# Patient Record
Sex: FEMALE | Race: WHITE | NOT HISPANIC OR LATINO | Employment: OTHER | URBAN - METROPOLITAN AREA
[De-identification: names, ages, dates, MRNs, and addresses within clinical notes are randomized per-mention and may not be internally consistent; named-entity substitution may affect disease eponyms.]

---

## 2017-01-27 ENCOUNTER — GENERIC CONVERSION - ENCOUNTER (OUTPATIENT)
Dept: OTHER | Facility: OTHER | Age: 72
End: 2017-01-27

## 2017-01-30 ENCOUNTER — GENERIC CONVERSION - ENCOUNTER (OUTPATIENT)
Dept: OTHER | Facility: OTHER | Age: 72
End: 2017-01-30

## 2017-03-22 ENCOUNTER — GENERIC CONVERSION - ENCOUNTER (OUTPATIENT)
Dept: OTHER | Facility: OTHER | Age: 72
End: 2017-03-22

## 2017-04-05 ENCOUNTER — GENERIC CONVERSION - ENCOUNTER (OUTPATIENT)
Dept: OTHER | Facility: OTHER | Age: 72
End: 2017-04-05

## 2017-04-11 DIAGNOSIS — Z12.31 ENCOUNTER FOR SCREENING MAMMOGRAM FOR MALIGNANT NEOPLASM OF BREAST: ICD-10-CM

## 2017-05-15 ENCOUNTER — GENERIC CONVERSION - ENCOUNTER (OUTPATIENT)
Dept: OTHER | Facility: OTHER | Age: 72
End: 2017-05-15

## 2018-01-10 NOTE — MISCELLANEOUS
Message  Rec'd call from patient questioning the results of her pathology from in-office procedure completed on 8/1/2016  She states that she was told she would receive a call when pathology was in, and she was getting concerned  I apologized for the delay  (Dr Ephraim Angel verified pathology on 8/11/2016 ) Patient was given normal pathology results and again, I apologized for the delay     Active Problems    1  Anxiety disorder (300 00) (F41 9)   2  Colon cancer screening (V76 51) (Z12 11)   3  Encounter for screening mammogram for malignant neoplasm of breast (V76 12)   (Z12 31)   4  Encounter for special screening examination for genitourinary disorder (V81 6) (Z13 89)   5  Hyperlipidemia (272 4) (E78 5)   6  Left knee pain (719 46) (M25 562)   7  Macular degeneration (362 50) (H35 30)   8  Medicare annual wellness visit, subsequent (V70 0) (Z00 00)   9  Osteoarthritis of left knee, unspecified osteoarthritis type (715 96) (M17 9)   10  Pigmented skin lesion (709 00) (L81 9)   11  Vaginitis, atrophic (627 3) (N95 2)    Current Meds   1  ALPRAZolam 1 MG Oral Tablet; 1/2 po daily prn; Therapy: 08IZV6626 to (Last Rx:32Avb4339) Ordered   2  Premarin 0 625 MG/GM Vaginal Cream; INSERT 0 5 GRAM INTRAVAGINALLY 2 times a   weel; Therapy: 21PYZ4863 to (Evaluate:11Nov2015)  Requested for: 39Ybz2454; Last   Rx:15Apr2015 Ordered   3  ProAir  (90 Base) MCG/ACT Inhalation Aerosol Solution; INHALE 1 TO 2 PUFFS   EVERY 4 TO 6 HOURS AS NEEDED; Therapy: 66PLE9955 to (Evaluate:19Jun2014)  Requested for: 68FQU9426; Last   Rx:59Gje1558 Ordered   4  Sulfamethoxazole-Trimethoprim 400-80 MG Oral Tablet (Bactrim); TAKE 1 TABLET   TWICE DAILY; Therapy: 14Izm0437 to (Evaluate:38Vtu1365)  Requested for: 90Hyy8387; Last   Rx:92Nkh2881 Ordered    Allergies    1   No Known Drug Allergies    Signatures   Electronically signed by : Jeb Looney, ; Aug 23 2016  9:25AM EST                       (Author)

## 2018-01-12 NOTE — PROGRESS NOTES
Assessment    1  Medicare annual wellness visit, subsequent (V70 0) (Z00 00)   2  Colon cancer screening (V76 51) (Z12 11)   3  Pigmented skin lesion (709 00) (L81 9)    Plan  Colon cancer screening    · (1) OCCULT BLOOD, FECAL IMMUNOCHEMICAL TEST; Status:Active; Requested  for:28Mar2016;    · COLONOSCOPY; Status:Active; Requested for:28Mar2016;   Encounter for screening mammogram for malignant neoplasm of breast    · * MAMMO SCREENING BILATERAL W CAD; Status:Hold For - Scheduling; Requested  for:28Mar2016;   Pigmented skin lesion    · 2 - Toshia GRAHAM, Nila Bruno  (Dermatology) Physician Referral  Consult  Status: Hold For -  Scheduling  Requested for: 50AQN7814  Care Summary provided  : Yes    Discussion/Summary  Impression: Subsequent Annual Wellness Visit  Cardiovascular screening and counseling: due for a lipid panel  Diabetes screening and counseling: due for blood glucose  Colorectal cancer screening and counseling: the patient declines screening and due for fecal occult blood testing  Breast cancer screening and counseling: due for a screening mammogram    Cervical cancer screening and counseling: Deferred to GYN  Osteoporosis screening and counseling: counseling was given on obtaining adequate amounts of calcium and vitamin D on a daily basis, counseling was given on the importance of regular weightbearing exercise and due for DXA axial skeleton  Abdominal aortic aneurysm screening and counseling: screening not indicated  Glaucoma screening and counseling: screening is current  HIV screening and counseling: screening not indicated  Immunizations: the patient declines the influenza vaccination, the patient declines the pneumococcal vaccination, the patient declines the hepatitis vaccination series, the patient declines the Zostavax vaccine and the patient declines the Tdap vaccine  Advance Directive Planning: complete and up to date  Patient Discussion: follow-up visit needed in one year  Chief Complaint  ANNUAL PE      History of Present Illness  Welcome to [employer] and Wellness Visits: The patient is being seen for the subsequent annual wellness visit  Medicare Screening and Risk Factors   Hospitalizations: no previous hospitalizations  Medicare Screening Tests Risk Questions   Drug and Alcohol Use: The patient has never smoked cigarettes  The patient reports never drinking alcohol  She has never used illicit drugs  Diet and Physical Activity: Current diet includes well balanced meals, 5 servings of fruit per day, 5 servings of vegetables per day, 2 servings of meat per day, 1 servings of whole grains per day, 1 servings of dairy products per day and 2 cups of coffee per day  Exercise: walking 120 minutes per week  Mood Disorder and Cognitive Impairment Screening: PHQ-9 Depression Scale   Over the past 2 weeks, how often have you been bothered by the following problems? 1 ) Little interest or pleasure in doing things? Not at all    2 ) Feeling down, depressed or hopeless? Not at all    3 ) Trouble falling asleep or sleeping too much? Not at all    4 ) Feeling tired or having little energy? Several days  5 ) Poor appetite or overeating? Not at all    6 ) Feeling bad about yourself, or that you are a failure, or have let yourself or your family down? Not at all    7 ) Trouble concentrating on things, such as reading a newspaper or watching television? Not at all    8 ) Moving or speaking so slowly that other people could have noticed, or the opposite, moving or speaking faster than usual? Not at all  How difficult have these problems made it for you to do your work, take care of things at home, or get along with people? Not at all  She denies feeling down, depressed, or hopeless over the past two weeks  She denies feeling little interest or pleasure in doing things over the past two weeks     Cognitive impairment screening: denies difficulty learning/retaining new information, denies difficulty handling complex tasks, denies difficulty with reasoning, denies difficulty with spatial ability and orientation, denies difficulty with language and denies difficulty with behavior  Functional Ability/Level of Safety: She denies hearing difficulties  Activities of daily living details: does not need help using the phone, no transportation help needed, does not need help shopping, no meal preparation help needed, does not need help doing housework, does not need help doing laundry, does not need help managing medications and does not need help managing money  Fall risk factors: The patient fell 0 times in the past 12 months  Home safety risk factors:  no grab bars in the bathroom and no handrails on the stairs, but no unfamiliar surroundings, no loose rugs, no poor household lighting, no uneven floors and no household clutter  Advance Directives: Advance directives: durable power of  for health care directives, but no living will and no advance directives  Co-Managers and Medical Equipment/Suppliers: See Patient Care Team      Patient Care Team    Care Team Member Role Specialty Office Number   Serenity GEE  Obstetrics/Gynecology [phone]     Review of Systems    Constitutional: no fever, no chills, no malaise, no fatigue and no anorexia  Head and Face: no facial pressure  Eyes: blurred vision and Macular degeneration right worse than the left  ENT: no earache, no nasal congestion, no nasal discharge and no hoarseness  Cardiovascular: no chest pain, no palpitations and no lower extremity edema  Respiratory: no shortness of breath, no cough and no colored sputum  Gastrointestinal: no abdominal pain, no abdominal bloating, no nausea, no vomiting, no diarrhea, no constipation, no bright red blood per rectum and no melena  Genitourinary: no dysuria, no urinary frequency, no urinary urgency, no hematuria and no foul-smelling vaginal discharge     Musculoskeletal: Left knee no pain, but no diffuse joint pain, no generalized muscle aches, no joint swelling and no joint stiffness  Integumentary and Breasts: Age-related spots /nevi, but no rashes, no breast pain and no breast lump    no skin lesions  Neurological: no paresthesias, no leg weakness and no difficulty walking  Psychiatric: no anxiety and no depression  Endocrine: no night sweats and no muscle weakness  Hematologic and Lymphatic: no tendency for easy bleeding and no tendency for easy bruising  Active Problems    1  Anxiety disorder (300 00) (F41 9)   2  Encounter for screening mammogram for malignant neoplasm of breast (V76 12)   (Z12 31)   3  Hyperlipidemia (272 4) (E78 5)   4  Impaired fasting glucose (790 21) (R73 01)   5  Left knee pain (719 46) (M25 562)   6  Macular degeneration (362 50) (H35 30)   7  Osteoarthritis of left knee, unspecified osteoarthritis type (715 96) (M17 9)   8  Vaginitis, atrophic (627 3) (N95 2)    Past Medical History    · History of Asthma (493 90) (J45 909)   · History of Depression (311) (F32 9)   · History of Exposure to chickenpox (V01 71) (Z20 820)   · History of bronchitis (V12 69) (Z87 09)   · History of glaucoma (V12 49) (Z86 69)   · History of hyperlipidemia (V12 29) (Z86 39)   · History of Mumps (072 9)   · History of Osteoporosis (733 00) (M81 0)   · History of Simple goiter (240 0) (E04 0)    The active problems and past medical history were reviewed and updated today  Surgical History    · History of Breast Surgery   · History of  Section   · History of Hemorrhoidectomy    The surgical history was reviewed and updated today  Family History    · Family history of Lung cancer    · Adopted (V68 89) (Z02 82)    The family history was reviewed and updated today  Social History    · Daily Coffee Consumption (2  Cups/Day)   · Never A Smoker   · Remote social alcohol use  The social history was reviewed and updated today  Current Meds   1  ALPRAZolam 1 MG Oral Tablet; 1/2 po daily prn; Therapy: 93DPC9709 to (Last Rx:03Mar2015) Ordered   2  Premarin 0 625 MG/GM Vaginal Cream; INSERT 0 5 GRAM INTRAVAGINALLY 2 times a   weel; Therapy: 22REP6503 to (Evaluate:11Nov2015)  Requested for: 15Apr2015; Last   Rx:15Apr2015 Ordered   3  ProAir  (90 Base) MCG/ACT Inhalation Aerosol Solution; INHALE 1 TO 2 PUFFS   EVERY 4 TO 6 HOURS AS NEEDED; Therapy: 75DMF9072 to (Evaluate:19Jun2014)  Requested for: 04MJY4648; Last   Rx:27Kso4489 Ordered    The medication list was reviewed and updated today  Allergies    1  No Known Drug Allergies    Vitals  Signs [Data Includes: Current Encounter]    Heart Rate: 69  Systolic: 752  Diastolic: 68  Height: 5 ft 4 in  Weight: 155 lb 8 0 oz  BMI Calculated: 26 69  BSA Calculated: 1 77  O2 Saturation: 95    Physical Exam    Constitutional   General appearance: No acute distress, well appearing and well nourished  Head and Face   Head and face: Normal     Eyes   Pupils and irises: Equal, round, reactive to light  Ears, Nose, Mouth, and Throat   Otoscopic examination: Tympanic membranes translucent with normal light reflex  Canals patent without erythema  Lips, teeth, and gums: Normal, good dentition  Oropharynx: Normal with no erythema, edema, exudate or lesions  Neck   Neck: Supple, symmetric, trachea midline, no masses  Thyroid: Normal, no thyromegaly  Pulmonary   Respiratory effort: No increased work of breathing or signs of respiratory distress  Auscultation of lungs: Clear to auscultation  Cardiovascular   Auscultation of heart: Normal rate and rhythm, normal S1 and S2, no murmurs  Carotid pulses: 2+ bilaterally  Pedal pulses: 2+ bilaterally  Examination of extremities for edema and/or varicosities: Normal     Chest GYN deferral    Abdomen   Abdomen: Non-tender, no masses  Liver and spleen: No hepatomegaly or splenomegaly  Fit test ordered  Genitourinary GYN deferred  Lymphatic   Palpation of lymph nodes in neck: No lymphadenopathy  Palpation of lymph nodes in axillae: No lymphadenopathy  Musculoskeletal   Gait and station: Normal     Digits and nails: Normal without clubbing or cyanosis  Joints, bones, and muscles: Normal     Range of motion: Normal     Skin   Skin and subcutaneous tissue: Abnormal   Multiple nevi very dark  Neurologic   Cortical function: Normal mental status  Reflexes: 2+ and symmetric  Sensation: No sensory loss  Coordination: Normal finger to nose and heel to shin  Psychiatric   Judgment and insight: Normal     Orientation to person, place, and time: Normal     Mood and affect: Normal        Health Management  Health Maintenance   Medicare Annual Wellness Visit; every 1 year; Next Due: 82NBH7435;  Overdue    Signatures   Electronically signed by : CHANG Andrew ; Mar 29 2016 12:09PM EST                       (Author)

## 2018-01-29 ENCOUNTER — APPOINTMENT (EMERGENCY)
Dept: RADIOLOGY | Facility: HOSPITAL | Age: 73
DRG: 246 | End: 2018-01-29
Payer: MEDICARE

## 2018-01-29 ENCOUNTER — HOSPITAL ENCOUNTER (INPATIENT)
Facility: HOSPITAL | Age: 73
LOS: 2 days | Discharge: HOME | DRG: 246 | End: 2018-01-31
Attending: EMERGENCY MEDICINE | Admitting: INTERNAL MEDICINE
Payer: MEDICARE

## 2018-01-29 ENCOUNTER — APPOINTMENT (EMERGENCY)
Dept: NON INVASIVE DIAGNOSTICS | Facility: HOSPITAL | Age: 73
DRG: 246 | End: 2018-01-29
Attending: INTERNAL MEDICINE
Payer: MEDICARE

## 2018-01-29 DIAGNOSIS — I21.3 STEMI (ST ELEVATION MYOCARDIAL INFARCTION) (HCC): Primary | ICD-10-CM

## 2018-01-29 LAB
ALBUMIN SERPL BCP-MCNC: 3.9 G/DL (ref 3.5–5)
ALP SERPL-CCNC: 72 U/L (ref 46–116)
ALT SERPL W P-5'-P-CCNC: 29 U/L (ref 12–78)
ANION GAP SERPL CALCULATED.3IONS-SCNC: 3 MMOL/L (ref 4–13)
APTT PPP: 27 SECONDS (ref 23–35)
AST SERPL W P-5'-P-CCNC: 24 U/L (ref 5–45)
BASOPHILS # BLD AUTO: 0.05 THOUSANDS/ΜL (ref 0–0.1)
BASOPHILS NFR BLD AUTO: 1 % (ref 0–1)
BILIRUB SERPL-MCNC: 0.27 MG/DL (ref 0.2–1)
BUN SERPL-MCNC: 15 MG/DL (ref 5–25)
CALCIUM SERPL-MCNC: 9.9 MG/DL (ref 8.3–10.1)
CHLORIDE SERPL-SCNC: 105 MMOL/L (ref 100–108)
CO2 SERPL-SCNC: 34 MMOL/L (ref 21–32)
CREAT SERPL-MCNC: 1.01 MG/DL (ref 0.6–1.3)
EOSINOPHIL # BLD AUTO: 0.17 THOUSAND/ΜL (ref 0–0.61)
EOSINOPHIL NFR BLD AUTO: 2 % (ref 0–6)
ERYTHROCYTE [DISTWIDTH] IN BLOOD BY AUTOMATED COUNT: 14.8 % (ref 11.6–15.1)
GFR SERPL CREATININE-BSD FRML MDRD: 55 ML/MIN/1.73SQ M
GLUCOSE SERPL-MCNC: 127 MG/DL (ref 65–140)
HCT VFR BLD AUTO: 45.6 % (ref 34.8–46.1)
HGB BLD-MCNC: 15.4 G/DL (ref 11.5–15.4)
INR PPP: 0.87 (ref 0.86–1.16)
KCT BLD-ACNC: 236 SEC (ref 89–137)
KCT BLD-ACNC: 272 SEC (ref 89–137)
LYMPHOCYTES # BLD AUTO: 3.25 THOUSANDS/ΜL (ref 0.6–4.47)
LYMPHOCYTES NFR BLD AUTO: 39 % (ref 14–44)
MCH RBC QN AUTO: 30.1 PG (ref 26.8–34.3)
MCHC RBC AUTO-ENTMCNC: 33.8 G/DL (ref 31.4–37.4)
MCV RBC AUTO: 89 FL (ref 82–98)
MONOCYTES # BLD AUTO: 0.76 THOUSAND/ΜL (ref 0.17–1.22)
MONOCYTES NFR BLD AUTO: 9 % (ref 4–12)
NEUTROPHILS # BLD AUTO: 4.07 THOUSANDS/ΜL (ref 1.85–7.62)
NEUTS SEG NFR BLD AUTO: 49 % (ref 43–75)
NRBC BLD AUTO-RTO: 0 /100 WBCS
PLATELET # BLD AUTO: 285 THOUSANDS/UL (ref 149–390)
PMV BLD AUTO: 9.8 FL (ref 8.9–12.7)
POTASSIUM SERPL-SCNC: 3.8 MMOL/L (ref 3.5–5.3)
PROT SERPL-MCNC: 7.2 G/DL (ref 6.4–8.2)
PROTHROMBIN TIME: 11.8 SECONDS (ref 12.1–14.4)
RBC # BLD AUTO: 5.12 MILLION/UL (ref 3.81–5.12)
SODIUM SERPL-SCNC: 142 MMOL/L (ref 136–145)
SPECIMEN SOURCE: ABNORMAL
SPECIMEN SOURCE: ABNORMAL
TROPONIN I SERPL-MCNC: 4.78 NG/ML
TROPONIN I SERPL-MCNC: >40 NG/ML
WBC # BLD AUTO: 8.3 THOUSAND/UL (ref 4.31–10.16)

## 2018-01-29 PROCEDURE — 92941 PRQ TRLML REVSC TOT OCCL AMI: CPT | Performed by: INTERNAL MEDICINE

## 2018-01-29 PROCEDURE — 92979 ENDOLUMINL IVUS OCT C EA: CPT | Performed by: INTERNAL MEDICINE

## 2018-01-29 PROCEDURE — C1874 STENT, COATED/COV W/DEL SYS: HCPCS

## 2018-01-29 PROCEDURE — 027136Z DILATION OF CORONARY ARTERY, TWO ARTERIES WITH THREE DRUG-ELUTING INTRALUMINAL DEVICES, PERCUTANEOUS APPROACH: ICD-10-PCS | Performed by: INTERNAL MEDICINE

## 2018-01-29 PROCEDURE — 36415 COLL VENOUS BLD VENIPUNCTURE: CPT

## 2018-01-29 PROCEDURE — C1769 GUIDE WIRE: HCPCS | Performed by: INTERNAL MEDICINE

## 2018-01-29 PROCEDURE — C1894 INTRO/SHEATH, NON-LASER: HCPCS | Performed by: INTERNAL MEDICINE

## 2018-01-29 PROCEDURE — 96376 TX/PRO/DX INJ SAME DRUG ADON: CPT

## 2018-01-29 PROCEDURE — C1725 CATH, TRANSLUMIN NON-LASER: HCPCS | Performed by: INTERNAL MEDICINE

## 2018-01-29 PROCEDURE — 84484 ASSAY OF TROPONIN QUANT: CPT

## 2018-01-29 PROCEDURE — 84484 ASSAY OF TROPONIN QUANT: CPT | Performed by: INTERNAL MEDICINE

## 2018-01-29 PROCEDURE — 85347 COAGULATION TIME ACTIVATED: CPT

## 2018-01-29 PROCEDURE — 85610 PROTHROMBIN TIME: CPT | Performed by: EMERGENCY MEDICINE

## 2018-01-29 PROCEDURE — 99152 MOD SED SAME PHYS/QHP 5/>YRS: CPT | Performed by: INTERNAL MEDICINE

## 2018-01-29 PROCEDURE — 96365 THER/PROPH/DIAG IV INF INIT: CPT

## 2018-01-29 PROCEDURE — 85730 THROMBOPLASTIN TIME PARTIAL: CPT | Performed by: EMERGENCY MEDICINE

## 2018-01-29 PROCEDURE — B241ZZ3 ULTRASONOGRAPHY OF MULTIPLE CORONARY ARTERIES, INTRAVASCULAR: ICD-10-PCS | Performed by: INTERNAL MEDICINE

## 2018-01-29 PROCEDURE — 93454 CORONARY ARTERY ANGIO S&I: CPT | Performed by: INTERNAL MEDICINE

## 2018-01-29 PROCEDURE — C1887 CATHETER, GUIDING: HCPCS | Performed by: INTERNAL MEDICINE

## 2018-01-29 PROCEDURE — 92978 ENDOLUMINL IVUS OCT C 1ST: CPT | Performed by: INTERNAL MEDICINE

## 2018-01-29 PROCEDURE — 92928 PRQ TCAT PLMT NTRAC ST 1 LES: CPT | Performed by: INTERNAL MEDICINE

## 2018-01-29 PROCEDURE — B2111ZZ FLUOROSCOPY OF MULTIPLE CORONARY ARTERIES USING LOW OSMOLAR CONTRAST: ICD-10-PCS | Performed by: INTERNAL MEDICINE

## 2018-01-29 PROCEDURE — 96361 HYDRATE IV INFUSION ADD-ON: CPT

## 2018-01-29 PROCEDURE — 96375 TX/PRO/DX INJ NEW DRUG ADDON: CPT

## 2018-01-29 PROCEDURE — C9606 PERC D-E COR REVASC W AMI S: HCPCS | Performed by: INTERNAL MEDICINE

## 2018-01-29 PROCEDURE — 80053 COMPREHEN METABOLIC PANEL: CPT

## 2018-01-29 PROCEDURE — 99291 CRITICAL CARE FIRST HOUR: CPT

## 2018-01-29 PROCEDURE — C1753 CATH, INTRAVAS ULTRASOUND: HCPCS | Performed by: INTERNAL MEDICINE

## 2018-01-29 PROCEDURE — 85025 COMPLETE CBC W/AUTO DIFF WBC: CPT

## 2018-01-29 PROCEDURE — 93005 ELECTROCARDIOGRAM TRACING: CPT

## 2018-01-29 PROCEDURE — C9607 PERC D-E COR REVASC CHRO SIN: HCPCS | Performed by: INTERNAL MEDICINE

## 2018-01-29 PROCEDURE — 71045 X-RAY EXAM CHEST 1 VIEW: CPT

## 2018-01-29 PROCEDURE — 99153 MOD SED SAME PHYS/QHP EA: CPT | Performed by: INTERNAL MEDICINE

## 2018-01-29 RX ORDER — NITROGLYCERIN 20 MG/100ML
INJECTION INTRAVENOUS
Status: COMPLETED | OUTPATIENT
Start: 2018-01-29 | End: 2018-01-29

## 2018-01-29 RX ORDER — SODIUM CHLORIDE 9 MG/ML
INJECTION, SOLUTION INTRAVENOUS
Status: DISCONTINUED | OUTPATIENT
Start: 2018-01-29 | End: 2018-01-29

## 2018-01-29 RX ORDER — HEPARIN SODIUM 1000 [USP'U]/ML
3900 INJECTION, SOLUTION INTRAVENOUS; SUBCUTANEOUS AS NEEDED
Status: DISCONTINUED | OUTPATIENT
Start: 2018-01-29 | End: 2018-01-29

## 2018-01-29 RX ORDER — NITROGLYCERIN 20 MG/100ML
5-200 INJECTION INTRAVENOUS
Status: DISCONTINUED | OUTPATIENT
Start: 2018-01-29 | End: 2018-01-29

## 2018-01-29 RX ORDER — LIDOCAINE HYDROCHLORIDE 10 MG/ML
INJECTION, SOLUTION INFILTRATION; PERINEURAL CODE/TRAUMA/SEDATION MEDICATION
Status: COMPLETED | OUTPATIENT
Start: 2018-01-29 | End: 2018-01-29

## 2018-01-29 RX ORDER — SODIUM CHLORIDE 9 MG/ML
125 INJECTION, SOLUTION INTRAVENOUS CONTINUOUS
Status: DISCONTINUED | OUTPATIENT
Start: 2018-01-29 | End: 2018-01-30

## 2018-01-29 RX ORDER — FENTANYL CITRATE 50 UG/ML
INJECTION, SOLUTION INTRAMUSCULAR; INTRAVENOUS CODE/TRAUMA/SEDATION MEDICATION
Status: COMPLETED | OUTPATIENT
Start: 2018-01-29 | End: 2018-01-29

## 2018-01-29 RX ORDER — ASPIRIN 81 MG/1
81 TABLET, CHEWABLE ORAL DAILY
Status: DISCONTINUED | OUTPATIENT
Start: 2018-01-30 | End: 2018-01-31

## 2018-01-29 RX ORDER — MIDAZOLAM HYDROCHLORIDE 1 MG/ML
INJECTION INTRAMUSCULAR; INTRAVENOUS CODE/TRAUMA/SEDATION MEDICATION
Status: COMPLETED | OUTPATIENT
Start: 2018-01-29 | End: 2018-01-29

## 2018-01-29 RX ORDER — HEPARIN SODIUM 1000 [USP'U]/ML
INJECTION, SOLUTION INTRAVENOUS; SUBCUTANEOUS CODE/TRAUMA/SEDATION MEDICATION
Status: COMPLETED | OUTPATIENT
Start: 2018-01-29 | End: 2018-01-29

## 2018-01-29 RX ORDER — VERAPAMIL HCL 2.5 MG/ML
AMPUL (ML) INTRAVENOUS CODE/TRAUMA/SEDATION MEDICATION
Status: COMPLETED | OUTPATIENT
Start: 2018-01-29 | End: 2018-01-29

## 2018-01-29 RX ORDER — HEPARIN SODIUM 1000 [USP'U]/ML
1950 INJECTION, SOLUTION INTRAVENOUS; SUBCUTANEOUS AS NEEDED
Status: DISCONTINUED | OUTPATIENT
Start: 2018-01-29 | End: 2018-01-29

## 2018-01-29 RX ORDER — NITROGLYCERIN 20 MG/100ML
INJECTION INTRAVENOUS CODE/TRAUMA/SEDATION MEDICATION
Status: COMPLETED | OUTPATIENT
Start: 2018-01-29 | End: 2018-01-29

## 2018-01-29 RX ORDER — HEPARIN SODIUM 1000 [USP'U]/ML
3900 INJECTION, SOLUTION INTRAVENOUS; SUBCUTANEOUS ONCE
Status: COMPLETED | OUTPATIENT
Start: 2018-01-29 | End: 2018-01-29

## 2018-01-29 RX ORDER — ASPIRIN 325 MG
325 TABLET ORAL ONCE
Status: COMPLETED | OUTPATIENT
Start: 2018-01-29 | End: 2018-01-29

## 2018-01-29 RX ORDER — ISOSORBIDE MONONITRATE 30 MG/1
30 TABLET, EXTENDED RELEASE ORAL DAILY
Status: DISCONTINUED | OUTPATIENT
Start: 2018-01-29 | End: 2018-01-30

## 2018-01-29 RX ORDER — HEPARIN SODIUM 10000 [USP'U]/100ML
3-20 INJECTION, SOLUTION INTRAVENOUS
Status: DISCONTINUED | OUTPATIENT
Start: 2018-01-29 | End: 2018-01-29

## 2018-01-29 RX ORDER — ATORVASTATIN CALCIUM 40 MG/1
80 TABLET, FILM COATED ORAL EVERY EVENING
Status: DISCONTINUED | OUTPATIENT
Start: 2018-01-29 | End: 2018-01-31

## 2018-01-29 RX ADMIN — FENTANYL CITRATE 25 MCG: 50 INJECTION, SOLUTION INTRAMUSCULAR; INTRAVENOUS at 18:32

## 2018-01-29 RX ADMIN — HEPARIN SODIUM 4000 UNITS: 1000 INJECTION INTRAVENOUS; SUBCUTANEOUS at 18:25

## 2018-01-29 RX ADMIN — NITROGLYCERIN 200 MCG: 20 INJECTION INTRAVENOUS at 18:25

## 2018-01-29 RX ADMIN — HEPARIN SODIUM 5000 UNITS: 1000 INJECTION INTRAVENOUS; SUBCUTANEOUS at 19:20

## 2018-01-29 RX ADMIN — ATORVASTATIN CALCIUM 80 MG: 40 TABLET, FILM COATED ORAL at 21:45

## 2018-01-29 RX ADMIN — METOPROLOL TARTRATE 25 MG: 25 TABLET ORAL at 21:45

## 2018-01-29 RX ADMIN — HEPARIN SODIUM AND DEXTROSE 12 UNITS/KG/HR: 10000; 5 INJECTION INTRAVENOUS at 17:50

## 2018-01-29 RX ADMIN — HEPARIN SODIUM 5000 UNITS: 1000 INJECTION INTRAVENOUS; SUBCUTANEOUS at 18:50

## 2018-01-29 RX ADMIN — LIDOCAINE HYDROCHLORIDE 1 ML: 10 INJECTION, SOLUTION INFILTRATION; PERINEURAL at 18:24

## 2018-01-29 RX ADMIN — MIDAZOLAM 1 MG: 1 INJECTION INTRAMUSCULAR; INTRAVENOUS at 18:32

## 2018-01-29 RX ADMIN — HEPARIN SODIUM 3900 UNITS: 1000 INJECTION, SOLUTION INTRAVENOUS; SUBCUTANEOUS at 17:45

## 2018-01-29 RX ADMIN — IOHEXOL 190 ML: 350 INJECTION, SOLUTION INTRAVENOUS at 19:59

## 2018-01-29 RX ADMIN — ASPIRIN 325 MG: 325 TABLET ORAL at 17:43

## 2018-01-29 RX ADMIN — SODIUM CHLORIDE 125 ML/HR: 0.9 INJECTION, SOLUTION INTRAVENOUS at 21:52

## 2018-01-29 RX ADMIN — MIDAZOLAM 1 MG: 1 INJECTION INTRAMUSCULAR; INTRAVENOUS at 19:14

## 2018-01-29 RX ADMIN — NITROGLYCERIN 10 MCG: 20 INJECTION INTRAVENOUS at 18:24

## 2018-01-29 RX ADMIN — Medication 1.25 MG: at 18:25

## 2018-01-29 RX ADMIN — MIDAZOLAM 2 MG: 1 INJECTION INTRAMUSCULAR; INTRAVENOUS at 18:23

## 2018-01-29 RX ADMIN — FENTANYL CITRATE 50 MCG: 50 INJECTION, SOLUTION INTRAMUSCULAR; INTRAVENOUS at 18:23

## 2018-01-29 RX ADMIN — NITROGLYCERIN 200 MCG: 20 INJECTION INTRAVENOUS at 19:30

## 2018-01-29 RX ADMIN — FENTANYL CITRATE 25 MCG: 50 INJECTION, SOLUTION INTRAMUSCULAR; INTRAVENOUS at 19:14

## 2018-01-29 RX ADMIN — TICAGRELOR 180 MG: 90 TABLET ORAL at 17:57

## 2018-01-29 RX ADMIN — ISOSORBIDE MONONITRATE 30 MG: 30 TABLET, EXTENDED RELEASE ORAL at 21:44

## 2018-01-29 RX ADMIN — SODIUM CHLORIDE 100 ML/HR: 0.9 INJECTION, SOLUTION INTRAVENOUS at 18:18

## 2018-01-29 NOTE — ED PROVIDER NOTES
History  Chief Complaint   Patient presents with    Chest Pain     chest pain started 10 minutes pta, constant  jaw pain  R arm pain  nausea  73 YO female presents with a crushing anterior chest pressure starting 10 minutes prior to arrival  Pt states this has been constant since onset, radiating to the Right arm and into the B/L neck, associated with nausea and shortness of breath, no diaphoresis  Pt states she has been getting similar discomfort intermittent over the last 2 months, notes she can recreate this pain by walking on her treadmill  Pt states today's pain started suddenly with no inciting events  Pt was to have a stress test as an outpatient tomorrow  Pt denies F/C/V/D/C, no dysuria, burning on urination or blood in urine  History provided by:  Patient   used: No    Chest Pain   Pain location:  Substernal area  Pain quality: crushing and pressure    Pain radiates to:  R arm, L jaw and R jaw  Pain radiates to the back: no    Pain severity:  Moderate  Onset quality:  Sudden  Duration:  10 minutes  Timing:  Constant  Progression:  Unchanged  Chronicity:  New  Relieved by:  Nothing  Worsened by:  Nothing tried  Ineffective treatments:  None tried  Associated symptoms: nausea    Associated symptoms: no abdominal pain, no cough, no diaphoresis, no dizziness, no fatigue, no fever, no headache, no palpitations, no shortness of breath, not vomiting and no weakness    Nausea:     Severity:  Mild    Onset quality:  Sudden    Timing:  Constant    Progression:  Waxing and waning      Prior to Admission Medications   Prescriptions Last Dose Informant Patient Reported?  Taking?   bimatoprost (LUMIGAN) 0 01 % ophthalmic drops   Yes Yes   Sig: Administer 1 drop to both eyes daily at bedtime      Facility-Administered Medications: None       Past Medical History:   Diagnosis Date    Glaucoma        Past Surgical History:   Procedure Laterality Date     SECTION         History reviewed  No pertinent family history  I have reviewed and agree with the history as documented  Social History   Substance Use Topics    Smoking status: Former Smoker    Smokeless tobacco: Never Used    Alcohol use No        Review of Systems   Constitutional: Negative for chills, diaphoresis, fatigue and fever  HENT: Negative for dental problem  Eyes: Negative for visual disturbance  Respiratory: Negative for cough and shortness of breath  Cardiovascular: Positive for chest pain  Negative for palpitations  Gastrointestinal: Positive for nausea  Negative for abdominal pain, diarrhea and vomiting  Genitourinary: Negative for dysuria and frequency  Musculoskeletal: Negative for arthralgias  Skin: Negative for rash  Neurological: Negative for dizziness, weakness, light-headedness and headaches  Psychiatric/Behavioral: Negative for agitation, behavioral problems and confusion  All other systems reviewed and are negative  Physical Exam  ED Triage Vitals   Temperature Pulse Respirations Blood Pressure SpO2   01/29/18 1729 01/29/18 1729 01/29/18 1729 01/29/18 1739 01/29/18 1729   (!) 97 3 °F (36 3 °C) 61 20 (!) 202/95 100 %      Temp Source Heart Rate Source Patient Position - Orthostatic VS BP Location FiO2 (%)   01/29/18 2124 -- 01/29/18 1739 01/29/18 1739 --   Temporal  Lying Right arm       Pain Score       01/29/18 1729       7           Orthostatic Vital Signs  Vitals:    01/29/18 1729 01/29/18 1739 01/29/18 1800 01/29/18 1805   BP:  (!) 202/95 (!) 187/88 (!) 203/86   Pulse: 61  62 64   Patient Position - Orthostatic VS:  Lying Lying Lying       Physical Exam   Constitutional: She is oriented to person, place, and time  She appears well-developed and well-nourished  HENT:   Head: Normocephalic and atraumatic  Eyes: EOM are normal    Neck: Normal range of motion  Cardiovascular: Normal rate, regular rhythm and normal heart sounds      Pulmonary/Chest: Effort normal and breath sounds normal    Abdominal: Soft  Musculoskeletal: Normal range of motion  Neurological: She is alert and oriented to person, place, and time  Skin: Skin is warm and dry  Psychiatric: She has a normal mood and affect  Her behavior is normal  Thought content normal    Nursing note and vitals reviewed        ED Medications  Medications   sodium chloride 0 9 % infusion (not administered)   atorvastatin (LIPITOR) tablet 80 mg (not administered)   aspirin chewable tablet 81 mg (not administered)   ticagrelor (BRILINTA) tablet 90 mg (not administered)   metoprolol tartrate (LOPRESSOR) tablet 25 mg (not administered)   isosorbide mononitrate (IMDUR) 24 hr tablet 30 mg (not administered)   aspirin tablet 325 mg (325 mg Oral Given 1/29/18 1743)   heparin (porcine) injection 3,900 Units (3,900 Units Intravenous Given 1/29/18 1745)   fentanyl citrate (PF) 100 MCG/2ML (25 mcg Intravenous Given 1/29/18 1914)   midazolam (VERSED) injection (1 mg Intravenous Given 1/29/18 1914)   lidocaine (XYLOCAINE) 1 % injection (1 mL Infiltration Given 1/29/18 1824)   nitroGLYcerin (TRIDIL) 50 mg in 250 mL infusion (premix) (  Stopped 1/29/18 1958)   nitroGLYcerin (TRIDIL) 50 mg in 250 mL infusion (premix) (200 mcg Intra-arterial Given 1/29/18 1825)   verapamil (ISOPTIN) injection (1 25 mg Intra-arterial Given 1/29/18 1825)   heparin (porcine) injection (5,000 Units Intravenous Given 1/29/18 1920)   nitroGLYcerin (TRIDIL) 50 mg in 250 mL infusion (premix) (200 mcg Other Given 1/29/18 1930)   iohexol (OMNIPAQUE) 350 MG/ML injection (SINGLE-DOSE) (190 mL Intravenous Given 1/29/18 1959)       Diagnostic Studies  Results Reviewed     Procedure Component Value Units Date/Time    POCT activated clotting time [[id number]]  (Abnormal) Collected:  01/29/18 1846    Lab Status:  Final result Specimen:  Blood Updated:  01/29/18 2005     Activated Clotting Time, i-STAT 236 (H) sec      Specimen Type VENOUS    POCT activated clotting time [[id number]] (Abnormal) Collected:  01/29/18 1916    Lab Status:  Final result Specimen:  Blood Updated:  01/29/18 2005     Activated Clotting Time, i-STAT 272 (H) sec      Specimen Type VENOUS    APTT six (6) hours after Heparin bolus/drip initiation or dosing change [[id number]]  (Normal) Collected:  01/29/18 1742    Lab Status:  Final result Specimen:  Blood from Arm, Left Updated:  01/29/18 1809     PTT 27 seconds     Narrative: Therapeutic Heparin Range = 60-90 seconds    Protime-INR [[id number]]  (Abnormal) Collected:  01/29/18 1742    Lab Status:  Final result Specimen:  Blood from Arm, Left Updated:  01/29/18 1809     Protime 11 8 (L) seconds      INR 0 87    Comprehensive metabolic panel [[id number]]  (Abnormal) Collected:  01/29/18 1738    Lab Status:  Final result Specimen:  Blood from Arm, Left Updated:  01/29/18 1801     Sodium 142 mmol/L      Potassium 3 8 mmol/L      Chloride 105 mmol/L      CO2 34 (H) mmol/L      Anion Gap 3 (L) mmol/L      BUN 15 mg/dL      Creatinine 1 01 mg/dL      Glucose 127 mg/dL      Calcium 9 9 mg/dL      AST 24 U/L      ALT 29 U/L      Alkaline Phosphatase 72 U/L      Total Protein 7 2 g/dL      Albumin 3 9 g/dL      Total Bilirubin 0 27 mg/dL      eGFR 55 ml/min/1 73sq m     Narrative:         National Kidney Disease Education Program recommendations are as follows:  GFR calculation is accurate only with a steady state creatinine  Chronic Kidney disease less than 60 ml/min/1 73 sq  meters  Kidney failure less than 15 ml/min/1 73 sq  meters      Troponin I [[id number]]  (Abnormal) Collected:  01/29/18 1738    Lab Status:  Final result Specimen:  Blood from Arm, Left Updated:  01/29/18 1801     Troponin I 4 78 (H) ng/mL     Narrative:         [employer] Chemistry analyzer 99% cutoff is > 0 04 ng/mL in network labs    o cTnI 99% cutoff is useful only when applied to patients in the clinical setting of myocardial ischemia  o cTnI 99% cutoff should be interpreted in the context of clinical history, ECG findings and possibly cardiac imaging to establish correct diagnosis  o cTnI 99% cutoff may be suggestive but clearly not indicative of a coronary event without the clinical setting of myocardial ischemia  CBC and differential [[id number]]  (Normal) Collected:  01/29/18 1738    Lab Status:  Final result Specimen:  Blood from Arm, Left Updated:  01/29/18 1744     WBC 8 30 Thousand/uL      RBC 5 12 Million/uL      Hemoglobin 15 4 g/dL      Hematocrit 45 6 %      MCV 89 fL      MCH 30 1 pg      MCHC 33 8 g/dL      RDW 14 8 %      MPV 9 8 fL      Platelets 593 Thousands/uL      nRBC 0 /100 WBCs      Neutrophils Relative 49 %      Lymphocytes Relative 39 %      Monocytes Relative 9 %      Eosinophils Relative 2 %      Basophils Relative 1 %      Neutrophils Absolute 4 07 Thousands/µL      Lymphocytes Absolute 3 25 Thousands/µL      Monocytes Absolute 0 76 Thousand/µL      Eosinophils Absolute 0 17 Thousand/µL      Basophils Absolute 0 05 Thousands/µL                  XR chest portable   Final Result by Taisha Hernández MD (01/29 1933)      No active pulmonary disease            Workstation performed: [id number]                    Procedures  ECG 12 Lead Documentation  Date/Time: 1/29/2018 6:00 PM  Performed by: [employer] by: Alfred PHAM     ECG reviewed by me, the ED Provider: yes    Patient location:  ED  Previous ECG:     Previous ECG:  Unavailable  Interpretation:     Interpretation: abnormal    Rate:     ECG rate assessment: normal    Rhythm:     Rhythm: sinus rhythm    QRS:     QRS axis:  Normal    QRS intervals:  Normal  Conduction:     Conduction: normal    ST segments:     ST segments:  Abnormal    Elevation:  II, III, aVR and V3    Depression:  AVL and I  T waves:     T waves: normal    Comments:      No previous ECG, Care Everywhere has a reading of "Normal ECG" from 2017           Phone Contacts  ED Phone Contact    ED Course  ED Course as of Jan 29 2143 Mon Jan 29, 2018 Paradise Combs 56 with cardiology to explain pt's symptoms and concerning ECG  MDM  Number of Diagnoses or Management Options  STEMI (ST elevation myocardial infarction) [facility]): new and requires workup  Diagnosis management comments: 1  Chest pain - ECG is concerning for STEMI, will alert  Give ASA, Brilinta  Amount and/or Complexity of Data Reviewed  Clinical lab tests: ordered and reviewed  Tests in the radiology section of CPT®: ordered and reviewed  Discuss the patient with other providers: yes  Independent visualization of images, tracings, or specimens: yes    Patient Progress  Patient progress: stable    CritCare Time    Disposition  Final diagnoses:   STEMI (ST elevation myocardial infarction) ([facility] [address] )     Time reflects when diagnosis was documented in both MDM as applicable and the Disposition within this note     Time User Action Codes Description Comment    1/29/2018  6:13 PM Aziza PHAM Add [I21 3] STEMI (ST elevation myocardial infarction) [facility])       ED Disposition     ED Disposition Condition Comment    Admit  Case was discussed with Dr Arabella Barrera and the patient's admission status was agreed to be Admission Status: inpatient status to the service of Dr Arabella Barrera   Follow-up Information    None       Current Discharge Medication List      CONTINUE these medications which have NOT CHANGED    Details   bimatoprost (LUMIGAN) 0 01 % ophthalmic drops Administer 1 drop to both eyes daily at bedtime           No discharge procedures on file      ED Provider  Electronically Signed by           Scott Elena MD  01/29/18 8112

## 2018-01-29 NOTE — ED NOTES
Pt relates started several months ago with cp/sob with exercise, sat night had episode of cp at rest  Tonight approx 30min pta started with pain across upper chest, pressure, with pain into right arm and jaw and slight sob        Betty Florentin RN  01/29/18 6511

## 2018-01-30 ENCOUNTER — APPOINTMENT (INPATIENT)
Dept: NON INVASIVE DIAGNOSTICS | Facility: HOSPITAL | Age: 73
DRG: 246 | End: 2018-01-30
Attending: INTERNAL MEDICINE
Payer: MEDICARE

## 2018-01-30 ENCOUNTER — APPOINTMENT (INPATIENT)
Dept: NON INVASIVE DIAGNOSTICS | Facility: HOSPITAL | Age: 73
DRG: 246 | End: 2018-01-30
Payer: MEDICARE

## 2018-01-30 PROBLEM — I21.19 ACUTE ST ELEVATION MYOCARDIAL INFARCTION (STEMI) OF INFERIOR WALL (HCC): Status: ACTIVE | Noted: 2018-01-30

## 2018-01-30 PROBLEM — E78.5 DYSLIPIDEMIA: Chronic | Status: ACTIVE | Noted: 2018-01-30

## 2018-01-30 PROBLEM — I10 ESSENTIAL HYPERTENSION: Status: ACTIVE | Noted: 2018-01-30

## 2018-01-30 LAB
ANION GAP SERPL CALCULATED.3IONS-SCNC: 7 MMOL/L (ref 4–13)
BUN SERPL-MCNC: 14 MG/DL (ref 5–25)
CALCIUM SERPL-MCNC: 7.8 MG/DL (ref 8.3–10.1)
CHLORIDE SERPL-SCNC: 110 MMOL/L (ref 100–108)
CHOLEST SERPL-MCNC: 182 MG/DL (ref 50–200)
CO2 SERPL-SCNC: 23 MMOL/L (ref 21–32)
CREAT SERPL-MCNC: 0.69 MG/DL (ref 0.6–1.3)
EST. AVERAGE GLUCOSE BLD GHB EST-MCNC: 91 MG/DL
GFR SERPL CREATININE-BSD FRML MDRD: 86 ML/MIN/1.73SQ M
GLUCOSE SERPL-MCNC: 112 MG/DL (ref 65–140)
HBA1C MFR BLD: 4.8 % (ref 4.2–6.3)
HDLC SERPL-MCNC: 42 MG/DL (ref 40–60)
KCT BLD-ACNC: 266 SEC (ref 89–137)
LDLC SERPL CALC-MCNC: 113 MG/DL (ref 0–100)
POTASSIUM SERPL-SCNC: 3.8 MMOL/L (ref 3.5–5.3)
SODIUM SERPL-SCNC: 140 MMOL/L (ref 136–145)
SPECIMEN SOURCE: ABNORMAL
SPECIMEN SOURCE: ABNORMAL
TRIGL SERPL-MCNC: 137 MG/DL
TROPONIN I BLD-MCNC: 1.09 NG/ML (ref 0–0.08)
TROPONIN I SERPL-MCNC: >40 NG/ML

## 2018-01-30 PROCEDURE — 80061 LIPID PANEL: CPT | Performed by: INTERNAL MEDICINE

## 2018-01-30 PROCEDURE — 80048 BASIC METABOLIC PNL TOTAL CA: CPT | Performed by: INTERNAL MEDICINE

## 2018-01-30 PROCEDURE — C1874 STENT, COATED/COV W/DEL SYS: HCPCS

## 2018-01-30 PROCEDURE — 83036 HEMOGLOBIN GLYCOSYLATED A1C: CPT | Performed by: INTERNAL MEDICINE

## 2018-01-30 PROCEDURE — 99152 MOD SED SAME PHYS/QHP 5/>YRS: CPT | Performed by: INTERNAL MEDICINE

## 2018-01-30 PROCEDURE — 93454 CORONARY ARTERY ANGIO S&I: CPT | Performed by: INTERNAL MEDICINE

## 2018-01-30 PROCEDURE — 92928 PRQ TCAT PLMT NTRAC ST 1 LES: CPT | Performed by: INTERNAL MEDICINE

## 2018-01-30 PROCEDURE — 93306 TTE W/DOPPLER COMPLETE: CPT | Performed by: INTERNAL MEDICINE

## 2018-01-30 PROCEDURE — 027035Z DILATION OF CORONARY ARTERY, ONE ARTERY WITH TWO DRUG-ELUTING INTRALUMINAL DEVICES, PERCUTANEOUS APPROACH: ICD-10-PCS | Performed by: INTERNAL MEDICINE

## 2018-01-30 PROCEDURE — C1769 GUIDE WIRE: HCPCS | Performed by: INTERNAL MEDICINE

## 2018-01-30 PROCEDURE — C9600 PERC DRUG-EL COR STENT SING: HCPCS | Performed by: INTERNAL MEDICINE

## 2018-01-30 PROCEDURE — 84484 ASSAY OF TROPONIN QUANT: CPT | Performed by: INTERNAL MEDICINE

## 2018-01-30 PROCEDURE — C1725 CATH, TRANSLUMIN NON-LASER: HCPCS | Performed by: INTERNAL MEDICINE

## 2018-01-30 PROCEDURE — C1887 CATHETER, GUIDING: HCPCS | Performed by: INTERNAL MEDICINE

## 2018-01-30 PROCEDURE — 93306 TTE W/DOPPLER COMPLETE: CPT

## 2018-01-30 PROCEDURE — C1894 INTRO/SHEATH, NON-LASER: HCPCS | Performed by: INTERNAL MEDICINE

## 2018-01-30 PROCEDURE — 85347 COAGULATION TIME ACTIVATED: CPT

## 2018-01-30 PROCEDURE — B2101ZZ FLUOROSCOPY OF SINGLE CORONARY ARTERY USING LOW OSMOLAR CONTRAST: ICD-10-PCS | Performed by: INTERNAL MEDICINE

## 2018-01-30 RX ORDER — FENTANYL CITRATE 50 UG/ML
INJECTION, SOLUTION INTRAMUSCULAR; INTRAVENOUS CODE/TRAUMA/SEDATION MEDICATION
Status: COMPLETED | OUTPATIENT
Start: 2018-01-30 | End: 2018-01-30

## 2018-01-30 RX ORDER — HEPARIN SODIUM 1000 [USP'U]/ML
INJECTION, SOLUTION INTRAVENOUS; SUBCUTANEOUS CODE/TRAUMA/SEDATION MEDICATION
Status: COMPLETED | OUTPATIENT
Start: 2018-01-30 | End: 2018-01-30

## 2018-01-30 RX ORDER — NITROGLYCERIN 20 MG/100ML
INJECTION INTRAVENOUS CODE/TRAUMA/SEDATION MEDICATION
Status: COMPLETED | OUTPATIENT
Start: 2018-01-30 | End: 2018-01-30

## 2018-01-30 RX ORDER — SODIUM CHLORIDE 9 MG/ML
125 INJECTION, SOLUTION INTRAVENOUS CONTINUOUS
Status: DISPENSED | OUTPATIENT
Start: 2018-01-30 | End: 2018-01-30

## 2018-01-30 RX ORDER — MIDAZOLAM HYDROCHLORIDE 1 MG/ML
INJECTION INTRAMUSCULAR; INTRAVENOUS CODE/TRAUMA/SEDATION MEDICATION
Status: COMPLETED | OUTPATIENT
Start: 2018-01-30 | End: 2018-01-30

## 2018-01-30 RX ORDER — LIDOCAINE HYDROCHLORIDE 10 MG/ML
INJECTION, SOLUTION INFILTRATION; PERINEURAL CODE/TRAUMA/SEDATION MEDICATION
Status: COMPLETED | OUTPATIENT
Start: 2018-01-30 | End: 2018-01-30

## 2018-01-30 RX ADMIN — SODIUM CHLORIDE 125 ML/HR: 0.9 INJECTION, SOLUTION INTRAVENOUS at 04:59

## 2018-01-30 RX ADMIN — FENTANYL CITRATE 50 MCG: 50 INJECTION, SOLUTION INTRAMUSCULAR; INTRAVENOUS at 15:03

## 2018-01-30 RX ADMIN — MIDAZOLAM 2 MG: 1 INJECTION INTRAMUSCULAR; INTRAVENOUS at 15:03

## 2018-01-30 RX ADMIN — LIDOCAINE HYDROCHLORIDE 0.1 ML: 10 INJECTION, SOLUTION INFILTRATION; PERINEURAL at 15:03

## 2018-01-30 RX ADMIN — SODIUM CHLORIDE 125 ML/HR: 0.9 INJECTION, SOLUTION INTRAVENOUS at 13:11

## 2018-01-30 RX ADMIN — IOHEXOL 100 ML: 350 INJECTION, SOLUTION INTRAVENOUS at 15:26

## 2018-01-30 RX ADMIN — NITROGLYCERIN 400 MCG: 20 INJECTION INTRAVENOUS at 15:16

## 2018-01-30 RX ADMIN — NITROGLYCERIN 200 MCG: 20 INJECTION INTRAVENOUS at 15:22

## 2018-01-30 RX ADMIN — BIMATOPROST 1 DROP: 0.1 SOLUTION/ DROPS OPHTHALMIC at 00:04

## 2018-01-30 RX ADMIN — HEPARIN SODIUM 7500 UNITS: 1000 INJECTION INTRAVENOUS; SUBCUTANEOUS at 15:05

## 2018-01-30 RX ADMIN — ASPIRIN 81 MG 81 MG: 81 TABLET ORAL at 08:04

## 2018-01-30 RX ADMIN — BIMATOPROST 1 DROP: 0.1 SOLUTION/ DROPS OPHTHALMIC at 21:06

## 2018-01-30 RX ADMIN — TICAGRELOR 90 MG: 90 TABLET ORAL at 08:04

## 2018-01-30 RX ADMIN — ATORVASTATIN CALCIUM 80 MG: 40 TABLET, FILM COATED ORAL at 17:13

## 2018-01-30 RX ADMIN — TICAGRELOR 90 MG: 90 TABLET ORAL at 17:13

## 2018-01-30 RX ADMIN — METOPROLOL TARTRATE 25 MG: 25 TABLET ORAL at 21:06

## 2018-01-30 NOTE — PLAN OF CARE
Problem: DISCHARGE PLANNING - CARE MANAGEMENT  Goal: Discharge to post-acute care or home with appropriate resources  INTERVENTIONS:  - Conduct assessment to determine patient/family and health care team treatment goals, and need for post-acute services based on payer coverage, community resources, and patient preferences, and barriers to discharge  - Address psychosocial, clinical, and financial barriers to discharge as identified in assessment in conjunction with the patient/family and health care team  - Arrange appropriate level of post-acute services according to patients   needs and preference and payer coverage in collaboration with the physician and health care team  - Communicate with and update the patient/family, physician, and health care team regarding progress on the discharge plan  - Arrange appropriate transportation to post-acute venues  Outcome: Progressing  Will check on brilinta price/discharging med options once script is obtained from cardiologist

## 2018-01-30 NOTE — PLAN OF CARE
CARDIOVASCULAR - ADULT     Maintains optimal cardiac output and hemodynamic stability Progressing     Absence of cardiac dysrhythmias or at baseline rhythm Progressing        PAIN - ADULT     Verbalizes/displays adequate comfort level or baseline comfort level Progressing        Potential for Falls     Patient will remain free of falls Progressing        Prexisting or High Potential for Compromised Skin Integrity     Skin integrity is maintained or improved Progressing

## 2018-01-30 NOTE — PROGRESS NOTES
Subjective:  Knvg Cooper reports no complaints today  No recurrence of CP  She denied dyspnea  No wrist related issues  Objective:    VSS  VITAL SIGNS: /60   Pulse 60   Temp 98 2 °F (36 8 °C) (Temporal)   Resp 21   Ht 5' 2" (1 575 m)   Wt 68 kg (150 lb)   SpO2 97%   BMI 27 44 kg/m²   TEMPERATURE HISTORY 24H: Temp (24hrs), Av °F (36 7 °C), Min:97 2 °F (36 2 °C), Max:98 5 °F (36 9 °C)    BLOOD PRESSURE HISTORY: Systolic (90BBG), KVZ:518 , Min:93 , FGH:307    Diastolic (55YXC), ZWY:81, Min:49, Max:95      I&O:    Intake/Output Summary (Last 24 hours) at 18 1534  Last data filed at 18 1420   Gross per 24 hour   Intake          2030 86 ml   Output             1400 ml   Net           630 86 ml           PREVIOUS WEIGHTS:  Wt Readings from Last 3 Encounters:   18 68 kg (150 lb)   16 67 2 kg (148 lb 3 2 oz)   16 67 kg (147 lb 12 8 oz)      Body mass index is 27 44 kg/m²  I/O last 3 completed shifts: In: 1030 9 [I V :1030 9]  Out: 500 [Urine:500]  I/O this shift:  In: 1000 [I V :1000]  Out: 900 [Urine:900]  Vitals:    18 1729 18 2124   Weight: 68 kg (150 lb) 68 kg (150 lb)       Physical Exam   GEN: Awake, Alert  No acute distress  HEENT: Sclera anicteric, conjunctivae pink, mucous membranes moist   NECK: Supple, no carotid bruits, no JVD  HEART: Normal S1 and S2  No murmur, gallops or rubs  Rate is normal and rhythm is regular  LUNGS: Clear to auscultation bilaterally  ABDOMEN: Soft, nontender, nondistended, normoactive bowel sounds  EXTREMITIES: 2+ right radial pulse  Mild bruising and swelling of right forearm  No pedal edema  No clubbing or cyanosis  Warm extremities           Results from last 7 days  Lab Units 18  0108 18  2207 18  1738   TROPONIN I ng/mL >40 00* >40 00* 4 78*         Results from last 7 days  Lab Units 18  0458 18  1738   GLUCOSE RANDOM mg/dL 112 127   SODIUM mmol/L 140 142   POTASSIUM mmol/L 3 8 3 8 CO2 mmol/L 23 34*   CHLORIDE mmol/L 110* 105   BUN mg/dL 14 15   CREATININE mg/dL 0 69 1 01         Results from last 7 days  Lab Units 01/29/18  1738   WBC Thousand/uL 8 30   HEMOGLOBIN g/dL 15 4   HEMATOCRIT % 45 6   PLATELETS Thousands/uL 285       Results from last 7 days  Lab Units 01/29/18  1742   INR  0 87       Lab Results   Component Value Date    CHOL 182 01/30/2018     Lab Results   Component Value Date    HDL 42 01/30/2018     Lab Results   Component Value Date    LDLCALC 113 (H) 01/30/2018     Lab Results   Component Value Date    TRIG 137 01/30/2018         Results from last 7 days  Lab Units 01/29/18  1738   ALT U/L 29   AST U/L 24          Current Facility-Administered Medications:  aspirin 81 mg Oral Daily Mikie Kwok MD   atorvastatin 80 mg Oral QPM Mikie Kwok MD   bimatoprost 1 drop Both Eyes HS Millicent Hamper, CRNP   fentanyl citrate (PF)  Intravenous Code/Trauma/Sedation Pavel Pham MD   heparin (porcine)   Code/Trauma/Sedation Pavel Pham MD   iohexol  Intravenous Code/Trauma/Sedation Pavel Pham MD   isosorbide mononitrate 30 mg Oral Daily Mikie Kwok MD   lidocaine   Code/Trauma/Sedation Pavel Pham MD   metoprolol tartrate 25 mg Oral Q12H Hayden Farias MD   midazolam   Code/Trauma/Sedation Pavel Pham MD   nitroGLYcerin  Intra-arterial Code/Trauma/Sedation Pavel Pham MD   nitroGLYcerin  Other Code/Trauma/Sedation Pavel Pham MD   sodium chloride 125 mL/hr Intravenous Continuous Mikie Kwok MD   ticagrelor 90 mg Oral BID Mikie Kwok MD           TELEMETRY: No acute events noted  Continues to be in sinus rhythm  ECHO: Official read pending  My impression: LVEF 55%  Basal inf and infero-lat hypokinesis  Normal RV size and function  No sig valvular disease  Assessment/ Plan    # Acute inferior STEMI: s/p PPCI of RCA + LCx  She will undergo staged PCI of LAD today  Cont ASA + brilinta  Needs to continue high dose statin   B blocker as tolerated  LVEF normal      # Hypertension: B blocker as tolerated  # HLD: High dose statin

## 2018-01-30 NOTE — SOCIAL WORK
CM met with the patient and her  to discuss potential discharge needs  The patient lives in an apartment with her , she takes the elevator to her apartment  She is independent with adls and ambulation  Her  provides transports to any appointments  No hx of VNA or STR needs in the past  She has AD paperwork, CM requested they bring in a copy from home  She has no home pharmacy  CM received a consult for Brilinta coverage - per the patient she has no rx coverage  Cm searched needy meds, a coupon is available for $100 off for uninsured patients and there is also a patient assistance program application  CM paged cardiology resident, Gustavo Huddleston, to receive rx to run [address] [employer] [address] at [employer]  Awaiting a return call from cardiology

## 2018-01-30 NOTE — PLAN OF CARE
CARDIOVASCULAR - ADULT     Maintains optimal cardiac output and hemodynamic stability Progressing     Absence of cardiac dysrhythmias or at baseline rhythm Progressing        HEMATOLOGIC - ADULT     Maintains hematologic stability Progressing        METABOLIC, FLUID AND ELECTROLYTES - ADULT     Electrolytes maintained within normal limits Progressing     Fluid balance maintained Progressing        PAIN - ADULT     Verbalizes/displays adequate comfort level or baseline comfort level Progressing        Potential for Falls     Patient will remain free of falls Progressing        Prexisting or High Potential for Compromised Skin Integrity     Skin integrity is maintained or improved Progressing

## 2018-01-30 NOTE — CASE MANAGEMENT
Initial Clinical Review    Admission: Date/Time/Statement: 1/29/18 @ 2002     Orders Placed This Encounter   Procedures    Inpatient Admission     Standing Status:   Standing     Number of Occurrences:   1     Order Specific Question:   Admitting Physician     Answer:   Allan Hyatt     Order Specific Question:   Level of Care     Answer:   Critical Care [15]     Order Specific Question:   Estimated length of stay     Answer:   More than 2 Midnights     Order Specific Question:   Certification     Answer:   I certify that inpatient services are medically necessary for this patient for a duration of greater than two midnights  See H&P and MD Progress Notes for additional information about the patient's course of treatment  ED: Date/Time/Mode of Arrival:   ED Arrival Information     Expected Arrival Acuity Means of Arrival Escorted By Service Admission Type    - 1/29/2018 17:23 Emergent Wheelchair Family Member Cardiology Emergency    Arrival Complaint    CHEST PAIN          Chief Complaint:   Chief Complaint   Patient presents with    Chest Pain     chest pain started 10 minutes pta, constant  jaw pain  R arm pain  nausea  History of Illness:    Carter Anne is a 74y o  year old female who presented to the ER with c/o substernal chest pain that started at rest around 20 mins prior to coming to the ER  Admission EKG showed inferior ST elevations prompting activation of the cath lab  She reported 3/10 CP at the time of my initial assessment in the ER      She had been experiencing stable angina with moderate exertion for the past 2 months for which she saw a cardiologist yesterday who had recommended a stress test       She reports no chronic medical issues including hypertension, dyslipidemia or DM  She quit smoking many years ago       She is a JW      Emergent coronary angiography showed 100% occlusion of the mLCx and also of the prox RCA   These is severe multifocal stenosis of the LAD      PCI with GLENNA placement of the LCx and RCA lesions was performed      BP was significantly elevated to more than 200 mmHg at arrival  IV NTG was started at 20 mcg/min        ED Vital Signs:   ED Triage Vitals   Temperature Pulse Respirations Blood Pressure SpO2   01/29/18 1729 01/29/18 1729 01/29/18 1729 01/29/18 1739 01/29/18 1729   (!) 97 3 °F (36 3 °C) 61 20 (!) 202/95 100 %      Temp Source Heart Rate Source Patient Position - Orthostatic VS BP Location FiO2 (%)   01/29/18 2124 01/29/18 2227 01/29/18 1739 01/29/18 1739 --   Temporal Monitor Lying Right arm       Pain Score       01/29/18 1729       7        Wt Readings from Last 1 Encounters:   01/29/18 68 kg (150 lb)       Vital Signs (abnormal):    above    Abnormal Labs/Diagnostic Test Results:    Co2  34  AG  3  Troponin   4 78  CXR:  NAD    ED Treatment:   Medication Administration from 01/29/2018 1723 to 01/29/2018 2113       Date/Time Order Dose Route Action Action by Comments     01/29/2018 1743 aspirin tablet 325 mg 325 mg Oral Given Alban Sepulveda, UMESH      01/29/2018 1745 heparin (porcine) injection 3,900 Units 3,900 Units Intravenous Given Alban Sepulveda, RN      01/29/2018 1818 heparin (porcine) 25,000 units in 250 mL infusion (premix) 0 Units/kg/hr Intravenous Stopped Radha Rankin RN      01/29/2018 1750 heparin (porcine) 25,000 units in 250 mL infusion (premix) 12 Units/kg/hr Intravenous [address] Alban Sepulveda, UMESH      01/29/2018 1757 ticagrelor (BRILINTA) tablet 180 mg 180 mg Oral Given Alban Sepulveda, UMESH      01/29/2018 1818 sodium chloride 0 9 % infusion 100 mL/hr Intravenous [address] Nathalie Rehman, UMSEH      01/29/2018 1914 fentanyl citrate (PF) 100 MCG/2ML 25 mcg Intravenous Given Nathalie Rehman, UMESH      01/29/2018 1832 fentanyl citrate (PF) 100 MCG/2ML 25 mcg Intravenous Given Nathalie Rehman, UMESH      01/29/2018 1823 fentanyl citrate (PF) 100 MCG/2ML 50 mcg Intravenous Given Nathalie Rehman RN      01/29/2018 1914 midazolam (VERSED) injection 1 mg Intravenous Given Mila Lindquist RN      01/29/2018 1832 midazolam (VERSED) injection 1 mg Intravenous Given Mila Lindquist RN      01/29/2018 1823 midazolam (VERSED) injection 2 mg Intravenous Given Mila Lindquist RN      01/29/2018 1824 lidocaine (XYLOCAINE) 1 % injection 1 mL Infiltration Given Michelle Bailey MD right wrist     01/29/2018 1958 nitroGLYcerin (TRIDIL) 50 mg in 250 mL infusion (premix) 0   Stopped Radha Manley RN      01/29/2018 1833 nitroGLYcerin (TRIDIL) 50 mg in 250 mL infusion (premix) 20 mcg/min  Rate/Dose Change Radha Rankin RN bp 152/76     01/29/2018 1824 nitroGLYcerin (TRIDIL) 50 mg in 250 mL infusion (premix) 10 mcg Other [address] Mila Lindquist RN      01/29/2018 1825 nitroGLYcerin (TRIDIL) 50 mg in 250 mL infusion (premix) 200 mcg Intra-arterial Given Michelle Bailey MD      01/29/2018 1825 verapamil (ISOPTIN) injection 1 25 mg Intra-arterial Given Michelle Bailey MD      01/29/2018 1920 heparin (porcine) injection 5,000 Units Intravenous Given Mila Lindquist RN      01/29/2018 1850 heparin (porcine) injection 5,000 Units Intravenous Given Mila Lindquist RN      01/29/2018 1825 heparin (porcine) injection 4,000 Units Intravenous Given Mila Lindquist RN      01/29/2018 1930 nitroGLYcerin (TRIDIL) 50 mg in 250 mL infusion (premix) 200 mcg Other Given Michelle Bailey MD      01/29/2018 1959 iohexol (OMNIPAQUE) 350 MG/ML injection (SINGLE-DOSE) 190 mL Intravenous Given Jazz Yates MD           Past Medical/Surgical History:    Active Ambulatory Problems     Diagnosis Date Noted    No Active Ambulatory Problems     Resolved Ambulatory Problems     Diagnosis Date Noted    No Resolved Ambulatory Problems     Past Medical History:   Diagnosis Date    Glaucoma        Admitting Diagnosis: Chest pain [R07 9]  STEMI (ST elevation myocardial infarction) ([facility] [address] ) [I21 3]    Age/Sex: 74 y o  female    Assessment/Plan:    Acute inferior STEMI: s/p PCI of the LCx (this was likely a ) and of the RCA (likely culprit)  Continue DAPT  Start high dose statin  D/C NTG  Start and up-titrate B blocker  Add ACEi as tolerated  IV NS for hydration  NPO past midnight for staged PCI of LAD tomorrow   Echocardiogram in AM       # Check HbA1c and lipid panel               Expected length of stay: greater than 2 midnights        Admission Orders:   IP   1/29  @    2004  Scheduled Meds:   Current Facility-Administered Medications:  aspirin 81 mg Oral Daily Mikie Kwok MD    atorvastatin 80 mg Oral QPM Mikie Kwok MD    bimatoprost 1 drop Both Eyes HS HERBERT Valentin    isosorbide mononitrate 30 mg Oral Daily Mikie Kwok MD    metoprolol tartrate 25 mg Oral Q12H Hayden Farias MD    sodium chloride 125 mL/hr Intravenous Continuous Mikie Kwok MD Last Rate: 125 mL/hr (01/30/18 0459)   ticagrelor 90 mg Oral BID Mikie Kwok MD      Continuous Infusions:   sodium chloride 125 mL/hr Last Rate: 125 mL/hr (01/30/18 0459)     PRN Meds:     Cardiac  Diet  Serial troponin  2 DE

## 2018-01-30 NOTE — H&P
History and Physical -General Cardiology  Ihsan Shane 74 y o  female MRN: [id number]  Unit/Bed#: [facility]TON Encounter: [id number]        Principal Problem: Chest pain    Admission Status: INPATIENT     HPI: Ihsan Shane is a 74y o  year old female who presented to the ER with c/o substernal chest pain that started at rest around 20 mins prior to coming to the ER  Admission EKG showed inferior ST elevations prompting activation of the cath lab  She reported 3/10 CP at the time of my initial assessment in the ER  She had been experiencing stable angina with moderate exertion for the past 2 months for which she saw a cardiologist yesterday who had recommended a stress test      She reports no chronic medical issues including hypertension, dyslipidemia or DM  She quit smoking many years ago  She is a JW  Emergent coronary angiography showed 100% occlusion of the mLCx and also of the prox RCA  These is severe multifocal stenosis of the LAD  PCI with GLENNA placement of the LCx and RCA lesions was performed  BP was significantly elevated to more than 200 mmHg at arrival  IV NTG was started at 20 mcg/min  Review of Systems   Constitution: Negative  HENT: Negative  Respiratory: Negative  Musculoskeletal: Negative  Historical Information   Past Medical History:   Diagnosis Date    Glaucoma      Past Surgical History:   Procedure Laterality Date     SECTION       History   Alcohol Use No     History   Drug Use No     History   Smoking Status    Former Smoker   Smokeless Tobacco    Never Used     Social History     Social History    Marital status: /Civil Union     Spouse name: N/A    Number of children: N/A    Years of education: N/A     Occupational History    Not on file       Social History Main Topics    Smoking status: Former Smoker    Smokeless tobacco: Never Used    Alcohol use No    Drug use: No    Sexual activity: Not on file     Other Topics Concern    Not on file     Social History Narrative    No narrative on file       Family History:History reviewed  No pertinent family history  Meds/Allergies      NKDA    She is not on prescription medications at home  Objective   Vitals: Blood pressure (!) 203/86, pulse 64, temperature (!) 97 3 °F (36 3 °C), resp  rate 18, weight 68 kg (150 lb), SpO2 98 %  Orthostatic Blood Pressures    Flowsheet Row Most Recent Value   Blood Pressure   203/86 filed at 01/29/2018 1805   Patient Position - Orthostatic VS  Lying filed at 01/29/2018 5965        Systolic (56FMT), MEN:289 , Min:187 , MMZ:485     Diastolic (52PDV), EAM:58, Min:86, Max:95        Intake/Output Summary (Last 24 hours) at 01/29/18 2005  Last data filed at 01/29/18 1958   Gross per 24 hour   Intake            14 19 ml   Output                0 ml   Net            14 19 ml       Invasive Devices     Peripheral Intravenous Line            Peripheral IV 01/29/18 Left Antecubital less than 1 day    Peripheral IV 01/29/18 Right Antecubital less than 1 day          Line            Arterial Sheath 6 Fr  Right Radial less than 1 day              Weight (last 2 days)     Date/Time   Weight    01/29/18 1729  68 (150)              Physical Exam   Constitutional: She is oriented to person, place, and time  She appears well-developed  She appears distressed  HENT:   Head: Normocephalic  Eyes: Pupils are equal, round, and reactive to light  Neck: Neck supple  No JVD present  Cardiovascular: Normal rate, regular rhythm, normal heart sounds and intact distal pulses  Exam reveals no gallop and no friction rub  No murmur heard  Pulmonary/Chest: Effort normal and breath sounds normal  She has no wheezes  She has no rales  She exhibits no tenderness  Abdominal: Soft  Bowel sounds are normal    Neurological: She is alert and oriented to person, place, and time  Skin: Skin is warm and dry  Psychiatric: She has a normal mood and affect  Laboratory Results:    Results from last 7 days  Lab Units 01/29/18  1738   TROPONIN I ng/mL 4 78*       CBC with diff:   Results from last 7 days  Lab Units 01/29/18  1738   WBC Thousand/uL 8 30   HEMOGLOBIN g/dL 15 4   HEMATOCRIT % 45 6   MCV fL 89   PLATELETS Thousands/uL 285   MCH pg 30 1   MCHC g/dL 33 8   RDW % 14 8   MPV fL 9 8   NRBC AUTO /100 WBCs 0         CMP:  Results from last 7 days  Lab Units 01/29/18  1738   SODIUM mmol/L 142   POTASSIUM mmol/L 3 8   CHLORIDE mmol/L 105   CO2 mmol/L 34*   ANION GAP mmol/L 3*   BUN mg/dL 15   CREATININE mg/dL 1 01   GLUCOSE RANDOM mg/dL 127   CALCIUM mg/dL 9 9   AST U/L 24   ALT U/L 29   ALK PHOS U/L 72   TOTAL PROTEIN g/dL 7 2   BILIRUBIN TOTAL mg/dL 0 27   EGFR ml/min/1 73sq m 55         BMP:  Results from last 7 days  Lab Units 01/29/18  1738   SODIUM mmol/L 142   POTASSIUM mmol/L 3 8   CHLORIDE mmol/L 105   CO2 mmol/L 34*   BUN mg/dL 15   CREATININE mg/dL 1 01   GLUCOSE RANDOM mg/dL 127   CALCIUM mg/dL 9 9       BNP:   Results Reviewed     Procedure Component Value Units Date/Time    APTT six (6) hours after Heparin bolus/drip initiation or dosing change [[id number]]  (Normal) Collected:  01/29/18 1742    Lab Status:  Final result Specimen:  Blood from Arm, Left Updated:  01/29/18 1809     PTT 27 seconds     Narrative:          Therapeutic Heparin Range = 60-90 seconds    Protime-INR [[id number]]  (Abnormal) Collected:  01/29/18 1742    Lab Status:  Final result Specimen:  Blood from Arm, Left Updated:  01/29/18 1809     Protime 11 8 (L) seconds      INR 0 87    Comprehensive metabolic panel [[id number]]  (Abnormal) Collected:  01/29/18 1738    Lab Status:  Final result Specimen:  Blood from Arm, Left Updated:  01/29/18 1801     Sodium 142 mmol/L      Potassium 3 8 mmol/L      Chloride 105 mmol/L      CO2 34 (H) mmol/L      Anion Gap 3 (L) mmol/L      BUN 15 mg/dL      Creatinine 1 01 mg/dL      Glucose 127 mg/dL      Calcium 9 9 mg/dL      AST 24 U/L      ALT 29 U/L      Alkaline Phosphatase 72 U/L      Total Protein 7 2 g/dL      Albumin 3 9 g/dL      Total Bilirubin 0 27 mg/dL      eGFR 55 ml/min/1 73sq m     Narrative:         National Kidney Disease Education Program recommendations are as follows:  GFR calculation is accurate only with a steady state creatinine  Chronic Kidney disease less than 60 ml/min/1 73 sq  meters  Kidney failure less than 15 ml/min/1 73 sq  meters  Troponin I [[id number]]  (Abnormal) Collected:  01/29/18 1738    Lab Status:  Final result Specimen:  Blood from Arm, Left Updated:  01/29/18 1801     Troponin I 4 78 (H) ng/mL     Narrative:         [employer] Chemistry analyzer 99% cutoff is > 0 04 ng/mL in network labs    o cTnI 99% cutoff is useful only when applied to patients in the clinical setting of myocardial ischemia  o cTnI 99% cutoff should be interpreted in the context of clinical history, ECG findings and possibly cardiac imaging to establish correct diagnosis  o cTnI 99% cutoff may be suggestive but clearly not indicative of a coronary event without the clinical setting of myocardial ischemia  CBC and differential [[id number]]  (Normal) Collected:  01/29/18 1738    Lab Status:  Final result Specimen:  Blood from Arm, Left Updated:  01/29/18 1744     WBC 8 30 Thousand/uL      RBC 5 12 Million/uL      Hemoglobin 15 4 g/dL      Hematocrit 45 6 %      MCV 89 fL      MCH 30 1 pg      MCHC 33 8 g/dL      RDW 14 8 %      MPV 9 8 fL      Platelets 443 Thousands/uL      nRBC 0 /100 WBCs      Neutrophils Relative 49 %      Lymphocytes Relative 39 %      Monocytes Relative 9 %      Eosinophils Relative 2 %      Basophils Relative 1 %      Neutrophils Absolute 4 07 Thousands/µL      Lymphocytes Absolute 3 25 Thousands/µL      Monocytes Absolute 0 76 Thousand/µL      Eosinophils Absolute 0 17 Thousand/µL      Basophils Absolute 0 05 Thousands/µL         No results for input(s): BNP in the last 72 hours      Magnesium:       Coags:   Results from last 7 days  Lab Units 01/29/18  1742   PTT seconds 27   INR  0 87       TSH: No results found for: TSH    Hemoglobin A1C       Lipid Profile:   Lab Results   Component Value Date    CHOL 192 03/21/2016    CHOL 269 03/17/2015     Lab Results   Component Value Date    HDL 36 (L) 03/21/2016    HDL 52 03/17/2015     Lab Results   Component Value Date    LDLCALC 131 (H) 03/21/2016    LDLCALC 201 (H) 03/17/2015     Lab Results   Component Value Date    TRIG 127 03/21/2016    TRIG 78 03/17/2015   I have personally reviewed pertinent reports  EKG: Sinus rhythm  Inferior ST elevations with reciprocal changes  Assessment/Plan     # Acute inferior STEMI: s/p PCI of the LCx (this was likely a ) and of the RCA (likely culprit)  Continue DAPT  Start high dose statin  D/C NTG  Start and up-titrate B blocker  Add ACEi as tolerated  IV NS for hydration  NPO past midnight for staged PCI of LAD tomorrow  Echocardiogram in AM      # Check HbA1c and lipid panel            Expected length of stay: greater than 2 Kevin Myrick MD

## 2018-01-31 ENCOUNTER — TRANSITIONAL CARE MANAGEMENT (OUTPATIENT)
Dept: INTERNAL MEDICINE CLINIC | Facility: CLINIC | Age: 73
End: 2018-01-31

## 2018-01-31 VITALS
OXYGEN SATURATION: 97 % | HEART RATE: 66 BPM | DIASTOLIC BLOOD PRESSURE: 60 MMHG | TEMPERATURE: 99.6 F | HEIGHT: 62 IN | RESPIRATION RATE: 24 BRPM | BODY MASS INDEX: 21.58 KG/M2 | SYSTOLIC BLOOD PRESSURE: 113 MMHG | WEIGHT: 117.28 LBS

## 2018-01-31 PROBLEM — I25.10 CAD (CORONARY ARTERY DISEASE), NATIVE CORONARY ARTERY: Status: ACTIVE | Noted: 2018-01-31

## 2018-01-31 LAB
ANION GAP SERPL CALCULATED.3IONS-SCNC: 7 MMOL/L (ref 4–13)
ATRIAL RATE: 67 BPM
BUN SERPL-MCNC: 10 MG/DL (ref 5–25)
CALCIUM SERPL-MCNC: 8.5 MG/DL (ref 8.3–10.1)
CHLORIDE SERPL-SCNC: 110 MMOL/L (ref 100–108)
CO2 SERPL-SCNC: 25 MMOL/L (ref 21–32)
CREAT SERPL-MCNC: 0.83 MG/DL (ref 0.6–1.3)
GFR SERPL CREATININE-BSD FRML MDRD: 70 ML/MIN/1.73SQ M
GLUCOSE SERPL-MCNC: 100 MG/DL (ref 65–140)
P AXIS: 58 DEGREES
POTASSIUM SERPL-SCNC: 4 MMOL/L (ref 3.5–5.3)
PR INTERVAL: 170 MS
QRS AXIS: 70 DEGREES
QRSD INTERVAL: 106 MS
QT INTERVAL: 410 MS
QTC INTERVAL: 433 MS
SODIUM SERPL-SCNC: 142 MMOL/L (ref 136–145)
T WAVE AXIS: 75 DEGREES
VENTRICULAR RATE: 67 BPM

## 2018-01-31 PROCEDURE — 80048 BASIC METABOLIC PNL TOTAL CA: CPT | Performed by: INTERNAL MEDICINE

## 2018-01-31 PROCEDURE — 99239 HOSP IP/OBS DSCHRG MGMT >30: CPT | Performed by: INTERNAL MEDICINE

## 2018-01-31 PROCEDURE — 93010 ELECTROCARDIOGRAM REPORT: CPT | Performed by: INTERNAL MEDICINE

## 2018-01-31 RX ORDER — ASPIRIN 81 MG/1
81 TABLET ORAL DAILY
Status: DISCONTINUED | OUTPATIENT
Start: 2018-02-01 | End: 2018-01-31 | Stop reason: HOSPADM

## 2018-01-31 RX ORDER — CLOPIDOGREL BISULFATE 75 MG/1
75 TABLET ORAL DAILY
Status: DISCONTINUED | OUTPATIENT
Start: 2018-02-01 | End: 2018-01-31 | Stop reason: HOSPADM

## 2018-01-31 RX ORDER — ATORVASTATIN CALCIUM 20 MG/1
20 TABLET, FILM COATED ORAL EVERY EVENING
Status: DISCONTINUED | OUTPATIENT
Start: 2018-01-31 | End: 2018-01-31 | Stop reason: HOSPADM

## 2018-01-31 RX ORDER — CLOPIDOGREL BISULFATE 75 MG/1
75 TABLET ORAL DAILY
Qty: 30 TABLET | Refills: 11 | Status: SHIPPED | OUTPATIENT
Start: 2018-02-01 | End: 2019-01-17 | Stop reason: ALTCHOICE

## 2018-01-31 RX ORDER — ASPIRIN 81 MG/1
81 TABLET ORAL DAILY
Refills: 0
Start: 2018-02-01 | End: 2019-01-17 | Stop reason: ALTCHOICE

## 2018-01-31 RX ORDER — LISINOPRIL 5 MG/1
10 TABLET ORAL DAILY
Status: DISCONTINUED | OUTPATIENT
Start: 2018-01-31 | End: 2018-01-31

## 2018-01-31 RX ORDER — ATORVASTATIN CALCIUM 20 MG/1
20 TABLET, FILM COATED ORAL EVERY EVENING
Qty: 30 TABLET | Refills: 5 | Status: SHIPPED | OUTPATIENT
Start: 2018-01-31 | End: 2018-05-22 | Stop reason: SINTOL

## 2018-01-31 RX ADMIN — METOPROLOL TARTRATE 25 MG: 25 TABLET ORAL at 08:18

## 2018-01-31 RX ADMIN — LISINOPRIL 10 MG: 5 TABLET ORAL at 08:18

## 2018-01-31 RX ADMIN — ASPIRIN 81 MG 81 MG: 81 TABLET ORAL at 08:18

## 2018-01-31 RX ADMIN — TICAGRELOR 90 MG: 90 TABLET ORAL at 08:18

## 2018-01-31 NOTE — NURSING NOTE
D/c instructions given to patient and reviewed with patient and spouse  MI booklet given to patient as well as stent paperwork  Prescriptions were called into [facility] pharmacy and hard copies were given to the patient  All questions were answered

## 2018-01-31 NOTE — PROGRESS NOTES
Patient reported chest pain on her left side, occurs when she takes a breath  Denies radiation, states that she does have a history of muscle spasms  12 lead completed, BP and pulse taken

## 2018-01-31 NOTE — PLAN OF CARE
CARDIOVASCULAR - ADULT     Maintains optimal cardiac output and hemodynamic stability Progressing     Absence of cardiac dysrhythmias or at baseline rhythm Progressing        DISCHARGE PLANNING - CARE MANAGEMENT     Discharge to post-acute care or home with appropriate resources Progressing        PAIN - ADULT     Verbalizes/displays adequate comfort level or baseline comfort level Progressing        Potential for Falls     Patient will remain free of falls Progressing        Prexisting or High Potential for Compromised Skin Integrity     Skin integrity is maintained or improved Progressing

## 2018-01-31 NOTE — PROGRESS NOTES
Progress Note - Farrukh Parada 74 y o  female MRN: [id number]    Unit/Bed#: ICU 08 Encounter: [id number]  Subjective:   No chest pain or SOB  No palpititations edema or lightheadedness    Objective:   Vitals: Blood pressure 144/82, pulse 66, temperature 99 6 °F (37 6 °C), temperature source Temporal, resp  rate 20, height 5' 2" (1 575 m), weight 53 2 kg (117 lb 4 6 oz), SpO2 95 %  ,Body mass index is 21 45 kg/m²  CMP:   Results from last 7 days  Lab Units 01/31/18  0457  01/29/18  1738   SODIUM mmol/L 142  < > 142   POTASSIUM mmol/L 4 0  < > 3 8   CHLORIDE mmol/L 110*  < > 105   CO2 mmol/L 25  < > 34*   ANION GAP mmol/L 7  < > 3*   BUN mg/dL 10  < > 15   CREATININE mg/dL 0 83  < > 1 01   GLUCOSE RANDOM mg/dL 100  < > 127   CALCIUM mg/dL 8 5  < > 9 9   AST U/L  --   --  24   ALT U/L  --   --  29   ALK PHOS U/L  --   --  72   TOTAL PROTEIN g/dL  --   --  7 2   BILIRUBIN TOTAL mg/dL  --   --  0 27   EGFR ml/min/1 73sq m 70  < > 55   < > = values in this interval not displayed  Physical exam:  Lungs-clear w/o wheezing rhonchi or rales  Heart-regular without murmur rub or gallop  Abd-NT w/o mass or OM  Ext-no edema pulses intact    Modest hematoma R wrist    Assessment:  1/ acute inferior MI-in part aborted by acute stenting of the RCA x2    Additional stenting of the CX and LAD x2 (LAD done yesterday)  2/ Modest HLP  3/ borderline HTN    Plan:  Stable-plan for discharge later today if no rhythm disturbances  Plan to DC on   ASA 81 mg daily (reduced due to macular degeneration)  Plavix 75 mg daily (150 this PM and 150 tomorrow AM)  Conchetta Lob afford brilinta  Metoprolol 25 mg BID  Atorvastatin 20 mg daily  No strenuous exercise until seen in 2 weeks  Will arrange FU as OP in our office in 2 weeks

## 2018-01-31 NOTE — DISCHARGE SUMMARY
ADMIT DATE 1/29/18    DC DATE 1/31/18    DXS:  Acute inferior MI  CAD s/p stenting of RCA/CX/LAD this admission  Hyperlipidemia  Borderline HTN    HPI:  The patient is a 74year old WF with the recent onset of exertional chest pain and SOB  She saw a cardiologist at [facility] and a stress test was ordered  She came to the hospital on the evening of 1/29/18 with acute chest pain  ECG showed an acute inferior MI  There was associated SOB  PE: see dictated PE    Hospital Course: The patient had cardiac catheterization which showed an occluded RCA (culprit), occluded mid CX vessel and 90% LAD lesion  She underwent acute stenting of the RCA x2 with GLENNA and the CX x1 with a GLENNA  She was brought back for treatment of the LAD and received 2 GLENNA on 1/3018  She had resolution of pain and had no rhythm disturbances  She had an echo which showed hypokinesis of the basal to mid inferolateral walls with an overall EF of 55%  She was pain free at time of discharge with stable vital signs  She will be discharged home on clopidogrel 75 mg daily but is to take 150 mg the PM of discharge and the AM of 2/1/18 since she could not afford Brilinta  She will continue ASA 81 mg daily  A statin was added as was a beta blocker  We will set up FU in our office in 2 weeks time  She was told to refrain from engaging in any exercise other than walking  She was advised to follow a low NA, low cholesterol diet             Kevan Leventhal MD

## 2018-02-01 ENCOUNTER — TELEPHONE (OUTPATIENT)
Dept: CARDIOLOGY CLINIC | Facility: CLINIC | Age: 73
End: 2018-02-01

## 2018-02-01 NOTE — TELEPHONE ENCOUNTER
Pt is seeing Dr Cynthia Cabezas, provided Pt Þorjose a office phone # & transferred her to correct office

## 2018-02-01 NOTE — TELEPHONE ENCOUNTER
Patient discharged yest from [facility]   She had card cath requiring 3stents  She is sched for office follow up with  Dr Jesse Mendoza 2/26/18  Patient would like to start cardiac rehab  Explained to patient discharge instructions are not to do any exercise besides walking  Told patient Dr Jesse Mendoza would discuss cardiac rehab at office visit and would make recommendations at that time  Patient agreeable and will discuss at follow up appt

## 2018-02-06 ENCOUNTER — TRANSCRIBE ORDERS (OUTPATIENT)
Dept: LAB | Facility: CLINIC | Age: 73
End: 2018-02-06

## 2018-02-06 ENCOUNTER — APPOINTMENT (OUTPATIENT)
Dept: LAB | Facility: CLINIC | Age: 73
End: 2018-02-06
Payer: MEDICARE

## 2018-02-06 DIAGNOSIS — D50.9 NORMOCYTIC HYPOCHROMIC ANEMIA: ICD-10-CM

## 2018-02-06 DIAGNOSIS — R53.83 OTHER FATIGUE: ICD-10-CM

## 2018-02-06 DIAGNOSIS — D50.9 IRON DEFICIENCY ANEMIA, UNSPECIFIED IRON DEFICIENCY ANEMIA TYPE: Primary | ICD-10-CM

## 2018-02-06 DIAGNOSIS — R79.9 ABNORMAL BLOOD CHEMISTRY: ICD-10-CM

## 2018-02-06 DIAGNOSIS — D50.9 IRON DEFICIENCY ANEMIA, UNSPECIFIED IRON DEFICIENCY ANEMIA TYPE: ICD-10-CM

## 2018-02-06 LAB
FERRITIN SERPL-MCNC: 607 NG/ML (ref 8–388)
FOLATE SERPL-MCNC: >20 NG/ML (ref 3.1–17.5)
IRON SATN MFR SERPL: 37 %
IRON SERPL-MCNC: 86 UG/DL (ref 50–170)
TIBC SERPL-MCNC: 233 UG/DL (ref 250–450)
VIT B12 SERPL-MCNC: 909 PG/ML (ref 100–900)

## 2018-02-06 PROCEDURE — 36415 COLL VENOUS BLD VENIPUNCTURE: CPT

## 2018-02-06 PROCEDURE — 83550 IRON BINDING TEST: CPT

## 2018-02-06 PROCEDURE — 82607 VITAMIN B-12: CPT

## 2018-02-06 PROCEDURE — 82728 ASSAY OF FERRITIN: CPT

## 2018-02-06 PROCEDURE — 83540 ASSAY OF IRON: CPT

## 2018-02-06 PROCEDURE — 82746 ASSAY OF FOLIC ACID SERUM: CPT

## 2018-02-21 RX ORDER — ALBUTEROL SULFATE 90 UG/1
1-2 AEROSOL, METERED RESPIRATORY (INHALATION)
COMMUNITY
Start: 2014-02-19 | End: 2019-01-17 | Stop reason: ALTCHOICE

## 2018-02-21 RX ORDER — ALPRAZOLAM 1 MG/1
TABLET ORAL
COMMUNITY
Start: 2014-06-16 | End: 2019-05-16

## 2018-02-21 RX ORDER — SULFAMETHOXAZOLE AND TRIMETHOPRIM 400; 80 MG/1; MG/1
1 TABLET ORAL 2 TIMES DAILY
COMMUNITY
Start: 2016-08-01 | End: 2018-05-22 | Stop reason: ALTCHOICE

## 2018-02-26 ENCOUNTER — OFFICE VISIT (OUTPATIENT)
Dept: CARDIOLOGY CLINIC | Facility: CLINIC | Age: 73
End: 2018-02-26
Payer: MEDICARE

## 2018-02-26 VITALS
HEART RATE: 63 BPM | HEIGHT: 62 IN | SYSTOLIC BLOOD PRESSURE: 118 MMHG | DIASTOLIC BLOOD PRESSURE: 68 MMHG | BODY MASS INDEX: 28.89 KG/M2 | WEIGHT: 157 LBS

## 2018-02-26 DIAGNOSIS — R03.0 BORDERLINE HYPERTENSION: ICD-10-CM

## 2018-02-26 DIAGNOSIS — I25.10 CORONARY ARTERY DISEASE INVOLVING NATIVE CORONARY ARTERY OF NATIVE HEART WITHOUT ANGINA PECTORIS: Primary | ICD-10-CM

## 2018-02-26 DIAGNOSIS — I25.2 F/U OF INFERIOR MYOCARDIAL INFARCTION: ICD-10-CM

## 2018-02-26 DIAGNOSIS — Z09 F/U OF INFERIOR MYOCARDIAL INFARCTION: ICD-10-CM

## 2018-02-26 DIAGNOSIS — I21.3 ST ELEVATION MYOCARDIAL INFARCTION (STEMI), UNSPECIFIED ARTERY (HCC): ICD-10-CM

## 2018-02-26 PROCEDURE — 99214 OFFICE O/P EST MOD 30 MIN: CPT | Performed by: INTERNAL MEDICINE

## 2018-02-26 PROCEDURE — 93000 ELECTROCARDIOGRAM COMPLETE: CPT | Performed by: INTERNAL MEDICINE

## 2018-02-26 NOTE — PROGRESS NOTES
Cardiology Follow Up    Chuy Diaz  1945  [id number]  [phone] [id number]    Reason for visit:  Post hospital visit status post acute ST elevation infarction of the inferior wall  Had relatively preserved ejection fraction  Also has hypertension and hyperlipidemia  Patient receive stenting of all 3 major vessels  1  Coronary artery disease involving native coronary artery of native heart without angina pectoris  POCT ECG   2  Acute ST elevation myocardial infarction (STEMI) of inferior wall (HCC)  POCT ECG   3  Essential hypertension         Interval History:   Since her DC she has had fatigue  She does have some LUZ  She denies anginal chest pain  She denies edema  She denies palpitations or dizziness  She does get some vague chest discomfort unrelated to activity    Patient Active Problem List   Diagnosis    Acute ST elevation myocardial infarction (STEMI) of inferior wall ([address] )    Essential hypertension    Dyslipidemia    CAD (coronary artery disease), native coronary artery     Past Medical History:   Diagnosis Date    Glaucoma      Social History     Social History    Marital status: /Civil Union     Spouse name: N/A    Number of children: N/A    Years of education: N/A     Occupational History    Not on file  Social History Main Topics    Smoking status: Former Smoker    Smokeless tobacco: Never Used    Alcohol use No    Drug use: No    Sexual activity: Not on file     Other Topics Concern    Not on file     Social History Narrative    No narrative on file      No family history on file    Past Surgical History:   Procedure Laterality Date     SECTION      CORONARY ANGIOPLASTY WITH STENT PLACEMENT         Current Outpatient Prescriptions:     albuterol (PROAIR HFA) 90 mcg/act inhaler, Inhale 1-2 puffs, Disp: , Rfl:     aspirin (ECOTRIN LOW STRENGTH) 81 mg EC tablet, Take 1 tablet (81 mg total) by mouth daily, Disp: , Rfl: 0    atorvastatin (LIPITOR) 20 mg tablet, Take 1 tablet (20 mg total) by mouth every evening, Disp: 30 tablet, Rfl: 5    bimatoprost (LUMIGAN) 0 01 % ophthalmic drops, Administer 1 drop to both eyes daily at bedtime, Disp: , Rfl:     clopidogrel (PLAVIX) 75 mg tablet, Take 1 tablet (75 mg total) by mouth daily, Disp: 30 tablet, Rfl: 11    conjugated estrogens (PREMARIN) vaginal cream, Insert into the vagina, Disp: , Rfl:     metoprolol tartrate (LOPRESSOR) 25 mg tablet, Take 1 tablet (25 mg total) by mouth every 12 (twelve) hours, Disp: 60 tablet, Rfl: 5    ALPRAZolam (XANAX) 1 mg tablet, Take by mouth, Disp: , Rfl:     sulfamethoxazole-trimethoprim (BACTRIM) 400-80 mg per tablet, Take 1 tablet by mouth 2 (two) times a day, Disp: , Rfl:   No Known Allergies        Review of Systems:  Review of Systems   Constitutional: Positive for fatigue  Negative for appetite change and unexpected weight change  Respiratory: Positive for shortness of breath  Negative for cough and wheezing  Cardiovascular: Positive for chest pain  Negative for palpitations and leg swelling  Gastrointestinal: Negative for blood in stool, constipation and diarrhea  Genitourinary: Positive for frequency  Musculoskeletal: Positive for back pain  Negative for arthralgias  Neurological: Negative for speech difficulty, light-headedness and numbness  Psychiatric/Behavioral: Negative for agitation, behavioral problems, confusion and decreased concentration  Physical Exam:  Vitals:    02/26/18 1502   BP: 118/68   Pulse: 63   Weight: 71 2 kg (157 lb)   Height: 5' 2" (1 575 m)     Physical Exam   Constitutional: She is oriented to person, place, and time  She appears well-developed and well-nourished  No distress  HENT:   Head: Normocephalic and atraumatic  Mouth/Throat: No oropharyngeal exudate  Eyes: Conjunctivae are normal  No scleral icterus  Neck: Neck supple  Normal carotid pulses and no JVD present  Carotid bruit is not present  No thyromegaly present  Cardiovascular: Normal rate, regular rhythm and intact distal pulses  Exam reveals no gallop and no friction rub  Murmur heard  Crescendo decrescendo systolic murmur is present with a grade of 1/6    No diastolic murmur is present   RUSB basal systolic murmur   Pulmonary/Chest: Breath sounds normal  She has no wheezes  She has no rhonchi  She has no rales  Abdominal: Soft  She exhibits no mass  There is no tenderness  Musculoskeletal: She exhibits no edema, tenderness or deformity  Neurological: She is alert and oriented to person, place, and time  She has normal strength  No cranial nerve deficit or sensory deficit  Skin: Skin is warm and dry  No rash noted  No erythema  No pallor  Psychiatric: She has a normal mood and affect  Her behavior is normal  Judgment and thought content normal        Discussion/Summary:  1  CAD  Recent inferior infarction in part aborted by acute stenting of the right coronary artery  Patient also had stenting of the obtuse marginal and left anterior descending artery in stages  Not having any symptoms to suggest angina pectoris  She is complaining of fatigue which may be related to low-dose beta-blocker therapy  I will reduce  Her metoprolol to 12 5 mg every 12 hours  She is to continue dual antiplatelet agents going forward  2   Follow-up of inferior wall myocardial infarction  Small Q-wave seen on EKG and overall junction fraction preserved with inferior basal hypokinesis  Have approved patient to start cardiac rehabilitation  3  Borderline hypertension  Blood pressure  Is normal on very low-dose beta-blockers and doubt that she has hypertension  Will reduce beta-blocker due to side effects but try to keep her on a low-dose going forward  4  Modest hyperlipidemia    In the absence of coronary disease would probably not treat these levels but with known coronary disease and no other correctable risk factors will shoot to get LDL cholesterol to less than 70   Repeat LDL cholesterol in SGOT ordered for next month      Follow-up  3 months time    Ivonne Hurst MD

## 2018-03-13 ENCOUNTER — APPOINTMENT (OUTPATIENT)
Dept: LAB | Facility: CLINIC | Age: 73
End: 2018-03-13
Payer: MEDICARE

## 2018-03-13 ENCOUNTER — TRANSCRIBE ORDERS (OUTPATIENT)
Dept: LAB | Facility: CLINIC | Age: 73
End: 2018-03-13

## 2018-03-13 DIAGNOSIS — I25.10 CORONARY ARTERY DISEASE INVOLVING NATIVE CORONARY ARTERY OF NATIVE HEART WITHOUT ANGINA PECTORIS: ICD-10-CM

## 2018-03-13 LAB
AST SERPL W P-5'-P-CCNC: 20 U/L (ref 5–45)
LDLC SERPL DIRECT ASSAY-MCNC: 87 MG/DL (ref 0–100)

## 2018-03-13 PROCEDURE — 83721 ASSAY OF BLOOD LIPOPROTEIN: CPT

## 2018-03-13 PROCEDURE — 36415 COLL VENOUS BLD VENIPUNCTURE: CPT

## 2018-03-13 PROCEDURE — 84450 TRANSFERASE (AST) (SGOT): CPT

## 2018-05-08 RX ORDER — DIPHENOXYLATE HYDROCHLORIDE AND ATROPINE SULFATE 2.5; .025 MG/1; MG/1
1 TABLET ORAL
COMMUNITY
End: 2019-05-16

## 2018-05-22 ENCOUNTER — OFFICE VISIT (OUTPATIENT)
Dept: CARDIOLOGY CLINIC | Facility: CLINIC | Age: 73
End: 2018-05-22
Payer: MEDICARE

## 2018-05-22 VITALS
BODY MASS INDEX: 28.89 KG/M2 | HEART RATE: 72 BPM | SYSTOLIC BLOOD PRESSURE: 112 MMHG | WEIGHT: 157 LBS | DIASTOLIC BLOOD PRESSURE: 80 MMHG | HEIGHT: 62 IN

## 2018-05-22 DIAGNOSIS — I25.10 CORONARY ARTERY DISEASE INVOLVING NATIVE CORONARY ARTERY OF NATIVE HEART WITHOUT ANGINA PECTORIS: Primary | ICD-10-CM

## 2018-05-22 DIAGNOSIS — E78.5 DYSLIPIDEMIA: Chronic | ICD-10-CM

## 2018-05-22 DIAGNOSIS — R03.0 BORDERLINE HYPERTENSION: ICD-10-CM

## 2018-05-22 PROCEDURE — 99213 OFFICE O/P EST LOW 20 MIN: CPT | Performed by: INTERNAL MEDICINE

## 2018-05-22 RX ORDER — PRAVASTATIN SODIUM 10 MG
10 TABLET ORAL
Qty: 90 TABLET | Refills: 3 | Status: SHIPPED | OUTPATIENT
Start: 2018-05-22 | End: 2019-01-17 | Stop reason: ALTCHOICE

## 2018-05-22 RX ORDER — MELATONIN
DAILY
COMMUNITY
End: 2019-05-16

## 2018-05-22 RX ORDER — FOLIC ACID 0.8 MG
TABLET ORAL
COMMUNITY
End: 2019-05-16

## 2018-05-22 NOTE — PROGRESS NOTES
Cardiology Follow Up    Francisco Javier Avendaño  1945  [id number]  [address] [place] [id number]    Reason for Visit; 3 month follow-up for CAD with inferior ST-elevation myocardial infarction in late   Coronary artery disease involving native coronary artery of native heart without angina pectoris     2  Dyslipidemia     3  Borderline hypertension         Interval History:  Since the patient's last visit, she completed 3 weeks of cardiac rehab  She does have fatigue but this is better  She denies dyspnea or chest discomfort  She denies edema, palpitations or lightheadedness  She was unable to tolerate her statin with myalgias    Patient Active Problem List   Diagnosis    Acute ST elevation myocardial infarction (STEMI) of inferior wall (HCC)    Borderline hypertension    Dyslipidemia    CAD (coronary artery disease), native coronary artery     Past Medical History:   Diagnosis Date    Glaucoma      Social History     Social History    Marital status: /Civil Union     Spouse name: N/A    Number of children: N/A    Years of education: N/A     Occupational History    Not on file  Social History Main Topics    Smoking status: Former Smoker    Smokeless tobacco: Never Used    Alcohol use No    Drug use: No    Sexual activity: Not on file     Other Topics Concern    Not on file     Social History Narrative    No narrative on file      No family history on file    Past Surgical History:   Procedure Laterality Date     SECTION      CORONARY ANGIOPLASTY WITH STENT PLACEMENT         Current Outpatient Prescriptions:     albuterol (PROAIR HFA) 90 mcg/act inhaler, Inhale 1-2 puffs, Disp: , Rfl:     ALPRAZolam (XANAX) 1 mg tablet, Take by mouth, Disp: , Rfl:     aspirin (ECOTRIN LOW STRENGTH) 81 mg EC tablet, Take 1 tablet (81 mg total) by mouth daily, Disp: , Rfl: 0   bimatoprost (LUMIGAN) 0 01 % ophthalmic drops, Administer 1 drop to both eyes daily at bedtime, Disp: , Rfl:     cholecalciferol (VITAMIN D3) 1,000 units tablet, Take by mouth daily, Disp: , Rfl:     clopidogrel (PLAVIX) 75 mg tablet, Take 1 tablet (75 mg total) by mouth daily, Disp: 30 tablet, Rfl: 11    co-enzyme Q-10 30 mg, Take 30 mg by mouth, Disp: , Rfl:     conjugated estrogens (PREMARIN) vaginal cream, Insert into the vagina, Disp: , Rfl:     Magnesium 500 MG CAPS, Take by mouth, Disp: , Rfl:     metoprolol tartrate (LOPRESSOR) 25 mg tablet, Take 0 5 tablets (12 5 mg total) by mouth every 12 (twelve) hours, Disp: 60 tablet, Rfl: 0    multivitamin (THERAGRAN) TABS, Take 1 tablet by mouth, Disp: , Rfl:     Omega-3 Fatty Acids (FISH OIL PO), Take 2 g by mouth, Disp: , Rfl:   No Known Allergies      Review of Systems:  Review of Systems   Constitutional: Positive for fatigue  Negative for appetite change and unexpected weight change  Respiratory: Negative for cough, shortness of breath and wheezing  Cardiovascular: Negative for chest pain, palpitations and leg swelling  Gastrointestinal: Negative for blood in stool, constipation and diarrhea  Genitourinary: Positive for frequency  Musculoskeletal: Negative for arthralgias and back pain  Neurological: Negative for speech difficulty, light-headedness and numbness  Psychiatric/Behavioral: Negative for agitation, behavioral problems, confusion and decreased concentration  Physical Exam:  Vitals:    05/22/18 0857   BP: 112/80   BP Location: Left arm   Patient Position: Sitting   Cuff Size: Standard   Pulse: 72   Weight: 71 2 kg (157 lb)   Height: 5' 2" (1 575 m)       Physical Exam   Constitutional: She is oriented to person, place, and time  She appears well-developed and well-nourished  No distress  HENT:   Head: Normocephalic and atraumatic  Mouth/Throat: No oropharyngeal exudate     Eyes: Conjunctivae are normal  No scleral icterus  Neck: Neck supple  Normal carotid pulses and no JVD present  Carotid bruit is not present  No thyromegaly present  Cardiovascular: Normal rate, regular rhythm and intact distal pulses  Exam reveals no gallop and no friction rub  Murmur heard  Crescendo decrescendo systolic murmur is present with a grade of 1/6    No diastolic murmur is present   RUSB basal systolic murmur   Pulmonary/Chest: Breath sounds normal  She has no wheezes  She has no rhonchi  She has no rales  Abdominal: Soft  She exhibits no mass  There is no tenderness  Musculoskeletal: She exhibits no edema, tenderness or deformity  Neurological: She is alert and oriented to person, place, and time  She has normal strength  No cranial nerve deficit or sensory deficit  Skin: Skin is warm and dry  No rash noted  No erythema  No pallor  Psychiatric: She has a normal mood and affect  Her behavior is normal  Judgment and thought content normal        Discussion/Summary:  1  CAD-s/p multiple stents post acute inferior MI in January (all 3 systems)  Having no angina on low dose beta blocker  On DAPT for one year post stent (at least)  2  Dyslipidemia  Baseline LDL only 113 on admission but statin indicated  Atorvastatin 20 mg daily poorly tolerated with LDL of 87  Has stopped due to myalgias  Have advised pravastatin 10 mg HS    If tolerated will check LDL and SGOT in 2 months  3  Borderline HTN  Clearly not an issue with patient having low normal blood pressure metoprolol    Continue same    Follow-up 6 months    Rukhsana Richardson MD

## 2019-01-17 ENCOUNTER — APPOINTMENT (EMERGENCY)
Dept: RADIOLOGY | Facility: HOSPITAL | Age: 74
DRG: 247 | End: 2019-01-17
Payer: MEDICARE

## 2019-01-17 ENCOUNTER — HOSPITAL ENCOUNTER (INPATIENT)
Facility: HOSPITAL | Age: 74
LOS: 1 days | Discharge: HOME | DRG: 247 | End: 2019-01-19
Attending: EMERGENCY MEDICINE | Admitting: INTERNAL MEDICINE
Payer: MEDICARE

## 2019-01-17 DIAGNOSIS — I25.10 CORONARY ARTERY DISEASE INVOLVING NATIVE CORONARY ARTERY OF NATIVE HEART WITHOUT ANGINA PECTORIS: ICD-10-CM

## 2019-01-17 DIAGNOSIS — R77.8 ELEVATED TROPONIN: ICD-10-CM

## 2019-01-17 DIAGNOSIS — I25.119 CORONARY ARTERY DISEASE INVOLVING NATIVE CORONARY ARTERY OF NATIVE HEART WITH ANGINA PECTORIS (HCC): ICD-10-CM

## 2019-01-17 DIAGNOSIS — R03.0 ELEVATED BLOOD PRESSURE READING: ICD-10-CM

## 2019-01-17 DIAGNOSIS — I25.2 HISTORY OF MI (MYOCARDIAL INFARCTION): Chronic | ICD-10-CM

## 2019-01-17 DIAGNOSIS — I21.4 NSTEMI (NON-ST ELEVATED MYOCARDIAL INFARCTION) (HCC): ICD-10-CM

## 2019-01-17 DIAGNOSIS — M79.601 ARM PAIN, ANTERIOR, RIGHT: Primary | ICD-10-CM

## 2019-01-17 LAB
ALBUMIN SERPL BCP-MCNC: 3.7 G/DL (ref 3.5–5)
ALP SERPL-CCNC: 71 U/L (ref 46–116)
ALT SERPL W P-5'-P-CCNC: 31 U/L (ref 12–78)
ANION GAP SERPL CALCULATED.3IONS-SCNC: 10 MMOL/L (ref 4–13)
APTT PPP: 28 SECONDS (ref 26–38)
AST SERPL W P-5'-P-CCNC: 18 U/L (ref 5–45)
BASOPHILS # BLD AUTO: 0.05 THOUSANDS/ΜL (ref 0–0.1)
BASOPHILS NFR BLD AUTO: 1 % (ref 0–1)
BILIRUB SERPL-MCNC: 0.15 MG/DL (ref 0.2–1)
BUN SERPL-MCNC: 24 MG/DL (ref 5–25)
CALCIUM SERPL-MCNC: 9.7 MG/DL (ref 8.3–10.1)
CHLORIDE SERPL-SCNC: 106 MMOL/L (ref 100–108)
CO2 SERPL-SCNC: 29 MMOL/L (ref 21–32)
CREAT SERPL-MCNC: 0.98 MG/DL (ref 0.6–1.3)
EOSINOPHIL # BLD AUTO: 0.21 THOUSAND/ΜL (ref 0–0.61)
EOSINOPHIL NFR BLD AUTO: 3 % (ref 0–6)
ERYTHROCYTE [DISTWIDTH] IN BLOOD BY AUTOMATED COUNT: 13.1 % (ref 11.6–15.1)
GFR SERPL CREATININE-BSD FRML MDRD: 57 ML/MIN/1.73SQ M
GLUCOSE SERPL-MCNC: 106 MG/DL (ref 65–140)
HCT VFR BLD AUTO: 43.5 % (ref 34.8–46.1)
HGB BLD-MCNC: 13.7 G/DL (ref 11.5–15.4)
IMM GRANULOCYTES # BLD AUTO: 0.01 THOUSAND/UL (ref 0–0.2)
IMM GRANULOCYTES NFR BLD AUTO: 0 % (ref 0–2)
INR PPP: 0.92 (ref 0.86–1.17)
LYMPHOCYTES # BLD AUTO: 2.79 THOUSANDS/ΜL (ref 0.6–4.47)
LYMPHOCYTES NFR BLD AUTO: 44 % (ref 14–44)
MAGNESIUM SERPL-MCNC: 2.8 MG/DL (ref 1.6–2.6)
MCH RBC QN AUTO: 28.5 PG (ref 26.8–34.3)
MCHC RBC AUTO-ENTMCNC: 31.5 G/DL (ref 31.4–37.4)
MCV RBC AUTO: 91 FL (ref 82–98)
MONOCYTES # BLD AUTO: 0.45 THOUSAND/ΜL (ref 0.17–1.22)
MONOCYTES NFR BLD AUTO: 7 % (ref 4–12)
NEUTROPHILS # BLD AUTO: 2.84 THOUSANDS/ΜL (ref 1.85–7.62)
NEUTS SEG NFR BLD AUTO: 45 % (ref 43–75)
NRBC BLD AUTO-RTO: 0 /100 WBCS
PLATELET # BLD AUTO: 327 THOUSANDS/UL (ref 149–390)
PMV BLD AUTO: 8.6 FL (ref 8.9–12.7)
POTASSIUM SERPL-SCNC: 4 MMOL/L (ref 3.5–5.3)
PROT SERPL-MCNC: 6.9 G/DL (ref 6.4–8.2)
PROTHROMBIN TIME: 12.5 SECONDS (ref 11.8–14.2)
RBC # BLD AUTO: 4.8 MILLION/UL (ref 3.81–5.12)
SODIUM SERPL-SCNC: 145 MMOL/L (ref 136–145)
TROPONIN I SERPL-MCNC: 0.02 NG/ML
WBC # BLD AUTO: 6.35 THOUSAND/UL (ref 4.31–10.16)

## 2019-01-17 PROCEDURE — 80053 COMPREHEN METABOLIC PANEL: CPT | Performed by: EMERGENCY MEDICINE

## 2019-01-17 PROCEDURE — 1123F ACP DISCUSS/DSCN MKR DOCD: CPT | Performed by: INTERNAL MEDICINE

## 2019-01-17 PROCEDURE — 36415 COLL VENOUS BLD VENIPUNCTURE: CPT | Performed by: EMERGENCY MEDICINE

## 2019-01-17 PROCEDURE — 99285 EMERGENCY DEPT VISIT HI MDM: CPT

## 2019-01-17 PROCEDURE — 84484 ASSAY OF TROPONIN QUANT: CPT | Performed by: EMERGENCY MEDICINE

## 2019-01-17 PROCEDURE — 93005 ELECTROCARDIOGRAM TRACING: CPT

## 2019-01-17 PROCEDURE — 85025 COMPLETE CBC W/AUTO DIFF WBC: CPT | Performed by: EMERGENCY MEDICINE

## 2019-01-17 PROCEDURE — 71046 X-RAY EXAM CHEST 2 VIEWS: CPT

## 2019-01-17 PROCEDURE — 85610 PROTHROMBIN TIME: CPT | Performed by: EMERGENCY MEDICINE

## 2019-01-17 PROCEDURE — 83735 ASSAY OF MAGNESIUM: CPT | Performed by: EMERGENCY MEDICINE

## 2019-01-17 PROCEDURE — 85730 THROMBOPLASTIN TIME PARTIAL: CPT | Performed by: EMERGENCY MEDICINE

## 2019-01-17 RX ORDER — ONDANSETRON 2 MG/ML
4 INJECTION INTRAMUSCULAR; INTRAVENOUS ONCE AS NEEDED
Status: DISCONTINUED | OUTPATIENT
Start: 2019-01-17 | End: 2019-01-18

## 2019-01-17 RX ORDER — ACETAMINOPHEN 325 MG/1
650 TABLET ORAL ONCE
Status: COMPLETED | OUTPATIENT
Start: 2019-01-17 | End: 2019-01-17

## 2019-01-17 RX ORDER — ASPIRIN 81 MG/1
324 TABLET, CHEWABLE ORAL ONCE
Status: COMPLETED | OUTPATIENT
Start: 2019-01-17 | End: 2019-01-17

## 2019-01-17 RX ADMIN — ASPIRIN 81 MG 324 MG: 81 TABLET ORAL at 21:34

## 2019-01-17 RX ADMIN — ACETAMINOPHEN 650 MG: 325 TABLET, FILM COATED ORAL at 21:34

## 2019-01-18 ENCOUNTER — APPOINTMENT (INPATIENT)
Dept: NON INVASIVE DIAGNOSTICS | Facility: HOSPITAL | Age: 74
DRG: 247 | End: 2019-01-18
Payer: MEDICARE

## 2019-01-18 PROBLEM — M79.601 RIGHT ARM PAIN: Status: ACTIVE | Noted: 2019-01-18

## 2019-01-18 PROBLEM — R79.89 ELEVATED TROPONIN: Status: ACTIVE | Noted: 2019-01-18

## 2019-01-18 PROBLEM — R77.8 ELEVATED TROPONIN: Status: ACTIVE | Noted: 2019-01-18

## 2019-01-18 PROBLEM — I25.2 HISTORY OF MI (MYOCARDIAL INFARCTION): Chronic | Status: ACTIVE | Noted: 2019-01-18

## 2019-01-18 PROBLEM — R03.0 ELEVATED BLOOD PRESSURE READING: Status: ACTIVE | Noted: 2019-01-18

## 2019-01-18 PROBLEM — I21.4 NSTEMI (NON-ST ELEVATED MYOCARDIAL INFARCTION) (HCC): Status: ACTIVE | Noted: 2019-01-18

## 2019-01-18 LAB
ANION GAP SERPL CALCULATED.3IONS-SCNC: 10 MMOL/L (ref 4–13)
APTT PPP: 31 SECONDS (ref 26–38)
BASOPHILS # BLD AUTO: 0.04 THOUSANDS/ΜL (ref 0–0.1)
BASOPHILS NFR BLD AUTO: 1 % (ref 0–1)
BUN SERPL-MCNC: 21 MG/DL (ref 5–25)
CALCIUM SERPL-MCNC: 9.6 MG/DL (ref 8.3–10.1)
CHLORIDE SERPL-SCNC: 108 MMOL/L (ref 100–108)
CO2 SERPL-SCNC: 25 MMOL/L (ref 21–32)
CREAT SERPL-MCNC: 0.95 MG/DL (ref 0.6–1.3)
EOSINOPHIL # BLD AUTO: 0.27 THOUSAND/ΜL (ref 0–0.61)
EOSINOPHIL NFR BLD AUTO: 4 % (ref 0–6)
ERYTHROCYTE [DISTWIDTH] IN BLOOD BY AUTOMATED COUNT: 13.1 % (ref 11.6–15.1)
GFR SERPL CREATININE-BSD FRML MDRD: 59 ML/MIN/1.73SQ M
GLUCOSE SERPL-MCNC: 94 MG/DL (ref 65–140)
HCT VFR BLD AUTO: 41.8 % (ref 34.8–46.1)
HGB BLD-MCNC: 13.5 G/DL (ref 11.5–15.4)
IMM GRANULOCYTES # BLD AUTO: 0.03 THOUSAND/UL (ref 0–0.2)
IMM GRANULOCYTES NFR BLD AUTO: 0 % (ref 0–2)
KCT BLD-ACNC: 297 SEC (ref 89–137)
LYMPHOCYTES # BLD AUTO: 2.87 THOUSANDS/ΜL (ref 0.6–4.47)
LYMPHOCYTES NFR BLD AUTO: 41 % (ref 14–44)
MAGNESIUM SERPL-MCNC: 2.1 MG/DL (ref 1.6–2.6)
MCH RBC QN AUTO: 29.2 PG (ref 26.8–34.3)
MCHC RBC AUTO-ENTMCNC: 32.3 G/DL (ref 31.4–37.4)
MCV RBC AUTO: 90 FL (ref 82–98)
MONOCYTES # BLD AUTO: 0.57 THOUSAND/ΜL (ref 0.17–1.22)
MONOCYTES NFR BLD AUTO: 8 % (ref 4–12)
NEUTROPHILS # BLD AUTO: 3.23 THOUSANDS/ΜL (ref 1.85–7.62)
NEUTS SEG NFR BLD AUTO: 46 % (ref 43–75)
NRBC BLD AUTO-RTO: 0 /100 WBCS
PLATELET # BLD AUTO: 317 THOUSANDS/UL (ref 149–390)
PLATELET # BLD AUTO: 323 THOUSANDS/UL (ref 149–390)
PMV BLD AUTO: 8.6 FL (ref 8.9–12.7)
PMV BLD AUTO: 8.7 FL (ref 8.9–12.7)
POTASSIUM SERPL-SCNC: 4.1 MMOL/L (ref 3.5–5.3)
RBC # BLD AUTO: 4.63 MILLION/UL (ref 3.81–5.12)
SODIUM SERPL-SCNC: 143 MMOL/L (ref 136–145)
SPECIMEN SOURCE: ABNORMAL
TROPONIN I SERPL-MCNC: 0.11 NG/ML
TROPONIN I SERPL-MCNC: 0.46 NG/ML
TROPONIN I SERPL-MCNC: 0.54 NG/ML
WBC # BLD AUTO: 7.01 THOUSAND/UL (ref 4.31–10.16)

## 2019-01-18 PROCEDURE — 027034Z DILATION OF CORONARY ARTERY, ONE ARTERY WITH DRUG-ELUTING INTRALUMINAL DEVICE, PERCUTANEOUS APPROACH: ICD-10-PCS | Performed by: INTERNAL MEDICINE

## 2019-01-18 PROCEDURE — C9600 PERC DRUG-EL COR STENT SING: HCPCS | Performed by: PHYSICIAN ASSISTANT

## 2019-01-18 PROCEDURE — 4A023N7 MEASUREMENT OF CARDIAC SAMPLING AND PRESSURE, LEFT HEART, PERCUTANEOUS APPROACH: ICD-10-PCS | Performed by: INTERNAL MEDICINE

## 2019-01-18 PROCEDURE — 85730 THROMBOPLASTIN TIME PARTIAL: CPT | Performed by: INTERNAL MEDICINE

## 2019-01-18 PROCEDURE — 83735 ASSAY OF MAGNESIUM: CPT | Performed by: NURSE PRACTITIONER

## 2019-01-18 PROCEDURE — 85049 AUTOMATED PLATELET COUNT: CPT | Performed by: NURSE PRACTITIONER

## 2019-01-18 PROCEDURE — 93458 L HRT ARTERY/VENTRICLE ANGIO: CPT | Performed by: INTERNAL MEDICINE

## 2019-01-18 PROCEDURE — C1760 CLOSURE DEV, VASC: HCPCS | Performed by: PHYSICIAN ASSISTANT

## 2019-01-18 PROCEDURE — C1894 INTRO/SHEATH, NON-LASER: HCPCS | Performed by: PHYSICIAN ASSISTANT

## 2019-01-18 PROCEDURE — C1887 CATHETER, GUIDING: HCPCS | Performed by: PHYSICIAN ASSISTANT

## 2019-01-18 PROCEDURE — 99223 1ST HOSP IP/OBS HIGH 75: CPT | Performed by: NURSE PRACTITIONER

## 2019-01-18 PROCEDURE — C1769 GUIDE WIRE: HCPCS | Performed by: PHYSICIAN ASSISTANT

## 2019-01-18 PROCEDURE — 99152 MOD SED SAME PHYS/QHP 5/>YRS: CPT | Performed by: PHYSICIAN ASSISTANT

## 2019-01-18 PROCEDURE — 93005 ELECTROCARDIOGRAM TRACING: CPT

## 2019-01-18 PROCEDURE — 80048 BASIC METABOLIC PNL TOTAL CA: CPT | Performed by: NURSE PRACTITIONER

## 2019-01-18 PROCEDURE — 93306 TTE W/DOPPLER COMPLETE: CPT

## 2019-01-18 PROCEDURE — B2111ZZ FLUOROSCOPY OF MULTIPLE CORONARY ARTERIES USING LOW OSMOLAR CONTRAST: ICD-10-PCS | Performed by: INTERNAL MEDICINE

## 2019-01-18 PROCEDURE — 93306 TTE W/DOPPLER COMPLETE: CPT | Performed by: INTERNAL MEDICINE

## 2019-01-18 PROCEDURE — 85025 COMPLETE CBC W/AUTO DIFF WBC: CPT | Performed by: NURSE PRACTITIONER

## 2019-01-18 PROCEDURE — 92928 PRQ TCAT PLMT NTRAC ST 1 LES: CPT | Performed by: INTERNAL MEDICINE

## 2019-01-18 PROCEDURE — C1874 STENT, COATED/COV W/DEL SYS: HCPCS

## 2019-01-18 PROCEDURE — 99152 MOD SED SAME PHYS/QHP 5/>YRS: CPT | Performed by: INTERNAL MEDICINE

## 2019-01-18 PROCEDURE — 85347 COAGULATION TIME ACTIVATED: CPT

## 2019-01-18 PROCEDURE — 99222 1ST HOSP IP/OBS MODERATE 55: CPT | Performed by: INTERNAL MEDICINE

## 2019-01-18 PROCEDURE — 93458 L HRT ARTERY/VENTRICLE ANGIO: CPT | Performed by: PHYSICIAN ASSISTANT

## 2019-01-18 PROCEDURE — 99153 MOD SED SAME PHYS/QHP EA: CPT | Performed by: PHYSICIAN ASSISTANT

## 2019-01-18 PROCEDURE — 84484 ASSAY OF TROPONIN QUANT: CPT | Performed by: NURSE PRACTITIONER

## 2019-01-18 RX ORDER — NITROGLYCERIN 20 MG/100ML
INJECTION INTRAVENOUS CODE/TRAUMA/SEDATION MEDICATION
Status: COMPLETED | OUTPATIENT
Start: 2019-01-18 | End: 2019-01-18

## 2019-01-18 RX ORDER — SODIUM CHLORIDE 9 MG/ML
100 INJECTION, SOLUTION INTRAVENOUS ONCE
Status: DISCONTINUED | OUTPATIENT
Start: 2019-01-19 | End: 2019-01-18

## 2019-01-18 RX ORDER — ACETAMINOPHEN 325 MG/1
650 TABLET ORAL EVERY 6 HOURS PRN
Status: DISCONTINUED | OUTPATIENT
Start: 2019-01-18 | End: 2019-01-19 | Stop reason: HOSPADM

## 2019-01-18 RX ORDER — CLOPIDOGREL BISULFATE 75 MG/1
300 TABLET ORAL ONCE
Status: COMPLETED | OUTPATIENT
Start: 2019-01-18 | End: 2019-01-18

## 2019-01-18 RX ORDER — CLOPIDOGREL BISULFATE 75 MG/1
75 TABLET ORAL DAILY
Status: DISCONTINUED | OUTPATIENT
Start: 2019-01-18 | End: 2019-01-19 | Stop reason: HOSPADM

## 2019-01-18 RX ORDER — HEPARIN SODIUM 1000 [USP'U]/ML
INJECTION, SOLUTION INTRAVENOUS; SUBCUTANEOUS CODE/TRAUMA/SEDATION MEDICATION
Status: COMPLETED | OUTPATIENT
Start: 2019-01-18 | End: 2019-01-18

## 2019-01-18 RX ORDER — HEPARIN SODIUM 1000 [USP'U]/ML
4000 INJECTION, SOLUTION INTRAVENOUS; SUBCUTANEOUS AS NEEDED
Status: DISCONTINUED | OUTPATIENT
Start: 2019-01-18 | End: 2019-01-19 | Stop reason: ALTCHOICE

## 2019-01-18 RX ORDER — LIDOCAINE HYDROCHLORIDE 10 MG/ML
INJECTION, SOLUTION INFILTRATION; PERINEURAL CODE/TRAUMA/SEDATION MEDICATION
Status: COMPLETED | OUTPATIENT
Start: 2019-01-18 | End: 2019-01-18

## 2019-01-18 RX ORDER — HEPARIN SODIUM 1000 [USP'U]/ML
4000 INJECTION, SOLUTION INTRAVENOUS; SUBCUTANEOUS ONCE
Status: COMPLETED | OUTPATIENT
Start: 2019-01-18 | End: 2019-01-18

## 2019-01-18 RX ORDER — SODIUM CHLORIDE 9 MG/ML
100 INJECTION, SOLUTION INTRAVENOUS CONTINUOUS
Status: DISPENSED | OUTPATIENT
Start: 2019-01-18 | End: 2019-01-18

## 2019-01-18 RX ORDER — LIDOCAINE 50 MG/G
1 PATCH TOPICAL DAILY
Status: DISCONTINUED | OUTPATIENT
Start: 2019-01-18 | End: 2019-01-19 | Stop reason: HOSPADM

## 2019-01-18 RX ORDER — ONDANSETRON 2 MG/ML
4 INJECTION INTRAMUSCULAR; INTRAVENOUS EVERY 6 HOURS PRN
Status: DISCONTINUED | OUTPATIENT
Start: 2019-01-18 | End: 2019-01-19 | Stop reason: HOSPADM

## 2019-01-18 RX ORDER — FENTANYL CITRATE 50 UG/ML
INJECTION, SOLUTION INTRAMUSCULAR; INTRAVENOUS CODE/TRAUMA/SEDATION MEDICATION
Status: COMPLETED | OUTPATIENT
Start: 2019-01-18 | End: 2019-01-18

## 2019-01-18 RX ORDER — HEPARIN SODIUM 5000 [USP'U]/ML
5000 INJECTION, SOLUTION INTRAVENOUS; SUBCUTANEOUS EVERY 8 HOURS SCHEDULED
Status: DISCONTINUED | OUTPATIENT
Start: 2019-01-18 | End: 2019-01-18

## 2019-01-18 RX ORDER — SODIUM CHLORIDE 9 MG/ML
100 INJECTION, SOLUTION INTRAVENOUS ONCE
Status: COMPLETED | OUTPATIENT
Start: 2019-01-18 | End: 2019-01-18

## 2019-01-18 RX ORDER — HEPARIN SODIUM 10000 [USP'U]/100ML
3-20 INJECTION, SOLUTION INTRAVENOUS
Status: DISCONTINUED | OUTPATIENT
Start: 2019-01-18 | End: 2019-01-19 | Stop reason: ALTCHOICE

## 2019-01-18 RX ORDER — NITROGLYCERIN 0.4 MG/1
0.4 TABLET SUBLINGUAL
Status: DISCONTINUED | OUTPATIENT
Start: 2019-01-18 | End: 2019-01-19 | Stop reason: HOSPADM

## 2019-01-18 RX ORDER — ALPRAZOLAM 0.5 MG/1
1 TABLET ORAL DAILY PRN
Status: DISCONTINUED | OUTPATIENT
Start: 2019-01-18 | End: 2019-01-19 | Stop reason: HOSPADM

## 2019-01-18 RX ORDER — MELATONIN
1000 DAILY
Status: DISCONTINUED | OUTPATIENT
Start: 2019-01-18 | End: 2019-01-19 | Stop reason: HOSPADM

## 2019-01-18 RX ORDER — HEPARIN SODIUM 1000 [USP'U]/ML
2000 INJECTION, SOLUTION INTRAVENOUS; SUBCUTANEOUS AS NEEDED
Status: DISCONTINUED | OUTPATIENT
Start: 2019-01-18 | End: 2019-01-19 | Stop reason: ALTCHOICE

## 2019-01-18 RX ORDER — PRAVASTATIN SODIUM 10 MG
20 TABLET ORAL
Status: DISCONTINUED | OUTPATIENT
Start: 2019-01-18 | End: 2019-01-19 | Stop reason: HOSPADM

## 2019-01-18 RX ORDER — MIDAZOLAM HYDROCHLORIDE 1 MG/ML
INJECTION INTRAMUSCULAR; INTRAVENOUS CODE/TRAUMA/SEDATION MEDICATION
Status: COMPLETED | OUTPATIENT
Start: 2019-01-18 | End: 2019-01-18

## 2019-01-18 RX ORDER — CHLORAL HYDRATE 500 MG
2000 CAPSULE ORAL DAILY
Status: DISCONTINUED | OUTPATIENT
Start: 2019-01-18 | End: 2019-01-19 | Stop reason: HOSPADM

## 2019-01-18 RX ORDER — ASPIRIN 81 MG/1
81 TABLET, CHEWABLE ORAL DAILY
Status: DISCONTINUED | OUTPATIENT
Start: 2019-01-18 | End: 2019-01-19 | Stop reason: HOSPADM

## 2019-01-18 RX ADMIN — ACETAMINOPHEN 650 MG: 325 TABLET, FILM COATED ORAL at 17:16

## 2019-01-18 RX ADMIN — FENTANYL CITRATE 50 MCG: 50 INJECTION, SOLUTION INTRAMUSCULAR; INTRAVENOUS at 12:02

## 2019-01-18 RX ADMIN — BIMATOPROST 1 DROP: 0.1 SOLUTION/ DROPS OPHTHALMIC at 21:36

## 2019-01-18 RX ADMIN — MIDAZOLAM 2 MG: 1 INJECTION INTRAMUSCULAR; INTRAVENOUS at 12:02

## 2019-01-18 RX ADMIN — SODIUM CHLORIDE 100 ML/HR: 0.9 INJECTION, SOLUTION INTRAVENOUS at 11:06

## 2019-01-18 RX ADMIN — LIDOCAINE HYDROCHLORIDE 10 ML: 10 INJECTION, SOLUTION INFILTRATION; PERINEURAL at 12:07

## 2019-01-18 RX ADMIN — Medication 30 MG: at 09:17

## 2019-01-18 RX ADMIN — HEPARIN SODIUM AND DEXTROSE 12 UNITS/KG/HR: 10000; 5 INJECTION INTRAVENOUS at 09:42

## 2019-01-18 RX ADMIN — HEPARIN SODIUM 4000 UNITS: 1000 INJECTION INTRAVENOUS; SUBCUTANEOUS at 09:41

## 2019-01-18 RX ADMIN — CLOPIDOGREL BISULFATE 300 MG: 75 TABLET ORAL at 06:47

## 2019-01-18 RX ADMIN — Medication 2000 MG: at 09:17

## 2019-01-18 RX ADMIN — NITROGLYCERIN 200 MCG: 20 INJECTION INTRAVENOUS at 12:26

## 2019-01-18 RX ADMIN — METOPROLOL TARTRATE 12.5 MG: 25 TABLET ORAL at 21:36

## 2019-01-18 RX ADMIN — METOPROLOL TARTRATE 12.5 MG: 25 TABLET ORAL at 09:17

## 2019-01-18 RX ADMIN — NITROGLYCERIN 0.4 MG: 0.4 TABLET SUBLINGUAL at 18:17

## 2019-01-18 RX ADMIN — PRAVASTATIN SODIUM 20 MG: 10 TABLET ORAL at 17:16

## 2019-01-18 RX ADMIN — VITAMIN D, TAB 1000IU (100/BT) 1000 UNITS: 25 TAB at 09:17

## 2019-01-18 RX ADMIN — IOHEXOL 100 ML: 350 INJECTION, SOLUTION INTRAVENOUS at 12:31

## 2019-01-18 RX ADMIN — HEPARIN SODIUM 5000 UNITS: 5000 INJECTION INTRAVENOUS; SUBCUTANEOUS at 05:15

## 2019-01-18 RX ADMIN — HEPARIN SODIUM 8000 UNITS: 1000 INJECTION INTRAVENOUS; SUBCUTANEOUS at 12:17

## 2019-01-18 RX ADMIN — SODIUM CHLORIDE 100 ML/HR: 0.9 INJECTION, SOLUTION INTRAVENOUS at 13:00

## 2019-01-18 NOTE — ASSESSMENT & PLAN NOTE
· Presenting complaint of right arm pain   Patient has a high heart score due to history of STEMI, admitted as ACS rule out  · BIA 4  · Right arm pain reproducible on palpation   · Topical lidocaine TD patch

## 2019-01-18 NOTE — H&P
H&P- Caitlyn Patel 1945, 74 y o  female MRN: [id number]    Unit/Bed#: E4 -01 Encounter: [id number]    Primary Care Provider: Lory Mon DO   Date and time admitted to hospital: 1/17/2019  8:42 PM      * NSTEMI (non-ST elevated myocardial infarction) [facility])   Assessment & Plan    · Reported to ER with c/o severe right arm pain, has a history of MI last year, she became concerned that this could be another MI  · Troponin negative in ED, 2nd troponin showed elevation of 0 11  · Continue to monitor troponins  · Serial EKGs  · Given aspirin bolus in ED  · Off statin due to myalgia  · Repeat echo  · Cardiology consult  · Monitor on telemetry     Elevated blood pressure reading   Assessment & Plan    · Metoprolol decreased by cardiologist due to "fatigue which may be related to low-dose beta-blocker"  · Continue metoprolol 12 5 mg bid     History of MI (myocardial infarction)   Assessment & Plan    · Acute inferior STEMI Jan 2018, coronary angiography showed 100% occlusion of the mLCx and also of the prox RCA; severe multifocal stenosis of the LAD s/p PCI with GLENNA placement of the LCx and RCA   · Discharged on DAPT, ASA, Plavix, Lopressor and statin     Right arm pain   Assessment & Plan    · Presenting complaint of right arm pain  Patient has a high heart score due to history of STEMI, admitted as ACS rule out  · BIA 4  · Right arm pain reproducible on palpation   · Topical lidocaine TD patch     CAD (coronary artery disease), native coronary artery   Assessment & Plan    · CAD s/p GLENNA x2 in Jan 2018         VTE Prophylaxis: Heparin  / sequential compression device   Code Status: FC  POLST: POLST is not applicable to this patient  Discussion with family:     Anticipated Length of Stay:  Patient will be admitted on an Observation basis with an anticipated length of stay of  < 2 midnights     Justification for Hospital Stay:  Right arm pain rule out ACS    Total Time for Visit, including Counseling / Coordination of Care: 45 minutes  Greater than 50% of this total time spent on direct patient counseling and coordination of care  Chief Complaint:   Right arm pain    History of Present Illness:    Ihsan Shane is a 74 y o  female who presents with c/o right arm pain  Patient has history of STEMI 2018, became concerned so reported to ED  States she was sitting down listening to an audio book when she developed severe right arm pain radiating down her arm to her hand, denies chest pain, associated shortness of breath/nausea/diaphoresis, no aggravating or relieving factor identified, reproducible RUE pain on palpation  Denies any other symptoms, no recent illness, fever, cough, chills, palpitations, SOB, abdominal pain or NVCD  Review of Systems:    Review of Systems   Constitutional: Negative  HENT: Negative  Respiratory: Negative  Cardiovascular: Negative  Gastrointestinal: Negative  Genitourinary: Negative  Musculoskeletal: Negative  Right arm pain   Neurological: Negative  Psychiatric/Behavioral: Negative  Past Medical and Surgical History:     Past Medical History:   Diagnosis Date    Coronary artery disease     Glaucoma        Past Surgical History:   Procedure Laterality Date     SECTION      CORONARY ANGIOPLASTY WITH STENT PLACEMENT         Meds/Allergies:    Prior to Admission medications    Medication Sig Start Date End Date Taking?  Authorizing Provider   bimatoprost (LUMIGAN) 0 01 % ophthalmic drops Administer 1 drop to both eyes daily at bedtime   Yes Historical Provider, MD   cholecalciferol (VITAMIN D3) 1,000 units tablet Take by mouth daily   Yes Historical Provider, MD   co-enzyme Q-10 30 mg Take 30 mg by mouth   Yes Historical Provider, MD   conjugated estrogens (PREMARIN) vaginal cream Insert into the vagina 3/31/15  Yes Historical Provider, MD   Magnesium 500 MG CAPS Take by mouth   Yes Historical Provider, MD   multivitamin (THERAGRAN) TABS Take 1 tablet by mouth   Yes Historical Provider, MD   Omega-3 Fatty Acids (FISH OIL PO) Take 2 g by mouth   Yes Historical Provider, MD   ALPRAZolam Kacie Dinero) 1 mg tablet Take by mouth 6/16/14   Historical Provider, MD     I have reviewed home medications with patient personally  Allergies: No Known Allergies    Social History:     Marital Status: /Civil Union   Occupation: Retired  Patient Pre-hospital Living Situation: resides at home w spouse  Patient Pre-hospital Level of Mobility: ambulatory  Patient Pre-hospital Diet Restrictions:  vegetarian  Substance Use History:   History   Alcohol Use    Yes     Comment: Social     History   Smoking Status    Former Smoker   Smokeless Tobacco    Never Used     History   Drug Use No       Family History:    History reviewed  No pertinent family history  Physical Exam:     Vitals:   Blood Pressure: 159/92 (01/17/19 2340)  Pulse: 70 (01/17/19 2340)  Temperature: 97 6 °F (36 4 °C) (01/17/19 2340)  Temp Source: Tympanic (01/17/19 2340)  Respirations: 18 (01/17/19 2340)  Height: 5' 2" (157 5 cm) (01/17/19 2340)  Weight - Scale: 70 7 kg (155 lb 13 8 oz) (01/17/19 2340)  SpO2: 97 % (01/17/19 2340)    Physical Exam   Constitutional: She is oriented to person, place, and time  She appears well-developed and well-nourished  No distress  HENT:   Head: Normocephalic and atraumatic  Eyes: Conjunctivae and EOM are normal    Neck: Neck supple  No JVD present  Cardiovascular: Normal rate, regular rhythm, normal heart sounds and intact distal pulses  No murmur heard  Pulmonary/Chest: Effort normal and breath sounds normal  No respiratory distress  She has no wheezes  She has no rales  She exhibits no tenderness  Abdominal: Soft  Bowel sounds are normal  She exhibits no distension and no mass  There is no tenderness  There is no rebound and no guarding  Musculoskeletal: She exhibits tenderness  She exhibits no edema     Tenderness on palpation of right upper arm   Neurological: She is alert and oriented to person, place, and time  Skin: Skin is warm and dry  No rash noted  She is not diaphoretic  No erythema  No pallor  Psychiatric: She has a normal mood and affect  Her behavior is normal  Judgment and thought content normal      Additional Data:     Lab Results: I have personally reviewed pertinent reports  Results from last 7 days  Lab Units 01/17/19  2141   WBC Thousand/uL 6 35   HEMOGLOBIN g/dL 13 7   HEMATOCRIT % 43 5   PLATELETS Thousands/uL 327   NEUTROS PCT % 45   LYMPHS PCT % 44   MONOS PCT % 7   EOS PCT % 3       Results from last 7 days  Lab Units 01/17/19  2141   SODIUM mmol/L 145   POTASSIUM mmol/L 4 0   CHLORIDE mmol/L 106   CO2 mmol/L 29   BUN mg/dL 24   CREATININE mg/dL 0 98   ANION GAP mmol/L 10   CALCIUM mg/dL 9 7   ALBUMIN g/dL 3 7   TOTAL BILIRUBIN mg/dL 0 15*   ALK PHOS U/L 71   ALT U/L 31   AST U/L 18   GLUCOSE RANDOM mg/dL 106       Results from last 7 days  Lab Units 01/17/19  2141   INR  0 92                   Imaging: I have personally reviewed pertinent reports  XR chest 2 views   ED Interpretation by Florida Shepherd DO (01/17 2158)   No acute disease  Lungs are clear  No infiltrates  Mediastinum is not widened  No air under the diaphragm  Osseous structures appear intact without fracture or dislocation  EKG, Pathology, and Other Studies Reviewed on Admission:   · EKG     Allscripts / Epic Records Reviewed: Yes     ** Please Note: This note has been constructed using a voice recognition system   **

## 2019-01-18 NOTE — ASSESSMENT & PLAN NOTE
· Reported to ER with c/o severe right arm pain, has a history of MI last year, she became concerned that this could be another MI  · Troponin negative in ED, 2nd troponin showed elevation of 0 11  · Continue to monitor troponins  · Serial EKGs  · Given aspirin bolus in ED  · Off statin due to myalgia  · Repeat echo  · Cardiology consult  · Monitor on telemetry

## 2019-01-18 NOTE — ASSESSMENT & PLAN NOTE
· Acute inferior STEMI Jan 2018, coronary angiography showed 100% occlusion of the mLCx and also of the prox RCA; severe multifocal stenosis of the LAD s/p PCI with GLENNA placement of the LCx and RCA   · Discharged on DAPT, ASA, Plavix, Lopressor and statin

## 2019-01-18 NOTE — CONSULTS
Consult - Cardiology   Jad Hankins 74 y o  female MRN: [id number]  Unit/Bed#: E4 -01 Encounter: [id number]        Reason For Consult:  Chest pain               Assessment and Plan:     1  Non STEMI:  Probably type 1   -Catheterization advised for definitive evaluation - patient agreeable   -Extended conversation with the patient regarding her prior abandonment of prescribed medications, their mechanism of action, indication, expected duration of use and risks associated with stoppage ~~> all her questions were answered and she is agreeable to use (specifically dual antiplatelet therapy and re-initiation of statin)  -Patient has received full-dose aspirin and loading dose of Plavix with ongoing heparin    -Will reinitiate statin~~> although less effective will use Pravachol hoping to promote compliance as she had previously had myalgias from Lipitor  Would favor conversion to rosuvastatin in the outpatient setting  2  Dyslipidemia:   -On no pre-hospital Rx as discussed above in #1    -Check lipid profile to assess current status   3  CAD, inferior wall STEMI-1/2018, LAD PCI(unknown date), PCI/GLENNA-LAD/CFX/RCA-1/2018  4  Hypertension:  On no Rx prior to admission (self stoppage)   -will reinitiate beta-blocker and follow trend      History Of Present Illness:  Ms Ayden Tariq is a 13-year-old female typically cared for by Dr Prasanna Mart of our group  One year ago, in the setting of inferior wall STEMI, the patient underwent Cardiac catheterization on 1/29/2018 noting three-vessel CAD  PCI/stenting was performed on lesions in the mid circumflex as well as proximal and mid RCA (culprit lesion)  The following day she returned to the catheterization lab for PCI/GLENNA across a long midvessel lesion in the LAD  Echocardiogram at that time showed normal EF with hypokinesia of the basal-mid inferior and basal-mid inferolateral walls with mild-moderate MR  She later completed cardiac rehab    Office notes reflected the patient had been poorly tolerant of Lipitor (myalgias)  Because of this she was placed on pravastatin 10 mg when seen by Dr Markus Alcantara in May 2018 (her last visit)  At that time the patient had controlled blood pressure and was otherwise doing well  Yesterday the patient came to the emergency department reporting the acute onset of arm and chest pain  During the last week the patient has had some intermittent feelings of left arm pain which she described as an achiness near the deltoid  This was not associated with chest pain per se  Her symptoms were for the most part self-limiting and somewhat variable occurring both with rest and effort  Yesterday while at rest the patient had a sensation of pain in the right deltoid which was stronger than prior events  She denied other associated symptoms at the time of onset  She promptly came to the hospital achieving amelioration with aspirin and Tylenol  ECG did not show any acute ischemic change  She later had some recurrent pain then described as a midsternal pressure  Since arrival her troponin levels have been escalating - 0 02, 0 11, 0 46  Of note, the patient tells me she is currently taking no prescription medications  In fact, she may not have been taking dual antiplatelet therapy when seen by Dr Markus Alcantara in May (if she were it was stopped by summer)  Additionally it sounds unlikely that she had ever filled the prescription for Pravachol that he had prescribed  Past Medical History:        Past Medical History:   Diagnosis Date    Coronary artery disease     Glaucoma     Past Surgical History:   Procedure Laterality Date     SECTION      CORONARY ANGIOPLASTY WITH STENT PLACEMENT          Allergy:        No Known Allergies    Medications:       Prior to Admission medications    Medication Sig Start Date End Date Taking?  Authorizing Provider   bimatoprost (LUMIGAN) 0 01 % ophthalmic drops Administer 1 drop to both eyes daily at bedtime Yes Historical Provider, MD   cholecalciferol (VITAMIN D3) 1,000 units tablet Take by mouth daily   Yes Historical Provider, MD   co-enzyme Q-10 30 mg Take 30 mg by mouth   Yes Historical Provider, MD   conjugated estrogens (PREMARIN) vaginal cream Insert into the vagina 3/31/15  Yes Historical Provider, MD   Magnesium 500 MG CAPS Take by mouth   Yes Historical Provider, MD   multivitamin (THERAGRAN) TABS Take 1 tablet by mouth   Yes Historical Provider, MD   Omega-3 Fatty Acids (FISH OIL PO) Take 2 g by mouth   Yes Historical Provider, MD Isidra Maradiaga) 1 mg tablet Take by mouth 6/16/14   Historical Provider, MD       Family History:     History reviewed  No pertinent family history  Social History:       Social History     Social History    Marital status: /Civil Union     Spouse name: N/A    Number of children: N/A    Years of education: N/A     Social History Main Topics    Smoking status: Former Smoker    Smokeless tobacco: Never Used    Alcohol use Yes      Comment: Social    Drug use: No    Sexual activity: Not Asked     Other Topics Concern    None     Social History Narrative    None       ROS:  Symptoms per HPI  Decreased visual acuity-not acute  Remainder review of systems is negative    Exam:  General:  Alert, normally conversant, comfortable appearing  Head: Normocephalic, atraumatic  Eyes:  EOMI  Pupils - equal, round, reactive to accomodation  No icterus  Normal Conjunctiva  Oropharynx:  Moist without lesion  Neck:  No gross bruit, JVD, thyromegaly, or lymphadenopathy  Heart:  Regular with controlled rate  No rub nor pathologic murmur  Lungs:  Clear without rales/rhonchi/wheeze  Abdomen:  Soft and nontender with normal bowel sounds   No organomegaly or mass  Lower Limbs:  No edema  Pulses[de-identified]  RLE - DP:  2+                 LLE - DP:  2+  Musculoskeletal: Independent movement of limbs observed, Formal ROM and strength eval not performed  Neurologic:    Oriented to: person , place, situation  Cranial Nerves: grossly intact - vision, smell, taste, and hearing  were not tested       Motor function:grossly normal, symmetric   Sensation: Was not tested

## 2019-01-18 NOTE — ASSESSMENT & PLAN NOTE
· Metoprolol decreased by cardiologist due to "fatigue which may be related to low-dose beta-blocker"  · Continue metoprolol 12 5 mg bid

## 2019-01-18 NOTE — PROGRESS NOTES
Pt had episode of 3/10 R sided arm pain; pt wanted to try nitro rather than morphine; pain was resolved after first dose; pt resting in bed with no other concerns at this time; will continue to monitor

## 2019-01-18 NOTE — ED PROVIDER NOTES
History  Chief Complaint   Patient presents with    Arm Pain     Pt states "pain in my left arm going all the way down" Pt denies CP  denies injury     Patient is a 57-year-old female with a history of coronary artery disease and 5 stents that presents for right-sided arm pain that started while she was listening to a book in bed  Patient denies any chest pain or shortness of breath  Pain has been persistent  This is different from her prior heart attack last year  Given her history she became concerned and came in for evaluation  No treatment prior to arrival   Patient states that she has not been taken her medications that have been recommended to her by Cardiology  This includes her anticoagulant and cholesterol medications  History provided by:  Patient and spouse   used: No    Arm Pain   Location:  Right arm  Quality:  Achy and numb  Severity:  Moderate  Onset quality:  Gradual  Duration:  1 hour  Timing:  Constant  Progression:  Unchanged  Chronicity:  New  Associated symptoms: no abdominal pain, no chest pain, no cough, no diarrhea, no fever, no nausea, no rash, no shortness of breath and no vomiting        Prior to Admission Medications   Prescriptions Last Dose Informant Patient Reported? Taking?    ALPRAZolam (XANAX) 1 mg tablet   Yes Yes   Sig: Take by mouth   Magnesium 500 MG CAPS  Self Yes Yes   Sig: Take by mouth   Omega-3 Fatty Acids (FISH OIL PO)   Yes Yes   Sig: Take 2 g by mouth   bimatoprost (LUMIGAN) 0 01 % ophthalmic drops   Yes Yes   Sig: Administer 1 drop to both eyes daily at bedtime   cholecalciferol (VITAMIN D3) 1,000 units tablet  Self Yes Yes   Sig: Take by mouth daily   co-enzyme Q-10 30 mg   Yes Yes   Sig: Take 30 mg by mouth   conjugated estrogens (PREMARIN) vaginal cream   Yes Yes   Sig: Insert into the vagina   multivitamin (THERAGRAN) TABS   Yes Yes   Sig: Take 1 tablet by mouth      Facility-Administered Medications: None       Past Medical History: Diagnosis Date    Glaucoma        Past Surgical History:   Procedure Laterality Date     SECTION      CORONARY ANGIOPLASTY WITH STENT PLACEMENT         History reviewed  No pertinent family history  I have reviewed and agree with the history as documented  Social History   Substance Use Topics    Smoking status: Former Smoker    Smokeless tobacco: Never Used    Alcohol use No        Review of Systems   Constitutional: Negative for chills, diaphoresis and fever  Respiratory: Negative for cough, chest tightness and shortness of breath  Cardiovascular: Negative for chest pain, palpitations and leg swelling  Gastrointestinal: Negative for abdominal pain, diarrhea, nausea and vomiting  Skin: Negative for rash  Allergic/Immunologic: Negative for immunocompromised state  Neurological: Positive for numbness  Negative for weakness  All other systems reviewed and are negative  Physical Exam  Physical Exam   Constitutional: She is oriented to person, place, and time  She appears well-developed and well-nourished  No distress  HENT:   Head: Normocephalic and atraumatic  Eyes: Conjunctivae are normal  No scleral icterus  Neck: Normal range of motion  Neck supple  Cardiovascular: Normal rate, regular rhythm, normal heart sounds and intact distal pulses  Exam reveals no friction rub  No murmur heard  Pulmonary/Chest: Effort normal and breath sounds normal  No stridor  No respiratory distress  She has no wheezes  She has no rales  Abdominal: Soft  She exhibits no distension  There is no tenderness  There is no rebound and no guarding  Musculoskeletal: She exhibits no edema or deformity  Right shoulder: She exhibits decreased range of motion, tenderness, bony tenderness and pain  She exhibits no swelling, no effusion, no crepitus, no deformity, no laceration and normal pulse  Arms:  Neurological: She is alert and oriented to person, place, and time     Skin: Skin is warm and dry  She is not diaphoretic  Psychiatric: She has a normal mood and affect  Nursing note and vitals reviewed  Vital Signs  ED Triage Vitals   Temperature Pulse Respirations Blood Pressure SpO2   01/17/19 2041 01/17/19 2041 01/17/19 2041 01/17/19 2041 01/17/19 2041   97 9 °F (36 6 °C) 74 18 161/77 100 %      Temp src Heart Rate Source Patient Position - Orthostatic VS BP Location FiO2 (%)   -- 01/17/19 2143 -- 01/17/19 2143 --    Monitor  Left arm       Pain Score       01/17/19 2041       6           Vitals:    01/17/19 2041 01/17/19 2143   BP: 161/77 (!) 181/84   Pulse: 74 71       Visual Acuity      ED Medications  Medications   aspirin chewable tablet 324 mg (324 mg Oral Given 1/17/19 2134)   acetaminophen (TYLENOL) tablet 650 mg (650 mg Oral Given 1/17/19 2134)       Diagnostic Studies  Results Reviewed     Procedure Component Value Units Date/Time    Comprehensive metabolic panel [[id number]]  (Abnormal) Collected:  01/17/19 2141    Lab Status:  Final result Specimen:  Blood from Arm, Left Updated:  01/17/19 2233     Sodium 145 mmol/L      Potassium 4 0 mmol/L      Chloride 106 mmol/L      CO2 29 mmol/L      ANION GAP 10 mmol/L      BUN 24 mg/dL      Creatinine 0 98 mg/dL      Glucose 106 mg/dL      Calcium 9 7 mg/dL      AST 18 U/L      ALT 31 U/L      Alkaline Phosphatase 71 U/L      Total Protein 6 9 g/dL      Albumin 3 7 g/dL      Total Bilirubin 0 15 (L) mg/dL      eGFR 57 ml/min/1 73sq m     Narrative:         National Kidney Disease Education Program recommendations are as follows:  GFR calculation is accurate only with a steady state creatinine  Chronic Kidney disease less than 60 ml/min/1 73 sq  meters  Kidney failure less than 15 ml/min/1 73 sq  meters      Magnesium [[id number]]  (Abnormal) Collected:  01/17/19 2141    Lab Status:  Final result Specimen:  Blood from Arm, Left Updated:  01/17/19 2233     Magnesium 2 8 (H) mg/dL     Troponin I [[id number]]  (Normal) Collected: 01/17/19 2141    Lab Status:  Final result Specimen:  Blood from Arm, Left Updated:  01/17/19 2215     Troponin I 0 02 ng/mL     Protime-INR [[id number]]  (Normal) Collected:  01/17/19 2141    Lab Status:  Final result Specimen:  Blood from Arm, Left Updated:  01/17/19 2204     Protime 12 5 seconds      INR 0 92    APTT [[id number]]  (Normal) Collected:  01/17/19 2141    Lab Status:  Final result Specimen:  Blood from Arm, Left Updated:  01/17/19 2204     PTT 28 seconds     CBC and differential [[id number]]  (Abnormal) Collected:  01/17/19 2141    Lab Status:  Final result Specimen:  Blood from Arm, Left Updated:  01/17/19 2149     WBC 6 35 Thousand/uL      RBC 4 80 Million/uL      Hemoglobin 13 7 g/dL      Hematocrit 43 5 %      MCV 91 fL      MCH 28 5 pg      MCHC 31 5 g/dL      RDW 13 1 %      MPV 8 6 (L) fL      Platelets 052 Thousands/uL      nRBC 0 /100 WBCs      Neutrophils Relative 45 %      Immat GRANS % 0 %      Lymphocytes Relative 44 %      Monocytes Relative 7 %      Eosinophils Relative 3 %      Basophils Relative 1 %      Neutrophils Absolute 2 84 Thousands/µL      Immature Grans Absolute 0 01 Thousand/uL      Lymphocytes Absolute 2 79 Thousands/µL      Monocytes Absolute 0 45 Thousand/µL      Eosinophils Absolute 0 21 Thousand/µL      Basophils Absolute 0 05 Thousands/µL                  XR chest 2 views   ED Interpretation by Willard Hirsch DO (01/17 2158)   No acute disease  Lungs are clear  No infiltrates  Mediastinum is not widened  No air under the diaphragm  Osseous structures appear intact without fracture or dislocation                   Procedures  ECG 12 Lead Documentation  Date/Time: 1/17/2019 9:14 PM  Performed by: Armando Lr  Authorized by: Armando Lr     Indications / Diagnosis:  Chest pain  Patient location:  ED  Previous ECG:     Previous ECG:  Compared to current    Similarity:  No change  Interpretation:     Interpretation: normal    Rate:     ECG rate:  71    ECG rate assessment: normal    Rhythm:     Rhythm: sinus rhythm    Ectopy:     Ectopy: none    QRS:     QRS axis:  Normal    QRS intervals:  Normal  Conduction:     Conduction: normal    ST segments:     ST segments:  Normal  T waves:     T waves: normal             Phone Contacts  ED Phone Contact    ED Course         HEART Risk Score      Most Recent Value   History  0 Filed at: 01/17/2019 2121   ECG  0 Filed at: 01/17/2019 2121   Age  2 Filed at: 01/17/2019 2121   Risk Factors  2 Filed at: 01/17/2019 2121   Troponin  0 Filed at: 01/17/2019 2121   Heart Score Risk Calculator   History  0 Filed at: 01/17/2019 2121   ECG  0 Filed at: 01/17/2019 2121   Age  2 Filed at: 01/17/2019 2121   Risk Factors  2 Filed at: 01/17/2019 2121   Troponin  0 Filed at: 01/17/2019 2121   HEART Score  4 Filed at: 01/17/2019 2121   HEART Score  4 Filed at: 01/17/2019 2121                            MDM  Number of Diagnoses or Management Options  Arm pain, anterior, right: new and requires workup  Coronary artery disease involving native coronary artery of native heart without angina pectoris: new and requires workup  Diagnosis management comments: Patient is a 75-year-old female with a history of CAD that presents for right-sided arm pain  EKG does not show ST elevation  Does have a history of an MI with 5 stents  She is noncompliant with her anticoagulation and cholesterol medications  Does follow up with Cardiology on a regular basis  Denies any chest pain or shortness of breath tonight  Given her risk factors I will start with a cardiac workup  Her heart score is elevated  I feel observation is warranted given her complaint and history         Amount and/or Complexity of Data Reviewed  Clinical lab tests: ordered and reviewed  Tests in the radiology section of CPT®: ordered and reviewed  Tests in the medicine section of CPT®: ordered and reviewed  Review and summarize past medical records: yes  Independent visualization of images, tracings, or specimens: yes    Patient Progress  Patient progress: stable    CritCare Time    Disposition  Final diagnoses:   Arm pain, anterior, right   Coronary artery disease involving native coronary artery of native heart without angina pectoris     Time reflects when diagnosis was documented in both MDM as applicable and the Disposition within this note     Time User Action Codes Description Comment    1/17/2019 10:03 PM Rojelio Handing Add [M79 601] Arm pain, anterior, right     1/17/2019 10:03 PM Ozark Handing Add [I25 10] Coronary artery disease involving native coronary artery of native heart without angina pectoris       ED Disposition     ED Disposition Condition Comment    Admit  Case was discussed with [facility] and the patient's admission status was agreed to be Admission Status: observation status to the service of Dr Adelia Ellis   Follow-up Information    None         Patient's Medications   Discharge Prescriptions    No medications on file     No discharge procedures on file      ED Provider  Electronically Signed by           Michael Davenport DO  01/17/19 [id number]

## 2019-01-18 NOTE — PLAN OF CARE
CARDIOVASCULAR - ADULT     Maintains optimal cardiac output and hemodynamic stability Progressing     Absence of cardiac dysrhythmias or at baseline rhythm Progressing        DISCHARGE PLANNING     Discharge to home or other facility with appropriate resources Progressing        HEMATOLOGIC - ADULT     Maintains hematologic stability Progressing        INFECTION - ADULT     Absence or prevention of progression during hospitalization Progressing     Absence of fever/infection during neutropenic period Progressing        Knowledge Deficit     Patient/family/caregiver demonstrates understanding of disease process, treatment plan, medications, and discharge instructions Progressing        METABOLIC, FLUID AND ELECTROLYTES - ADULT     Electrolytes maintained within normal limits Progressing     Fluid balance maintained Progressing        PAIN - ADULT     Verbalizes/displays adequate comfort level or baseline comfort level Progressing        SAFETY ADULT     Patient will remain free of falls Progressing     Maintain or return to baseline ADL function Progressing     Maintain or return mobility status to optimal level Progressing

## 2019-01-18 NOTE — UTILIZATION REVIEW
Initial Clinical Review    Admission: Date/Time/Statement: 1/18/19 @ 0240 Inpatient Written   Orders Placed This Encounter   Procedures    Inpatient Admission     Standing Status:   Standing     Number of Occurrences:   1     Order Specific Question:   Admitting Physician     Answer:   Haley Young [[id number]]     Order Specific Question:   Level of Care     Answer:   Med Surg [16]     Order Specific Question:   Estimated length of stay     Answer:   More than 2 Midnights     Order Specific Question:   Certification     Answer:   I certify that inpatient services are medically necessary for this patient for a duration of greater than two midnights  See H&P and MD Progress Notes for additional information about the patient's course of treatment  ED: Date/Time/Mode of Arrival:   ED Arrival Information     Expected Arrival Acuity Means of Arrival Escorted By Service Admission Type    - 1/17/2019 20:35 Urgent Walk-In Self General Medicine Urgent    Arrival Complaint    Numbness        Chief Complaint:   Chief Complaint   Patient presents with    Arm Pain     Pt states "pain in my left arm going all the way down" Pt denies CP  denies injury     History of Illness: Patient is a 52-year-old female with a history of coronary artery disease and 5 stents that presents for right-sided arm pain that started while she was listening to a book in bed  Patient denies any chest pain or shortness of breath  Pain has been persistent  This is different from her prior heart attack last year  Given her history she became concerned and came in for evaluation  No treatment prior to arrival   Patient states that she has not been taken her medications that have been recommended to her by Cardiology  This includes her anticoagulant and cholesterol medications    ED Vital Signs:   ED Triage Vitals   Temperature Pulse Respirations Blood Pressure SpO2   01/17/19 2041 01/17/19 2041 01/17/19 2041 01/17/19 2041 01/17/19 2041   97 9 °F (36 6 °C) 74 18 161/77 100 %      Temp Source Heart Rate Source Patient Position - Orthostatic VS BP Location FiO2 (%)   01/17/19 2340 01/17/19 2143 01/17/19 2340 01/17/19 2143 --   Tympanic Monitor Lying Left arm       Pain Score       01/17/19 2041       6        Wt Readings from Last 1 Encounters:   01/17/19 70 7 kg (155 lb 13 8 oz)     Vital Signs (abnormal): /84  Pertinent Labs/Diagnostic Test Results: TOTAL BILI 0 15, MAG 2 8  Component      Latest Ref Rng & Units 1/17/2019 1/18/2019 1/18/2019 1/18/2019            1:02 AM  5:11 AM  9:01 AM   Troponin I      <=0 04 ng/mL 0 02 0 11 (H) 0 46 (H) 0 54 (H)     EKG:  SR  CXR:  NAD  ED Treatment:   Medication Administration from 01/17/2019 2035 to 01/17/2019 2336       Date/Time Order Dose Route Action     01/17/2019 2134 aspirin chewable tablet 324 mg 324 mg Oral Given     01/17/2019 2134 acetaminophen (TYLENOL) tablet 650 mg 650 mg Oral Given        Past Medical/Surgical History:    Active Ambulatory Problems     Diagnosis Date Noted    Acute ST elevation myocardial infarction (STEMI) of inferior wall ([facility] ) 01/30/2018    Borderline hypertension 01/30/2018    Dyslipidemia 01/30/2018    CAD (coronary artery disease), native coronary artery 01/31/2018     Resolved Ambulatory Problems     Diagnosis Date Noted    No Resolved Ambulatory Problems     Past Medical History:   Diagnosis Date    Coronary artery disease     Glaucoma      Admitting Diagnosis: Numbness [R20 0]  Arm pain, anterior, right [M79 601]  Coronary artery disease involving native coronary artery of native heart without angina pectoris [I25 10]  Age/Sex: 74 y o  female  Assessment/Plan: ADMIT INPATIENT STATUS  * NSTEMI (non-ST elevated myocardial infarction) ([facility] [address] )   Assessment & Plan     · Reported to ER with c/o severe right arm pain, has a history of MI last year, she became concerned that this could be another MI  · Troponin negative in ED, 2nd troponin showed elevation of 0 11  · Continue to monitor troponins  · Serial EKGs  · Given aspirin bolus in ED  · Off statin due to myalgia  · Repeat echo  · Cardiology consult  · Monitor on telemetry      Elevated blood pressure reading   Assessment & Plan     · Metoprolol decreased by cardiologist due to "fatigue which may be related to low-dose beta-blocker"  · Continue metoprolol 12 5 mg bid   History of MI (myocardial infarction)   Assessment & Plan     · Acute inferior STEMI Jan 2018, coronary angiography showed 100% occlusion of the mLCx and also of the prox RCA; severe multifocal stenosis of the LAD s/p PCI with GLENNA placement of the LCx and RCA   · Discharged on DAPT, ASA, Plavix, Lopressor and statin   Right arm pain   Assessment & Plan     · Presenting complaint of right arm pain  Patient has a high heart score due to history of STEMI, admitted as ACS rule out  · BIA 4  · Right arm pain reproducible on palpation   · Topical lidocaine TD patch   CAD (coronary artery disease), native coronary artery   Assessment & Plan     · CAD s/p GLENNA x2 in Jan 2018   VTE Prophylaxis: Heparin  / sequential compression device   Anticipated Length of Stay:  Patient will be admitted on an Observation basis with an anticipated length of stay of  < 2 midnights     Justification for Hospital Stay:  Right arm pain rule out ACS    Admission Orders:  Telemetry, trop q3h  NPO  Cardiac cath  EKG with 2nd trop  AMI education  Echo  Sequential compression device   Consult cardiology  Scheduled Meds:   Current Facility-Administered Medications:  acetaminophen 650 mg Oral Q6H PRN   ALPRAZolam 1 mg Oral Daily PRN   aspirin 81 mg Oral Daily   bimatoprost 1 drop Both Eyes HS   cholecalciferol 1,000 Units Oral Daily   clopidogrel 75 mg Oral Daily   co-enzyme Q-10 30 mg Oral Daily   fish oil 2,000 mg Oral Daily   heparin (porcine) 3-20 Units/kg/hr (Order-Specific) Intravenous Titrated   heparin (porcine) 2,000 Units Intravenous PRN   heparin (porcine) 4,000 Units Intravenous PRN lidocaine 1 patch Topical Daily   metoprolol tartrate 12 5 mg Oral Q12H [address]   morphine injection 1 mg Intravenous Q4H PRN   nitroglycerin 0 4 mg Sublingual Q5 Min PRN   ondansetron 4 mg Intravenous Q6H PRN   pravastatin 20 mg Oral Daily With Dinner     Continuous Infusions:   heparin (porcine) 3-20 Units/kg/hr (Order-Specific) Last Rate: 12 Units/kg/hr (01/18/19 7689)     PRN Meds:   acetaminophen    ALPRAZolam    heparin (porcine)    heparin (porcine)    morphine injection    nitroglycerin    ondansetron    [address] [facility] Review Department  Phone: [phone]; Fax [phone]  [email or web address]  ATTENTION: Please call with any questions or concerns to [phone]  and carefully listen to the prompts so that you are directed to the right person  Send all requests for admission clinical reviews, approved or denied determinations and any other requests to fax [phone]   All voicemails are confidential

## 2019-01-18 NOTE — PROGRESS NOTES
Cardiac cath performed via the RFA  Closed with angioseal at 12:30 am    80 % focal mid LAD stenosis just prior to the previously placed stent  Successful PCI with placement of a 3 0 x 15 mm GLENNA with an excellent angiographic result  The previously placed stent in the LAD, L Cx, and RCA are patent  She needs to take her medications

## 2019-01-19 VITALS
HEART RATE: 61 BPM | HEIGHT: 62 IN | RESPIRATION RATE: 18 BRPM | WEIGHT: 155.87 LBS | TEMPERATURE: 98.2 F | DIASTOLIC BLOOD PRESSURE: 61 MMHG | SYSTOLIC BLOOD PRESSURE: 135 MMHG | OXYGEN SATURATION: 95 % | BODY MASS INDEX: 28.68 KG/M2

## 2019-01-19 LAB
ANION GAP SERPL CALCULATED.3IONS-SCNC: 9 MMOL/L (ref 4–13)
BUN SERPL-MCNC: 19 MG/DL (ref 5–25)
CALCIUM SERPL-MCNC: 9 MG/DL (ref 8.3–10.1)
CHLORIDE SERPL-SCNC: 109 MMOL/L (ref 100–108)
CHOLEST SERPL-MCNC: 207 MG/DL (ref 50–200)
CO2 SERPL-SCNC: 26 MMOL/L (ref 21–32)
CREAT SERPL-MCNC: 0.87 MG/DL (ref 0.6–1.3)
GFR SERPL CREATININE-BSD FRML MDRD: 65 ML/MIN/1.73SQ M
GLUCOSE SERPL-MCNC: 96 MG/DL (ref 65–140)
HDLC SERPL-MCNC: 44 MG/DL (ref 40–60)
LDLC SERPL CALC-MCNC: 138 MG/DL (ref 0–100)
NONHDLC SERPL-MCNC: 163 MG/DL
POTASSIUM SERPL-SCNC: 4.2 MMOL/L (ref 3.5–5.3)
SODIUM SERPL-SCNC: 144 MMOL/L (ref 136–145)
TRIGL SERPL-MCNC: 125 MG/DL

## 2019-01-19 PROCEDURE — 80061 LIPID PANEL: CPT | Performed by: PHYSICIAN ASSISTANT

## 2019-01-19 PROCEDURE — 99239 HOSP IP/OBS DSCHRG MGMT >30: CPT | Performed by: INTERNAL MEDICINE

## 2019-01-19 PROCEDURE — 80048 BASIC METABOLIC PNL TOTAL CA: CPT | Performed by: INTERNAL MEDICINE

## 2019-01-19 RX ORDER — ASPIRIN 81 MG/1
81 TABLET, CHEWABLE ORAL DAILY
Refills: 0
Start: 2019-01-20 | End: 2019-05-16

## 2019-01-19 RX ORDER — CLOPIDOGREL BISULFATE 75 MG/1
75 TABLET ORAL DAILY
Qty: 30 TABLET | Refills: 0 | Status: SHIPPED | OUTPATIENT
Start: 2019-01-20 | End: 2019-05-16

## 2019-01-19 RX ADMIN — CLOPIDOGREL 75 MG: 75 TABLET, FILM COATED ORAL at 08:40

## 2019-01-19 RX ADMIN — METOPROLOL TARTRATE 12.5 MG: 25 TABLET ORAL at 08:40

## 2019-01-19 RX ADMIN — ASPIRIN 81 MG 81 MG: 81 TABLET ORAL at 08:40

## 2019-01-19 RX ADMIN — Medication 2000 MG: at 08:40

## 2019-01-19 RX ADMIN — VITAMIN D, TAB 1000IU (100/BT) 1000 UNITS: 25 TAB at 08:40

## 2019-01-19 RX ADMIN — Medication 30 MG: at 08:40

## 2019-01-19 NOTE — DISCHARGE SUMMARY
Discharge Summary - [facility]  Internal Medicine    Patient Information: Fuad Chand 74 y o  female MRN: [id number]  Unit/Bed#: E4 -01 Encounter: [id number]    Discharging Physician / Practitioner: Guillermina Wong MD  PCP: Di Hogue DO  Admission Date: 1/17/2019  Discharge Date: 01/19/19    Reason for Admission:  Right arm pain    Discharge Diagnoses:  NSTEMI    Principal Problem:    NSTEMI (non-ST elevated myocardial infarction) [facility])  Active Problems:    CAD (coronary artery disease), native coronary artery    Right arm pain    History of MI (myocardial infarction)    Elevated blood pressure reading  Resolved Problems:    * No resolved hospital problems  *      Consultations During Hospital Stay:  · Cardiology    Procedures Performed:     Xr Chest 2 Views    Result Date: 1/18/2019  Narrative: CHEST INDICATION:   chest pain  Right arm pain COMPARISON:  None EXAM PERFORMED/VIEWS:  XR CHEST PA & LATERAL FINDINGS: Cardiomediastinal silhouette appears unremarkable  The lungs are clear  No pneumothorax or pleural effusion  Osseous structures appear within normal limits for patient age  Impression: No acute cardiopulmonary disease  Workstation performed: [id number]         Significant Findings:     · 56-year-old with known history of coronary artery disease and previous drug eluting stents to the LAD and circumflex presented with sudden onset of severe right arm pain later evolving to substernal chest pain with initial troponin of only 0 02 she was admitted to observation and subsequently had significant elevation troponins 2 1 1 and eventually a 0 5 4 consistent with evolving NSTEMI  ·   Patient was placed on clopidogrel bolus loading and restarted on Plavix that she had discontinued for some time after her GLENNA stents prior  She is also intolerant to statins has stopped those years ago  No longer on aspirin    Based on 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 and taking anti-platelet drugs may aggravate bleeding  · She had elevated BP on presentation she was placed on low-dose beta blockade  · She was seen by Cardiology service who concurred with need for immediate cardiac catheterization showing results of an 80% focal mid LAD stenosis just prior to the previously placed stent  She underwent successful PCI with placement of a 3 0 x 15 mm GLENNA with excellent angiographic result  The previously placed stents in the LAD the left circumflex and RCA are patent of note  ·   It was reiterated to her the importance of being in stain on anti-platelet therapy in the form aspirin Plavix and she is agreeable acknowledge its importance  She continues to [facility] drug therapy in spite of the results being given to her LDL elevation 138 knee importance of statin therapy in the setting of coronary artery disease  · She is given a prescription for clopidogrel 75 mg daily and metoprolol tartrate 12 5 mg b i d  She will be given post- catheterization instructions prior to discharge  Right groin site is bandaged and intact without evidence of breakthrough bleeding or ecchymosis of significance  Incidental Findings:   · * LDL cholesterol 138, triglycerides 125 total cholesterol 207    Test Results Pending at Discharge (will require follow up): · None     Outpatient Tests Requested:  · None    Complications:  None    Hospital Course:     Cheryl Arora is a 74 y o  female patient who originally presented to the hospital on 1/17/2019 due to right arm pain and NSTEMI  Please see above significant findings for hospital course and treatment plan    Condition at Discharge: good     Discharge Day Visit / Exam:     * Please refer to separate progress for these details *    Discharge instructions/Information to patient and family:   See after visit summary for information provided to patient and family        Provisions for Follow-Up Care:  See after visit summary for information related to follow-up care and any pertinent home health orders  Disposition:     Home    For Discharges to [address] [facility]:   · Not Applicable to this Patient - Not Applicable to this Patient      Discharge Statement:  I spent 56 minutes discharging the patient  This time was spent on the day of discharge  I had direct contact with the patient on the day of discharge  Greater than 50% of the total time was spent examining patient, answering all patient questions, arranging and discussing plan of care with patient as well as directly providing post-discharge instructions  Additional time then spent on discharge activities  Discharge Medications:  See after visit summary for reconciled discharge medications provided to patient and family  ** Please Note: Dragon 360 Dictation voice to text software may have been used in the creation of this document   **

## 2019-01-19 NOTE — PLAN OF CARE
INFECTION - ADULT     Absence of fever/infection during neutropenic period Completed          CARDIOVASCULAR - ADULT     Maintains optimal cardiac output and hemodynamic stability Progressing     Absence of cardiac dysrhythmias or at baseline rhythm Progressing        DISCHARGE PLANNING     Discharge to home or other facility with appropriate resources Progressing        HEMATOLOGIC - ADULT     Maintains hematologic stability Progressing        INFECTION - ADULT     Absence or prevention of progression during hospitalization Progressing        Knowledge Deficit     Patient/family/caregiver demonstrates understanding of disease process, treatment plan, medications, and discharge instructions Progressing        METABOLIC, FLUID AND ELECTROLYTES - ADULT     Electrolytes maintained within normal limits Progressing     Fluid balance maintained Progressing        PAIN - ADULT     Verbalizes/displays adequate comfort level or baseline comfort level Progressing        SAFETY ADULT     Patient will remain free of falls Progressing     Maintain or return to baseline ADL function Progressing     Maintain or return mobility status to optimal level Progressing

## 2019-01-19 NOTE — DISCHARGE INSTRUCTIONS
Angina   WHAT YOU NEED TO KNOW:   Angina is pain, pressure, or tightness that is usually felt in your chest  Chest pain may come on when you are stressed or do physical activities, such as walking or exercising  Angina is caused by decreased blood flow and oxygen to your heart  These are often caused by atherosclerosis (hardening of the arteries)  If left untreated, angina may get worse, increase your risk for a heart attack, or become life-threatening  DISCHARGE INSTRUCTIONS:   Call 911 if:   · You have any of the following signs of a heart attack:      ¨ Squeezing, pressure, or pain in your chest that lasts longer than 5 minutes or returns    ¨ Discomfort or pain in your back, neck, jaw, stomach, or arm     ¨ Trouble breathing    ¨ Nausea or vomiting    ¨ Lightheadedness or a sudden cold sweat, especially with chest pain or trouble breathing    · You have chest pain that does not go away after you take medicine as directed  · You lose feeling in your face, arms, or legs, or you suddenly feel weak  Seek care immediately if:   · Your angina is happening more frequently, lasting longer, or causing worse pain  Contact your healthcare provider if:   · You are dizzy or nauseated after you take your medicine  · You have shortness of breath at rest     · You have new or worse swelling in your feet or ankles  · You have questions or concerns about your condition or care  Medicines: You may need any of the following:  · Antiplatelets , such as aspirin, help prevent blood clots  Take your antiplatelet medicine exactly as directed  These medicines make it more likely for you to bleed or bruise  If you are told to take aspirin, do not take acetaminophen or ibuprofen instead  · Blood thinners    help prevent blood clots  Examples of blood thinners include heparin and warfarin  Clots can cause strokes, heart attacks, and death   The following are general safety guidelines to follow while you are taking a blood thinner:    ¨ Watch for bleeding and bruising while you take blood thinners  Watch for bleeding from your gums or nose  Watch for blood in your urine and bowel movements  Use a soft washcloth on your skin, and a soft toothbrush to brush your teeth  This can keep your skin and gums from bleeding  If you shave, use an electric shaver  Do not play contact sports  ¨ Tell your dentist and other healthcare providers that you take anticoagulants  Wear a bracelet or necklace that says you take this medicine  ¨ Do not start or stop any medicines unless your healthcare provider tells you to  Many medicines cannot be used with blood thinners  ¨ Tell your healthcare provider right away if you forget to take the medicine, or if you take too much  ¨ Warfarin  is a blood thinner that you may need to take  The following are things you should be aware of if you take warfarin  § Foods and medicines can affect the amount of warfarin in your blood  Do not make major changes to your diet while you take warfarin  Warfarin works best when you eat about the same amount of vitamin K every day  Vitamin K is found in green leafy vegetables and certain other foods  Ask for more information about what to eat when you are taking warfarin  § You will need to see your healthcare provider for follow-up visits when you are on warfarin  You will need regular blood tests  These tests are used to decide how much medicine you need  · Other medicines  may be given to open the arteries to your heart, slow your heartbeat, or decrease your blood pressure or cholesterol  · Do not take certain medicines without asking your healthcare provider first   These include NSAIDs, herbal or vitamin supplements, or hormones (estrogen or progestin)  · Take your medicine as directed  Contact your healthcare provider if you think your medicine is not helping or if you have side effects   Tell him or her if you are allergic to any medicine  Keep a list of the medicines, vitamins, and herbs you take  Include the amounts, and when and why you take them  Bring the list or the pill bottles to follow-up visits  Carry your medicine list with you in case of an emergency  Follow up with your healthcare provider as directed:  Keep a record or a calendar with details about your chest pain  Every time you have pain or symptoms, record what the pain is like, how long it lasts, and how severe it is  Also record what you are doing when the pain starts, and what makes it go away  Bring this with you every time you see your healthcare provider  Write down your questions so you remember to ask them during your visits  Cardiac rehabilitation:  Your healthcare provider may recommend that you attend cardiac rehabilitation (rehab)  This is a program run by specialists who will help you safely strengthen your heart and prevent more heart disease  The plan includes exercise, relaxation, stress management, and heart-healthy nutrition  Healthcare providers will also check to make sure any medicines you are taking are working  The plan may also include instructions for when you can drive, return to work, and do other normal daily activities  Manage angina:   · Do not smoke  Nicotine and other chemicals in cigarettes and cigars can cause heart and lung damage  Ask your healthcare provider for information if you currently smoke and need help to quit  E-cigarettes or smokeless tobacco still contain nicotine  Talk to your healthcare provider before you use these products  · Maintain a healthy weight  When you weigh more than is healthy for you, your heart must work harder  You are at higher risk for serious health problems if you are overweight  Ask your healthcare provider how much you should weigh  Ask him to help you create a weight loss plan if you are overweight  · Ask about activity    Your healthcare provider will tell you which activities to limit or avoid  Ask about the best exercise plan for you  · Eat heart-healthy foods  Include fresh fruits and vegetables in your meal plan  Choose low-fat foods, such as skim or 1% fat milk, low-fat cheese and yogurt, fish, chicken (without skin), and lean meats  Eat two 4-ounce servings of fish high in omega-3 fats each week, such as salmon, fresh tuna, and herring  Do not eat foods that are high in sodium, such as canned foods, potato chips, salty snacks, and cold cuts  Put less table salt on your food  · Avoid activities that cause angina  Pay attention to your symptoms and find out what seems to make your angina worse  · Ask if you should have a flu vaccine  The flu vaccine will decrease your risk for an infection  An infection can put more stress on your heart and worsen your angina  © 2017 [address] [employer] Information is for End User's use only and may not be sold, redistributed or otherwise used for commercial purposes  All illustrations and images included in CareNotes® are the copyrighted property of [employer]  or [employer]  The above information is an  only  It is not intended as medical advice for individual conditions or treatments  Talk to your doctor, nurse or pharmacist before following any medical regimen to see if it is safe and effective for you

## 2019-01-20 LAB
ATRIAL RATE: 69 BPM
ATRIAL RATE: 70 BPM
ATRIAL RATE: 71 BPM
P AXIS: 49 DEGREES
P AXIS: 55 DEGREES
P AXIS: 57 DEGREES
PR INTERVAL: 190 MS
PR INTERVAL: 196 MS
PR INTERVAL: 200 MS
QRS AXIS: 26 DEGREES
QRS AXIS: 28 DEGREES
QRS AXIS: 40 DEGREES
QRSD INTERVAL: 90 MS
QRSD INTERVAL: 94 MS
QRSD INTERVAL: 96 MS
QT INTERVAL: 384 MS
QT INTERVAL: 434 MS
QT INTERVAL: 434 MS
QTC INTERVAL: 417 MS
QTC INTERVAL: 465 MS
QTC INTERVAL: 468 MS
T WAVE AXIS: 66 DEGREES
T WAVE AXIS: 76 DEGREES
T WAVE AXIS: 82 DEGREES
VENTRICULAR RATE: 69 BPM
VENTRICULAR RATE: 70 BPM
VENTRICULAR RATE: 71 BPM

## 2019-01-20 PROCEDURE — 93010 ELECTROCARDIOGRAM REPORT: CPT | Performed by: INTERNAL MEDICINE

## 2019-01-30 ENCOUNTER — TELEPHONE (OUTPATIENT)
Dept: CARDIOLOGY CLINIC | Facility: CLINIC | Age: 74
End: 2019-01-30

## 2019-01-30 NOTE — TELEPHONE ENCOUNTER
Phone call from patient, [phone]  Questioning if she can undergo a one hour massage  She reports she has this done monthly  Her concern is she was discharged from hospital 1/19/19s/p stenting   She reports she was seen by her pcp yesterday, her b/p 130/70 at ov  She denies any cardiac complaints, chest pains, sob, palpitations or dizziness  Will review with dr Allison Sebastian  Pt has ov with devonte on 2/13/19

## 2019-01-30 NOTE — TELEPHONE ENCOUNTER
Tell her ok to have massage    She is on blood thinners so perhaps prone to bruising and let masshillaryse be aware of that to be gentle

## 2019-02-13 ENCOUNTER — OFFICE VISIT (OUTPATIENT)
Dept: CARDIOLOGY CLINIC | Facility: CLINIC | Age: 74
End: 2019-02-13
Payer: MEDICARE

## 2019-02-13 VITALS
WEIGHT: 149.8 LBS | BODY MASS INDEX: 27.57 KG/M2 | HEIGHT: 62 IN | SYSTOLIC BLOOD PRESSURE: 120 MMHG | DIASTOLIC BLOOD PRESSURE: 70 MMHG | HEART RATE: 80 BPM

## 2019-02-13 DIAGNOSIS — I25.119 CORONARY ARTERY DISEASE INVOLVING NATIVE CORONARY ARTERY OF NATIVE HEART WITH ANGINA PECTORIS (HCC): ICD-10-CM

## 2019-02-13 DIAGNOSIS — I21.4 NSTEMI (NON-ST ELEVATED MYOCARDIAL INFARCTION) (HCC): ICD-10-CM

## 2019-02-13 DIAGNOSIS — Z95.5 S/P DRUG ELUTING CORONARY STENT PLACEMENT: Primary | ICD-10-CM

## 2019-02-13 DIAGNOSIS — E78.5 DYSLIPIDEMIA: Chronic | ICD-10-CM

## 2019-02-13 PROCEDURE — 93000 ELECTROCARDIOGRAM COMPLETE: CPT | Performed by: NURSE PRACTITIONER

## 2019-02-13 PROCEDURE — 99215 OFFICE O/P EST HI 40 MIN: CPT | Performed by: NURSE PRACTITIONER

## 2019-02-13 RX ORDER — NITROGLYCERIN 0.4 MG/1
0.4 TABLET SUBLINGUAL
Qty: 30 TABLET | Refills: 3 | Status: SHIPPED | OUTPATIENT
Start: 2019-02-13 | End: 2019-05-16

## 2019-02-13 NOTE — PROGRESS NOTES
Cardiology Office Visit   Awais Villareal 74 y o  female MRN: [id number]    [facility]  Ms Awais Villareal is a 74year old female with a known past medical history of   CAD   IWSTEMI in 1/2018 [facility] to LAD, PCI GLENNA to LAD, CX and RCA 1/2018  Using PEMF and whole body vibrator  Patient Active Problem List   Diagnosis    Acute ST elevation myocardial infarction (STEMI) of inferior wall (HCC)    Borderline hypertension    Dyslipidemia    CAD (coronary artery disease), native coronary artery    Right arm pain    NSTEMI (non-ST elevated myocardial infarction) ([facility] )    History of MI (myocardial infarction)    Elevated blood pressure reading     Ms Awais Villareal was recently admitted to [place] [address] on 1/17 - 1/19/19 with a NSTEMI  She presented with sudden onset of right arm pain with later CP  On admission Troponin 0 02 and elevated to 2  11  She was loaded with Plavix  Lipid panel cholesterol 207, HDL 44, Triglycerides 125,   TTE showed LVEF 60%, mild to mod MR, trace AV regurgitation, mild TR  Cardiac catheterization showed:  LM normal  Mid LAD 70% stenosis   CX mild atherosclerosis  1st OM mild atherosclerosis  RCA mild atherosclerosis , long stened area in prox to mid RCA patent   [facility] [place] GLENNA performed on the 70% lesion in mid LAD  She was discharged on ASA 81mg daily, Plavix 75mg daily, Meotprolol tartrate 12 5mg Q12 hours and Omega 3 fatty acids 2gm daily  Today Ms Awais Villareal presents to our office for a recent hospitalization follow up visit  She is accompanied by her  today  Atulwade Cesar denies CP, palpitations, dyspnea, lightheadedness or dizziness  She has many questions in the office today  Jamilah Guerrero is refusing to take statin medication  PCP ordered a lot of lab studies  I will add CBC to lab studies  Review of Systems   All other systems reviewed and are negative        Objective:   Vitals: LUE sitting 130/70  Vitals:    02/13/19 1341   BP: 120/70   BP Location: Left arm   Patient Position: Sitting   Cuff Size: Adult   Pulse: 80   Weight: 67 9 kg (149 lb 12 8 oz)   Height: 5' 2" (1 575 m)     Body mass index is 27 4 kg/m²      Wt Readings from Last 3 Encounters:   02/13/19 67 9 kg (149 lb 12 8 oz)   01/17/19 70 7 kg (155 lb 13 8 oz)   05/22/18 71 2 kg (157 lb)         Physical Exam:  GEN: Dina Lawson appears well, alert and oriented x 3, pleasant and cooperative   HEENT: pupils equal, round, and reactive to light; extraocular muscles intact  NECK: supple, no carotid bruits   HEART: regular rhythm, normal S1 and S2, no murmurs, clicks, gallops or rubs    LUNGS: clear to auscultation bilaterally; no wheezes, rales, or rhonchi   ABDOMEN: normal bowel sounds, soft, no tenderness, no distention  EXTREMITIES: peripheral pulses normal; no clubbing, cyanosis, or edema  NEURO: no focal findings   SKIN: normal without suspicious lesions on exposed skin      Current Outpatient Medications:     bimatoprost (LUMIGAN) 0 01 % ophthalmic drops, Administer 1 drop to both eyes daily at bedtime, Disp: , Rfl:     cholecalciferol (VITAMIN D3) 1,000 units tablet, Take by mouth daily, Disp: , Rfl:     clopidogrel (PLAVIX) 75 mg tablet, Take 1 tablet (75 mg total) by mouth daily, Disp: 30 tablet, Rfl: 0    co-enzyme Q-10 30 mg, Take 30 mg by mouth, Disp: , Rfl:     metoprolol tartrate (LOPRESSOR) 25 mg tablet, Take 0 5 tablets (12 5 mg total) by mouth every 12 (twelve) hours, Disp: 60 tablet, Rfl: 0    multivitamin (THERAGRAN) TABS, Take 1 tablet by mouth, Disp: , Rfl:     Omega-3 Fatty Acids (FISH OIL PO), Take 2 g by mouth, Disp: , Rfl:     ALPRAZolam (XANAX) 1 mg tablet, Take by mouth, Disp: , Rfl:     aspirin 81 mg chewable tablet, Chew 1 tablet (81 mg total) daily (Patient not taking: Reported on 2/13/2019), Disp: , Rfl: 0    conjugated estrogens (PREMARIN) vaginal cream, Insert into the vagina, Disp: , Rfl:     Magnesium 500 MG CAPS, Take by mouth, Disp: , Rfl: Labs & Results:                    Invalid input(s): LABALBU        EKG personally reviewed by HERBERT Humphrey  Assessment/Plan:   1  NSTEMI  2  CAD sp GLENNA to 70% mid LAD - continue on ASA 81mg daily, Plavix 75mg daily, Do not stop ASA or Plavix for any reason  Education on use of Plavix and ASA for one year to prevent stent thrombosis  Continue on Metoprolol tartrate 12 5mg Q12 hours, NTG 0 4mg Q5 min up to 3 doses PRN CP  Ambulatory referral to cardiac rehabilitation  Follow up with Dr Annemarie Celaya in 4 weeks   3   Dyslipidemia- refusing statin despite education, instructed on eating fresh and avoiding pre packaged foods, continue on Omega 3 fish oil 2gm daily       HERBERT Humphrey  2/13/2019  1:51 PM

## 2019-02-20 ENCOUNTER — TRANSCRIBE ORDERS (OUTPATIENT)
Dept: LAB | Facility: CLINIC | Age: 74
End: 2019-02-20

## 2019-02-20 ENCOUNTER — APPOINTMENT (OUTPATIENT)
Dept: LAB | Facility: CLINIC | Age: 74
End: 2019-02-20
Payer: MEDICARE

## 2019-02-20 DIAGNOSIS — E88.81 DYSMETABOLIC SYNDROME X: ICD-10-CM

## 2019-02-20 DIAGNOSIS — R82.991 HYPOCITRATURIA: ICD-10-CM

## 2019-02-20 DIAGNOSIS — E55.9 AVITAMINOSIS D: ICD-10-CM

## 2019-02-20 DIAGNOSIS — R42 DIZZINESS AND GIDDINESS: ICD-10-CM

## 2019-02-20 DIAGNOSIS — R79.9 ABNORMAL BLOOD CHEMISTRY: ICD-10-CM

## 2019-02-20 DIAGNOSIS — K21.9 GASTROESOPHAGEAL REFLUX DISEASE, ESOPHAGITIS PRESENCE NOT SPECIFIED: ICD-10-CM

## 2019-02-20 DIAGNOSIS — E28.39 RESISTANT OVARY SYNDROME: ICD-10-CM

## 2019-02-20 DIAGNOSIS — R51.9 FACIAL PAIN: ICD-10-CM

## 2019-02-20 DIAGNOSIS — I70.91 GENERALIZED ATHEROSCLEROSIS: ICD-10-CM

## 2019-02-20 DIAGNOSIS — I51.9 MYXEDEMA HEART DISEASE: ICD-10-CM

## 2019-02-20 DIAGNOSIS — R35.1 NOCTURIA: ICD-10-CM

## 2019-02-20 DIAGNOSIS — D50.9 IRON DEFICIENCY ANEMIA, UNSPECIFIED IRON DEFICIENCY ANEMIA TYPE: ICD-10-CM

## 2019-02-20 DIAGNOSIS — E03.9 MYXEDEMA HEART DISEASE: ICD-10-CM

## 2019-02-20 DIAGNOSIS — R73.01 IMPAIRED FASTING GLUCOSE: ICD-10-CM

## 2019-02-20 DIAGNOSIS — M25.50 PAIN IN JOINT, MULTIPLE SITES: ICD-10-CM

## 2019-02-20 DIAGNOSIS — I10 ESSENTIAL HYPERTENSION, MALIGNANT: ICD-10-CM

## 2019-02-20 DIAGNOSIS — F39 MILD MOOD DISORDER (HCC): ICD-10-CM

## 2019-02-20 DIAGNOSIS — I21.4 NSTEMI (NON-ST ELEVATED MYOCARDIAL INFARCTION) (HCC): ICD-10-CM

## 2019-02-20 DIAGNOSIS — R53.83 FATIGUE, UNSPECIFIED TYPE: ICD-10-CM

## 2019-02-20 DIAGNOSIS — M54.5 LOW BACK PAIN, UNSPECIFIED BACK PAIN LATERALITY, UNSPECIFIED CHRONICITY, WITH SCIATICA PRESENCE UNSPECIFIED: ICD-10-CM

## 2019-02-20 DIAGNOSIS — I21.4 ACUTE MYOCARDIAL INFARCTION, SUBENDOCARDIAL INFARCTION, INITIAL EPISODE OF CARE (HCC): Primary | ICD-10-CM

## 2019-02-20 LAB
ANION GAP SERPL CALCULATED.3IONS-SCNC: 8 MMOL/L (ref 4–13)
BACTERIA UR QL AUTO: ABNORMAL /HPF
BASOPHILS # BLD AUTO: 0.03 THOUSANDS/ΜL (ref 0–0.1)
BASOPHILS NFR BLD AUTO: 1 % (ref 0–1)
BILIRUB UR QL STRIP: ABNORMAL
BUN SERPL-MCNC: 19 MG/DL (ref 5–25)
CALCIUM SERPL-MCNC: 9.2 MG/DL (ref 8.3–10.1)
CHLORIDE SERPL-SCNC: 105 MMOL/L (ref 100–108)
CHOLEST SERPL-MCNC: 185 MG/DL (ref 50–200)
CLARITY UR: ABNORMAL
CO2 SERPL-SCNC: 26 MMOL/L (ref 21–32)
COLOR UR: YELLOW
CREAT SERPL-MCNC: 0.86 MG/DL (ref 0.6–1.3)
EOSINOPHIL # BLD AUTO: 0.08 THOUSAND/ΜL (ref 0–0.61)
EOSINOPHIL NFR BLD AUTO: 2 % (ref 0–6)
ERYTHROCYTE [DISTWIDTH] IN BLOOD BY AUTOMATED COUNT: 13.1 % (ref 11.6–15.1)
FERRITIN SERPL-MCNC: 343 NG/ML (ref 8–388)
FOLATE SERPL-MCNC: 14.9 NG/ML (ref 3.1–17.5)
GFR SERPL CREATININE-BSD FRML MDRD: 66 ML/MIN/1.73SQ M
GLUCOSE P FAST SERPL-MCNC: 95 MG/DL (ref 65–99)
GLUCOSE UR STRIP-MCNC: NEGATIVE MG/DL
HCT VFR BLD AUTO: 43.4 % (ref 34.8–46.1)
HDLC SERPL-MCNC: 34 MG/DL (ref 40–60)
HGB BLD-MCNC: 14.1 G/DL (ref 11.5–15.4)
HGB UR QL STRIP.AUTO: NEGATIVE
HYALINE CASTS #/AREA URNS LPF: ABNORMAL /LPF
IMM GRANULOCYTES # BLD AUTO: 0.02 THOUSAND/UL (ref 0–0.2)
IMM GRANULOCYTES NFR BLD AUTO: 0 % (ref 0–2)
IRON SERPL-MCNC: 60 UG/DL (ref 50–170)
KETONES UR STRIP-MCNC: ABNORMAL MG/DL
LDLC SERPL CALC-MCNC: 128 MG/DL (ref 0–100)
LEUKOCYTE ESTERASE UR QL STRIP: ABNORMAL
LYMPHOCYTES # BLD AUTO: 1.9 THOUSANDS/ΜL (ref 0.6–4.47)
LYMPHOCYTES NFR BLD AUTO: 39 % (ref 14–44)
MAGNESIUM SERPL-MCNC: 2.6 MG/DL (ref 1.6–2.6)
MCH RBC QN AUTO: 28.7 PG (ref 26.8–34.3)
MCHC RBC AUTO-ENTMCNC: 32.5 G/DL (ref 31.4–37.4)
MCV RBC AUTO: 88 FL (ref 82–98)
MONOCYTES # BLD AUTO: 0.4 THOUSAND/ΜL (ref 0.17–1.22)
MONOCYTES NFR BLD AUTO: 8 % (ref 4–12)
NEUTROPHILS # BLD AUTO: 2.47 THOUSANDS/ΜL (ref 1.85–7.62)
NEUTS SEG NFR BLD AUTO: 50 % (ref 43–75)
NITRITE UR QL STRIP: NEGATIVE
NON-SQ EPI CELLS URNS QL MICRO: ABNORMAL /HPF
NONHDLC SERPL-MCNC: 151 MG/DL
NRBC BLD AUTO-RTO: 0 /100 WBCS
PH UR STRIP.AUTO: 5.5 [PH] (ref 4.5–8)
PHOSPHATE SERPL-MCNC: 3.4 MG/DL (ref 2.3–4.1)
PLATELET # BLD AUTO: 372 THOUSANDS/UL (ref 149–390)
PMV BLD AUTO: 9.3 FL (ref 8.9–12.7)
POTASSIUM SERPL-SCNC: 4.4 MMOL/L (ref 3.5–5.3)
PROT UR STRIP-MCNC: NEGATIVE MG/DL
RBC # BLD AUTO: 4.91 MILLION/UL (ref 3.81–5.12)
RBC #/AREA URNS AUTO: ABNORMAL /HPF
SODIUM SERPL-SCNC: 139 MMOL/L (ref 136–145)
SP GR UR STRIP.AUTO: 1.02 (ref 1–1.03)
TRIGL SERPL-MCNC: 114 MG/DL
TSH SERPL DL<=0.05 MIU/L-ACNC: 0.42 UIU/ML (ref 0.36–3.74)
URATE SERPL-MCNC: 8.1 MG/DL (ref 2–6.8)
UROBILINOGEN UR QL STRIP.AUTO: 0.2 E.U./DL
VIT B12 SERPL-MCNC: 631 PG/ML (ref 100–900)
WBC # BLD AUTO: 4.9 THOUSAND/UL (ref 4.31–10.16)
WBC #/AREA URNS AUTO: ABNORMAL /HPF

## 2019-02-20 PROCEDURE — 85025 COMPLETE CBC W/AUTO DIFF WBC: CPT

## 2019-02-20 PROCEDURE — 82746 ASSAY OF FOLIC ACID SERUM: CPT

## 2019-02-20 PROCEDURE — 82728 ASSAY OF FERRITIN: CPT

## 2019-02-20 PROCEDURE — 84550 ASSAY OF BLOOD/URIC ACID: CPT

## 2019-02-20 PROCEDURE — 81001 URINALYSIS AUTO W/SCOPE: CPT

## 2019-02-20 PROCEDURE — 83540 ASSAY OF IRON: CPT

## 2019-02-20 PROCEDURE — 83735 ASSAY OF MAGNESIUM: CPT

## 2019-02-20 PROCEDURE — 80048 BASIC METABOLIC PNL TOTAL CA: CPT

## 2019-02-20 PROCEDURE — 80061 LIPID PANEL: CPT

## 2019-02-20 PROCEDURE — 36415 COLL VENOUS BLD VENIPUNCTURE: CPT

## 2019-02-20 PROCEDURE — 82607 VITAMIN B-12: CPT

## 2019-02-20 PROCEDURE — 84100 ASSAY OF PHOSPHORUS: CPT

## 2019-02-20 PROCEDURE — 84443 ASSAY THYROID STIM HORMONE: CPT

## 2019-03-07 ENCOUNTER — TELEPHONE (OUTPATIENT)
Dept: CARDIOLOGY CLINIC | Facility: CLINIC | Age: 74
End: 2019-03-07

## 2019-03-12 DIAGNOSIS — I21.4 NSTEMI (NON-ST ELEVATED MYOCARDIAL INFARCTION) (HCC): Primary | ICD-10-CM

## 2019-03-12 NOTE — TELEPHONE ENCOUNTER
I had not idea how to cosign something in Epic so I ordered it myself  Dara Soulier  Hopefully that gets it done

## 2019-05-01 ENCOUNTER — APPOINTMENT (OUTPATIENT)
Dept: LAB | Facility: CLINIC | Age: 74
End: 2019-05-01
Payer: MEDICARE

## 2019-05-01 DIAGNOSIS — I51.9 MYXEDEMA HEART DISEASE: ICD-10-CM

## 2019-05-01 DIAGNOSIS — R73.01 IMPAIRED FASTING GLUCOSE: Primary | ICD-10-CM

## 2019-05-01 DIAGNOSIS — E53.8 BIOTIN-(PROPIONYL-COA-CARBOXYLASE) LIGASE DEFICIENCY: ICD-10-CM

## 2019-05-01 DIAGNOSIS — R53.83 OTHER FATIGUE: ICD-10-CM

## 2019-05-01 DIAGNOSIS — E28.39 RESISTANT OVARY SYNDROME: ICD-10-CM

## 2019-05-01 DIAGNOSIS — R63.4 LOSS OF WEIGHT: ICD-10-CM

## 2019-05-01 DIAGNOSIS — E88.81 DYSMETABOLIC SYNDROME X: ICD-10-CM

## 2019-05-01 DIAGNOSIS — R51.9 FACIAL PAIN: ICD-10-CM

## 2019-05-01 DIAGNOSIS — E78.41 ELEVATED LIPOPROTEIN A LEVEL: ICD-10-CM

## 2019-05-01 DIAGNOSIS — I70.91 GENERALIZED ATHEROSCLEROSIS: ICD-10-CM

## 2019-05-01 DIAGNOSIS — M25.50 PAIN IN JOINT, MULTIPLE SITES: ICD-10-CM

## 2019-05-01 DIAGNOSIS — R82.991 HYPOCITRATURIA: ICD-10-CM

## 2019-05-01 DIAGNOSIS — R79.9 ABNORMAL BLOOD CHEMISTRY: ICD-10-CM

## 2019-05-01 DIAGNOSIS — R68.82 DECREASED LIBIDO: ICD-10-CM

## 2019-05-01 DIAGNOSIS — R42 DIZZINESS AND GIDDINESS: ICD-10-CM

## 2019-05-01 DIAGNOSIS — E03.9 MYXEDEMA HEART DISEASE: ICD-10-CM

## 2019-05-01 DIAGNOSIS — I10 ESSENTIAL HYPERTENSION, MALIGNANT: ICD-10-CM

## 2019-05-01 DIAGNOSIS — M54.5 LOW BACK PAIN, UNSPECIFIED BACK PAIN LATERALITY, UNSPECIFIED CHRONICITY, WITH SCIATICA PRESENCE UNSPECIFIED: ICD-10-CM

## 2019-05-01 DIAGNOSIS — E55.9 AVITAMINOSIS D: ICD-10-CM

## 2019-05-01 LAB
ANION GAP SERPL CALCULATED.3IONS-SCNC: 4 MMOL/L (ref 4–13)
BACTERIA UR QL AUTO: ABNORMAL /HPF
BILIRUB UR QL STRIP: NEGATIVE
BUN SERPL-MCNC: 23 MG/DL (ref 5–25)
CALCIUM SERPL-MCNC: 9.4 MG/DL (ref 8.3–10.1)
CAOX CRY URNS QL MICRO: ABNORMAL /HPF
CHLORIDE SERPL-SCNC: 109 MMOL/L (ref 100–108)
CLARITY UR: CLEAR
CO2 SERPL-SCNC: 26 MMOL/L (ref 21–32)
COLOR UR: YELLOW
CREAT SERPL-MCNC: 0.98 MG/DL (ref 0.6–1.3)
GFR SERPL CREATININE-BSD FRML MDRD: 57 ML/MIN/1.73SQ M
GLUCOSE P FAST SERPL-MCNC: 106 MG/DL (ref 65–99)
GLUCOSE UR STRIP-MCNC: NEGATIVE MG/DL
HGB UR QL STRIP.AUTO: NEGATIVE
KETONES UR STRIP-MCNC: NEGATIVE MG/DL
LEUKOCYTE ESTERASE UR QL STRIP: ABNORMAL
MAGNESIUM SERPL-MCNC: 2.4 MG/DL (ref 1.6–2.6)
NITRITE UR QL STRIP: NEGATIVE
NON-SQ EPI CELLS URNS QL MICRO: ABNORMAL /HPF
PH UR STRIP.AUTO: 5.5 [PH]
PHOSPHATE SERPL-MCNC: 3.4 MG/DL (ref 2.3–4.1)
POTASSIUM SERPL-SCNC: 4.6 MMOL/L (ref 3.5–5.3)
PROT UR STRIP-MCNC: NEGATIVE MG/DL
RBC #/AREA URNS AUTO: ABNORMAL /HPF
SODIUM SERPL-SCNC: 139 MMOL/L (ref 136–145)
SP GR UR STRIP.AUTO: 1.02 (ref 1–1.03)
TSH SERPL DL<=0.05 MIU/L-ACNC: 1.29 UIU/ML (ref 0.36–3.74)
URATE SERPL-MCNC: 5.5 MG/DL (ref 2–6.8)
UROBILINOGEN UR QL STRIP.AUTO: 0.2 E.U./DL
WBC #/AREA URNS AUTO: ABNORMAL /HPF

## 2019-05-01 PROCEDURE — 36415 COLL VENOUS BLD VENIPUNCTURE: CPT

## 2019-05-01 PROCEDURE — 84550 ASSAY OF BLOOD/URIC ACID: CPT

## 2019-05-01 PROCEDURE — 83735 ASSAY OF MAGNESIUM: CPT

## 2019-05-01 PROCEDURE — 84443 ASSAY THYROID STIM HORMONE: CPT

## 2019-05-01 PROCEDURE — 80048 BASIC METABOLIC PNL TOTAL CA: CPT

## 2019-05-01 PROCEDURE — 84100 ASSAY OF PHOSPHORUS: CPT

## 2019-05-01 PROCEDURE — 81001 URINALYSIS AUTO W/SCOPE: CPT

## 2019-05-16 ENCOUNTER — OFFICE VISIT (OUTPATIENT)
Dept: CARDIOLOGY CLINIC | Facility: CLINIC | Age: 74
End: 2019-05-16
Payer: MEDICARE

## 2019-05-16 VITALS
WEIGHT: 151.2 LBS | HEART RATE: 76 BPM | DIASTOLIC BLOOD PRESSURE: 74 MMHG | HEIGHT: 62 IN | BODY MASS INDEX: 27.82 KG/M2 | SYSTOLIC BLOOD PRESSURE: 120 MMHG | RESPIRATION RATE: 16 BRPM

## 2019-05-16 DIAGNOSIS — E78.5 DYSLIPIDEMIA: Chronic | ICD-10-CM

## 2019-05-16 DIAGNOSIS — R03.0 BORDERLINE HYPERTENSION: ICD-10-CM

## 2019-05-16 DIAGNOSIS — I25.10 CORONARY ARTERY DISEASE INVOLVING NATIVE CORONARY ARTERY OF NATIVE HEART WITHOUT ANGINA PECTORIS: Primary | ICD-10-CM

## 2019-05-16 PROCEDURE — 99214 OFFICE O/P EST MOD 30 MIN: CPT | Performed by: INTERNAL MEDICINE

## 2019-05-16 PROCEDURE — 1124F ACP DISCUSS-NO DSCNMKR DOCD: CPT | Performed by: INTERNAL MEDICINE

## 2019-12-11 ENCOUNTER — OFFICE VISIT (OUTPATIENT)
Dept: OBGYN CLINIC | Facility: MEDICAL CENTER | Age: 74
End: 2019-12-11
Payer: MEDICARE

## 2019-12-11 VITALS
HEIGHT: 62 IN | SYSTOLIC BLOOD PRESSURE: 136 MMHG | BODY MASS INDEX: 26.68 KG/M2 | DIASTOLIC BLOOD PRESSURE: 74 MMHG | WEIGHT: 145 LBS

## 2019-12-11 DIAGNOSIS — Z01.419 ENCNTR FOR GYN EXAM (GENERAL) (ROUTINE) W/O ABN FINDINGS: ICD-10-CM

## 2019-12-11 DIAGNOSIS — Z12.31 ENCOUNTER FOR SCREENING MAMMOGRAM FOR MALIGNANT NEOPLASM OF BREAST: ICD-10-CM

## 2019-12-11 DIAGNOSIS — N53.12: Primary | ICD-10-CM

## 2019-12-11 DIAGNOSIS — Z78.0 POSTMENOPAUSAL: ICD-10-CM

## 2019-12-11 PROCEDURE — G0101 CA SCREEN;PELVIC/BREAST EXAM: HCPCS | Performed by: NURSE PRACTITIONER

## 2019-12-11 RX ORDER — ESTRADIOL 0.1 MG/G
CREAM VAGINAL
Qty: 42.5 G | Refills: 0 | Status: SHIPPED | OUTPATIENT
Start: 2019-12-11 | End: 2022-01-27 | Stop reason: ALTCHOICE

## 2019-12-11 NOTE — PROGRESS NOTES
ASSESSMENT & PLAN: Rohini Montesinos is a 66 y o  [id number] with normal gynecologic exam     1   Routine well woman exam done today  2  Pap and HPV:  The patient's last pap and hpv was years ago  It was normal     Pap with cotesting was not done today  Current ASCCP Guidelines reviewed  3   Mammogram ordered  4  Colorectal cancer screening wasordered  5   The following were reviewed in today's visit: breast self exam, mammography screening ordered, use and side effects of HRT, menopause, osteoporosis, adequate intake of calcium and vitamin D, exercise, healthy diet, DEXA ordered and colonoscopy discussed   6  rx for dexa and cologard given  rx for estrace cream sent to pharmacy  CC:  Annual Gynecologic Examination    HPI: Rohini Montesinos is a 66 y o  [id number] who presents for annual gynecologic examination  She has the following concerns:  C/o pain with sex used estrogen cream in the past with relief of sxs, would like to use it again  Health Maintenance:    She wears her seatbelt routinely  She does perform regular monthly self breast exams  She feels safe at home       Last PAP & HPV: years ago  Last mammogram: years ago  Last colonoscopy: years ago    Past Medical History:   Diagnosis Date    Coronary artery disease     Glaucoma        Past Surgical History:   Procedure Laterality Date     SECTION      CORONARY ANGIOPLASTY WITH STENT PLACEMENT         Past OB/Gyn History:  OB History        2    Para   2    Term   2            AB        Living   2       SAB        TAB        Ectopic        Multiple        Live Births   2               History of abnormal pap smears: No        Family History   Adopted: Yes       Social History:  Social History     Socioeconomic History    Marital status: /Civil Union     Spouse name: Not on file    Number of children: Not on file    Years of education: Not on file    Highest education level: Not on file   Occupational History  Not on file   Social Needs    Financial resource strain: Not on file    Food insecurity:     Worry: Not on file     Inability: Not on file    Transportation needs:     Medical: Not on file     Non-medical: Not on file   Tobacco Use    Smoking status: Former Smoker    Smokeless tobacco: Never Used   Substance and Sexual Activity    Alcohol use: Yes     Frequency: Monthly or less     Comment: Social    Drug use: No    Sexual activity: Yes     Partners: Male     Comment:    Lifestyle    Physical activity:     Days per week: Not on file     Minutes per session: Not on file    Stress: Not on file   Relationships    Social connections:     Talks on phone: Not on file     Gets together: Not on file     Attends Mosque service: Not on file     Active member of club or organization: Not on file     Attends meetings of clubs or organizations: Not on file     Relationship status: Not on file    Intimate partner violence:     Fear of current or ex partner: Not on file     Emotionally abused: Not on file     Physically abused: Not on file     Forced sexual activity: Not on file   Other Topics Concern    Not on file   Social History Narrative    Not on file     Presently lives with spouse, is estranged from her 2 sons  Has a wonderful     Patient is currently retired     No Known Allergies      Current Outpatient Medications:     bimatoprost (LUMIGAN) 0 01 % ophthalmic drops, Administer 1 drop to both eyes daily at bedtime, Disp: , Rfl:     metFORMIN (GLUCOPHAGE) 500 mg tablet, Take 500 mg by mouth daily with breakfast, Disp: , Rfl:     Nattokinase 100 MG CAPS, Take by mouth, Disp: , Rfl:     estradiol (ESTRACE) 0 1 mg/g vaginal cream, Insert a pea size amount into vagina at hs for 2 weeks, than 1-2 times a week therafter, Disp: 42 5 g, Rfl: 0    Review of Systems  Constitutional :no fever, feels well, no tiredness, no recent weight gain or loss  ENT: no ear ache, no loss of hearing, no nosebleeds or nasal discharge, no sore throat or hoarseness  Cardiovascular: no complaints of slow or fast heart beat, no chest pain, no palpitations, no leg claudication or lower extremity edema  Respiratory: no complaints of shortness of shortness of breath, no LUZ  Breasts:no complaints of breast pain, breast lump, or nipple discharge  Gastrointestinal: no complaints of abdominal pain, constipation, nausea, vomiting, or diarrhea or bloody stools  Genitourinary : no complaints of dysuria, incontinence, pelvic pain, no dysmenorrhea, vaginal discharge or abnormal vaginal bleeding and as noted in HPI  Musculoskeletal: no complaints of arthralgia, no myalgia, no joint swelling or stiffness, no limb pain or swelling  Integumentary: no complaints of skin rash or lesion, itching or dry skin  Neurological: no complaints of headache, no confusion, no numbness or tingling, no dizziness or fainting    Objective      /74   Ht 5' 2 25" (1 581 m)   Wt 65 8 kg (145 lb)   BMI 26 31 kg/m²   General:   appears stated age, cooperative, alert normal mood and affect   Neck: normal, supple,trachea midline, no masses   Heart: regular rate and rhythm, S1, S2 normal, no murmur, click, rub or gallop   Lungs: clear to auscultation bilaterally   Breasts: normal appearance, no masses or tenderness   Abdomen: soft, non-tender, without masses or organomegaly   Vulva: normal   Vagina: normal vagina   Urethra: normal   Cervix: Normal, no discharge  Uterus: normal size, contour, position, consistency, mobility, non-tender   Adnexa: no mass, fullness, tenderness   Lymphatic palpation of lymph nodes in neck, axilla, groin and/or other locations: no lymphadenopathy or masses noted   Skin normal skin turgor and no rashes     Psychiatric orientation to person, place, and time: normal  mood and affect: normal

## 2020-01-07 ENCOUNTER — TRANSCRIBE ORDERS (OUTPATIENT)
Dept: LAB | Facility: CLINIC | Age: 75
End: 2020-01-07

## 2020-01-07 ENCOUNTER — APPOINTMENT (OUTPATIENT)
Dept: LAB | Facility: CLINIC | Age: 75
End: 2020-01-07
Payer: MEDICARE

## 2020-01-07 DIAGNOSIS — E88.81 METABOLIC SYNDROME: ICD-10-CM

## 2020-01-07 DIAGNOSIS — M25.50 ARTHRALGIA, UNSPECIFIED JOINT: ICD-10-CM

## 2020-01-07 DIAGNOSIS — R73.01 IMPAIRED FASTING GLUCOSE: ICD-10-CM

## 2020-01-07 DIAGNOSIS — E78.5 HYPERLIPIDEMIA, UNSPECIFIED HYPERLIPIDEMIA TYPE: ICD-10-CM

## 2020-01-07 DIAGNOSIS — E53.8 VITAMIN B12 DEFICIENCY: ICD-10-CM

## 2020-01-07 DIAGNOSIS — R82.90 ABNORMAL URINE: ICD-10-CM

## 2020-01-07 DIAGNOSIS — R68.82 DECREASED LIBIDO: ICD-10-CM

## 2020-01-07 DIAGNOSIS — E55.9 VITAMIN D DEFICIENCY: ICD-10-CM

## 2020-01-07 DIAGNOSIS — G44.84 PRIMARY EXERTIONAL HEADACHE: ICD-10-CM

## 2020-01-07 DIAGNOSIS — R53.83 OTHER FATIGUE: ICD-10-CM

## 2020-01-07 DIAGNOSIS — M54.50 LOW BACK PAIN, UNSPECIFIED BACK PAIN LATERALITY, UNSPECIFIED CHRONICITY, UNSPECIFIED WHETHER SCIATICA PRESENT: ICD-10-CM

## 2020-01-07 DIAGNOSIS — R35.1 NOCTURIA: ICD-10-CM

## 2020-01-07 DIAGNOSIS — R42 DIZZINESS: ICD-10-CM

## 2020-01-07 DIAGNOSIS — E03.9 HYPOTHYROIDISM, UNSPECIFIED TYPE: ICD-10-CM

## 2020-01-07 DIAGNOSIS — E34.9 ENDOCRINE DISORDER: ICD-10-CM

## 2020-01-07 DIAGNOSIS — R73.01 IMPAIRED FASTING GLUCOSE: Primary | ICD-10-CM

## 2020-01-07 DIAGNOSIS — R79.9 ABNORMAL BLOOD CHEMISTRY: ICD-10-CM

## 2020-01-07 DIAGNOSIS — E78.5 DYSLIPIDEMIA: Chronic | ICD-10-CM

## 2020-01-07 LAB
ANION GAP SERPL CALCULATED.3IONS-SCNC: 4 MMOL/L (ref 4–13)
AST SERPL W P-5'-P-CCNC: 14 U/L (ref 5–45)
BUN SERPL-MCNC: 21 MG/DL (ref 5–25)
CA-I BLD-SCNC: 1.25 MMOL/L (ref 1.12–1.32)
CALCIUM SERPL-MCNC: 9.8 MG/DL (ref 8.3–10.1)
CHLORIDE SERPL-SCNC: 111 MMOL/L (ref 100–108)
CO2 SERPL-SCNC: 26 MMOL/L (ref 21–32)
CREAT SERPL-MCNC: 0.95 MG/DL (ref 0.6–1.3)
GFR SERPL CREATININE-BSD FRML MDRD: 58 ML/MIN/1.73SQ M
GLUCOSE P FAST SERPL-MCNC: 78 MG/DL (ref 65–99)
LDLC SERPL DIRECT ASSAY-MCNC: 168 MG/DL (ref 0–100)
POTASSIUM SERPL-SCNC: 4.2 MMOL/L (ref 3.5–5.3)
PTH-INTACT SERPL-MCNC: 56.9 PG/ML (ref 18.4–80.1)
SODIUM SERPL-SCNC: 141 MMOL/L (ref 136–145)

## 2020-01-07 PROCEDURE — 82330 ASSAY OF CALCIUM: CPT

## 2020-01-07 PROCEDURE — 84450 TRANSFERASE (AST) (SGOT): CPT

## 2020-01-07 PROCEDURE — 83721 ASSAY OF BLOOD LIPOPROTEIN: CPT

## 2020-01-07 PROCEDURE — 36415 COLL VENOUS BLD VENIPUNCTURE: CPT

## 2020-01-07 PROCEDURE — 80048 BASIC METABOLIC PNL TOTAL CA: CPT

## 2020-01-07 PROCEDURE — 83970 ASSAY OF PARATHORMONE: CPT

## 2020-02-14 ENCOUNTER — HOSPITAL ENCOUNTER (OUTPATIENT)
Dept: MAMMOGRAPHY | Facility: MEDICAL CENTER | Age: 75
Discharge: HOME | End: 2020-02-14
Payer: MEDICARE

## 2020-02-14 ENCOUNTER — HOSPITAL ENCOUNTER (OUTPATIENT)
Dept: BONE DENSITY | Facility: MEDICAL CENTER | Age: 75
Discharge: HOME | End: 2020-02-14
Payer: MEDICARE

## 2020-02-14 VITALS — WEIGHT: 145 LBS | BODY MASS INDEX: 26.68 KG/M2 | HEIGHT: 62 IN

## 2020-02-14 DIAGNOSIS — Z78.0 POSTMENOPAUSAL: ICD-10-CM

## 2020-02-14 DIAGNOSIS — Z12.31 ENCOUNTER FOR SCREENING MAMMOGRAM FOR MALIGNANT NEOPLASM OF BREAST: ICD-10-CM

## 2020-02-14 PROCEDURE — 77067 SCR MAMMO BI INCL CAD: CPT

## 2020-02-14 PROCEDURE — 77080 DXA BONE DENSITY AXIAL: CPT

## 2020-02-14 PROCEDURE — 77063 BREAST TOMOSYNTHESIS BI: CPT

## 2020-02-18 ENCOUNTER — OFFICE VISIT (OUTPATIENT)
Dept: CARDIOLOGY CLINIC | Facility: CLINIC | Age: 75
End: 2020-02-18
Payer: MEDICARE

## 2020-02-18 VITALS
SYSTOLIC BLOOD PRESSURE: 124 MMHG | DIASTOLIC BLOOD PRESSURE: 80 MMHG | WEIGHT: 147 LBS | BODY MASS INDEX: 27.05 KG/M2 | HEART RATE: 72 BPM | HEIGHT: 62 IN

## 2020-02-18 DIAGNOSIS — E78.5 DYSLIPIDEMIA: Chronic | ICD-10-CM

## 2020-02-18 DIAGNOSIS — I25.10 CORONARY ARTERY DISEASE INVOLVING NATIVE CORONARY ARTERY OF NATIVE HEART WITHOUT ANGINA PECTORIS: Primary | ICD-10-CM

## 2020-02-18 PROCEDURE — 99213 OFFICE O/P EST LOW 20 MIN: CPT | Performed by: INTERNAL MEDICINE

## 2020-02-18 PROCEDURE — 93000 ELECTROCARDIOGRAM COMPLETE: CPT | Performed by: INTERNAL MEDICINE

## 2020-02-18 NOTE — PROGRESS NOTES
Cardiology Follow Up    Yeimi Cooper  1945  [id number]  [address] [facility] [id number] [phone]    Reason for visit:  9 month follow-up for CAD status post stenting of all 3 vessels in January of 2018 with repeat stenting of the left anterior descending artery in January of 2019  Also has  HLP(refuses statins)    1  Coronary artery disease involving native coronary artery of native heart without angina pectoris  POCT ECG   2  Dyslipidemia     3   Borderline hypertension         Interval History:  Since the patient's last visit, she denies chest pain, shortness breath, palpitations, edema or lightheadedness      Patient Active Problem List   Diagnosis    Acute ST elevation myocardial infarction (STEMI) of inferior wall (HCC)    Borderline hypertension    Dyslipidemia    CAD (coronary artery disease), native coronary artery    Right arm pain    [facility]    History of MI (myocardial infarction)    Elevated blood pressure reading     Past Medical History:   Diagnosis Date    Coronary artery disease     Glaucoma      Social History     Socioeconomic History    Marital status: /Civil Union     Spouse name: Not on file    Number of children: Not on file    Years of education: Not on file    Highest education level: Not on file   Occupational History    Not on file   Social Needs    Financial resource strain: Not on file    Food insecurity:     Worry: Not on file     Inability: Not on file    Transportation needs:     Medical: Not on file     Non-medical: Not on file   Tobacco Use    Smoking status: Former Smoker    Smokeless tobacco: Never Used   Substance and Sexual Activity    Alcohol use: Yes     Frequency: Monthly or less     Comment: Social    Drug use: No    Sexual activity: Yes     Partners: Male     Comment:    Lifestyle    Physical activity:     Days per week: Not on file     Minutes per session: Not on file    Stress: Not on file   Relationships    Social connections:     Talks on phone: Not on file     Gets together: Not on file     Attends Confucianist service: Not on file     Active member of club or organization: Not on file     Attends meetings of clubs or organizations: Not on file     Relationship status: Not on file    Intimate partner violence:     Fear of current or ex partner: Not on file     Emotionally abused: Not on file     Physically abused: Not on file     Forced sexual activity: Not on file   Other Topics Concern    Not on file   Social History Narrative    Not on file      Family History   Adopted: Yes     Past Surgical History:   Procedure Laterality Date     SECTION      CORONARY ANGIOPLASTY WITH STENT PLACEMENT      REDUCTION MAMMAPLASTY Bilateral        Current Outpatient Medications:     bimatoprost (LUMIGAN) 0 01 % ophthalmic drops, Administer 1 drop to both eyes daily at bedtime, Disp: , Rfl:     estradiol (ESTRACE) 0 1 mg/g vaginal cream, Insert a pea size amount into vagina at hs for 2 weeks, than 1-2 times a week therafter, Disp: 42 5 g, Rfl: 0    metFORMIN (GLUCOPHAGE) 500 mg tablet, Take 500 mg by mouth daily with breakfast, Disp: , Rfl:     Nattokinase 100 MG CAPS, Take by mouth, Disp: , Rfl:   No Known Allergies    Review of Systems:  Review of Systems   Constitutional: Negative for fatigue and unexpected weight change  Respiratory: Negative for cough, shortness of breath and wheezing  Cardiovascular: Negative for chest pain, palpitations and leg swelling  Gastrointestinal: Negative for blood in stool, constipation and diarrhea  Genitourinary: Negative for frequency and hematuria  Musculoskeletal: Negative for arthralgias and back pain  Neurological: Negative for dizziness         Physical Exam:  Vitals:    20 1144   BP: 124/80   BP Location: Right arm   Patient Position: Sitting   Cuff Size: Adult   Pulse: 72   Weight: 66 7 kg (147 lb)   Height: 5' 2" (1 575 m)       Physical Exam   Constitutional: She appears well-developed and well-nourished  No distress  HENT:   Head: Normocephalic and atraumatic  Mouth/Throat: Oropharynx is clear and moist  No oropharyngeal exudate  Eyes: Conjunctivae are normal  No scleral icterus  Neck: Neck supple  Normal carotid pulses and no JVD present  Carotid bruit is not present  No thyromegaly present  Cardiovascular: Normal rate, regular rhythm, normal heart sounds and intact distal pulses  Exam reveals no gallop and no friction rub  No murmur heard  Pulmonary/Chest: Breath sounds normal  She has no wheezes  She has no rhonchi  She has no rales  Abdominal: Soft  She exhibits no mass  There is no hepatosplenomegaly  There is no tenderness  Musculoskeletal: She exhibits no edema  Discussion/Summary:  1  CAD status post extensive stenting in 2018 and a bit over a year ago in 2019  Fortunately no issues off antiplatelet agents    Likely will no have stent thrombosis at this time    I would prefer she take ASA 81 mg daily but she declines  2  HLP-LDL in 150s    Adamantly refusing statins          Stress echo in 9 months      Cristiane Wilson MD

## 2020-02-20 NOTE — RESULT ENCOUNTER NOTE
call the patient regarding abnormal result  Please call and tell her dexa scan shows osteoporosis  She has degenerated changes and osteoarthritis making some of the results unreliable  She is at risk for hip fx  She should discuss these results  with her pcp for his  recommendation  if she would be a good candidate for Prolia or reclast infusion  Or can continue to monitor sxs    Or could see rheumatologist

## 2020-03-03 ENCOUNTER — TRANSCRIBE ORDERS (OUTPATIENT)
Dept: LAB | Facility: CLINIC | Age: 75
End: 2020-03-03

## 2020-03-03 ENCOUNTER — APPOINTMENT (OUTPATIENT)
Dept: LAB | Facility: CLINIC | Age: 75
End: 2020-03-03
Payer: MEDICARE

## 2020-03-03 DIAGNOSIS — E78.00 PURE HYPERCHOLESTEROLEMIA: ICD-10-CM

## 2020-03-03 DIAGNOSIS — R35.1 NOCTURIA: ICD-10-CM

## 2020-03-03 DIAGNOSIS — E78.5 HYPERLIPIDEMIA, UNSPECIFIED HYPERLIPIDEMIA TYPE: ICD-10-CM

## 2020-03-03 DIAGNOSIS — R82.90 ABNORMAL URINE: ICD-10-CM

## 2020-03-03 DIAGNOSIS — R79.9 ABNORMAL BLOOD CHEMISTRY: ICD-10-CM

## 2020-03-03 DIAGNOSIS — E53.8 BIOTIN-(PROPIONYL-COA-CARBOXYLASE) LIGASE DEFICIENCY: ICD-10-CM

## 2020-03-03 DIAGNOSIS — F41.9 ANXIETY: ICD-10-CM

## 2020-03-03 DIAGNOSIS — F39 MILD MOOD DISORDER (HCC): ICD-10-CM

## 2020-03-03 DIAGNOSIS — R73.01 IMPAIRED FASTING GLUCOSE: Primary | ICD-10-CM

## 2020-03-03 DIAGNOSIS — E53.8 VITAMIN B12 DEFICIENCY (NON ANEMIC): ICD-10-CM

## 2020-03-03 DIAGNOSIS — E03.9 HYPOTHYROIDISM, UNSPECIFIED TYPE: ICD-10-CM

## 2020-03-03 DIAGNOSIS — E28.39 OVARIAN FAILURE: ICD-10-CM

## 2020-03-03 DIAGNOSIS — R53.83 OTHER FATIGUE: ICD-10-CM

## 2020-03-03 DIAGNOSIS — M54.50 LOW BACK PAIN, UNSPECIFIED BACK PAIN LATERALITY, UNSPECIFIED CHRONICITY, UNSPECIFIED WHETHER SCIATICA PRESENT: ICD-10-CM

## 2020-03-03 DIAGNOSIS — M25.50 ARTHRALGIA, UNSPECIFIED JOINT: ICD-10-CM

## 2020-03-03 DIAGNOSIS — E55.9 VITAMIN D DEFICIENCY: ICD-10-CM

## 2020-03-03 DIAGNOSIS — R73.01 IMPAIRED FASTING GLUCOSE: ICD-10-CM

## 2020-03-03 DIAGNOSIS — E29.1 HYPOGONADISM MALE: ICD-10-CM

## 2020-03-03 DIAGNOSIS — R68.82 DECREASED LIBIDO: ICD-10-CM

## 2020-03-03 DIAGNOSIS — E88.81 METABOLIC SYNDROME: ICD-10-CM

## 2020-03-03 LAB
CHOLEST SERPL-MCNC: 242 MG/DL (ref 50–200)
HDLC SERPL-MCNC: 53 MG/DL
IRON SERPL-MCNC: 76 UG/DL (ref 50–170)
LDLC SERPL CALC-MCNC: 167 MG/DL (ref 0–100)
NONHDLC SERPL-MCNC: 189 MG/DL
TRIGL SERPL-MCNC: 108 MG/DL

## 2020-03-03 PROCEDURE — 80061 LIPID PANEL: CPT

## 2020-03-03 PROCEDURE — 83540 ASSAY OF IRON: CPT

## 2020-03-03 PROCEDURE — 36415 COLL VENOUS BLD VENIPUNCTURE: CPT

## 2020-04-08 ENCOUNTER — TELEMEDICINE (OUTPATIENT)
Dept: OBGYN CLINIC | Facility: MEDICAL CENTER | Age: 75
End: 2020-04-08
Payer: MEDICARE

## 2020-04-08 VITALS — BODY MASS INDEX: 26.34 KG/M2 | WEIGHT: 144 LBS

## 2020-04-08 DIAGNOSIS — M81.6 LOCALIZED OSTEOPOROSIS WITHOUT CURRENT PATHOLOGICAL FRACTURE: Primary | ICD-10-CM

## 2020-04-08 PROCEDURE — 99442 PR PHYS/QHP TELEPHONE EVALUATION 11-20 MIN: CPT | Performed by: OBSTETRICS & GYNECOLOGY

## 2020-04-08 RX ORDER — OMEGA-3-ACID ETHYL ESTERS 1 G/1
2 CAPSULE, LIQUID FILLED ORAL 2 TIMES DAILY
COMMUNITY
End: 2021-11-30

## 2020-04-08 RX ORDER — MAGNESIUM 30 MG
30 TABLET ORAL DAILY
COMMUNITY

## 2020-04-30 NOTE — RESULT ENCOUNTER NOTE
Please call the patient regarding abnormal result  Notify that dexa shows osteoporosis and she is at risk for fracture and should discuss and consider treatment  Please refer her to rheumatologist in this building,  Dr Maci Escamilla  For a consult and treatment thanks

## 2020-11-09 ENCOUNTER — HOSPITAL ENCOUNTER (OUTPATIENT)
Dept: NON INVASIVE DIAGNOSTICS | Facility: HOSPITAL | Age: 75
Discharge: HOME | End: 2020-11-09
Attending: INTERNAL MEDICINE | Admitting: INTERNAL MEDICINE
Payer: MEDICARE

## 2020-11-09 DIAGNOSIS — I25.10 CORONARY ARTERY DISEASE INVOLVING NATIVE CORONARY ARTERY OF NATIVE HEART WITHOUT ANGINA PECTORIS: ICD-10-CM

## 2020-11-09 PROCEDURE — 93351 STRESS TTE COMPLETE: CPT

## 2020-11-09 PROCEDURE — 93350 STRESS TTE ONLY: CPT

## 2020-11-11 LAB
CHEST PAIN STATEMENT: NORMAL
MAX DIASTOLIC BP: 82 MMHG
MAX HEART RATE: 153 BPM
MAX PREDICTED HEART RATE: 143 BPM
MAX. SYSTOLIC BP: 190 MMHG
PROTOCOL NAME: NORMAL
REASON FOR TERMINATION: NORMAL
TARGET HR FORMULA: NORMAL
TEST INDICATION: NORMAL
TIME IN EXERCISE PHASE: NORMAL

## 2020-11-12 ENCOUNTER — TELEPHONE (OUTPATIENT)
Dept: CARDIOLOGY CLINIC | Facility: CLINIC | Age: 75
End: 2020-11-12

## 2021-04-05 ENCOUNTER — TRANSCRIBE ORDERS (OUTPATIENT)
Dept: LAB | Facility: CLINIC | Age: 76
End: 2021-04-05

## 2021-04-05 ENCOUNTER — APPOINTMENT (OUTPATIENT)
Dept: LAB | Facility: CLINIC | Age: 76
End: 2021-04-05
Payer: MEDICARE

## 2021-04-05 DIAGNOSIS — R79.89 SERUM CREATININE RAISED: Primary | ICD-10-CM

## 2021-04-05 DIAGNOSIS — R79.89 SERUM CREATININE RAISED: ICD-10-CM

## 2021-04-05 LAB
ALBUMIN SERPL BCP-MCNC: 3.9 G/DL (ref 3.5–5)
ANION GAP SERPL CALCULATED.3IONS-SCNC: 5 MMOL/L (ref 4–13)
BACTERIA UR QL AUTO: ABNORMAL /HPF
BILIRUB UR QL STRIP: NEGATIVE
BUN SERPL-MCNC: 19 MG/DL (ref 5–25)
CALCIUM SERPL-MCNC: 9.9 MG/DL (ref 8.3–10.1)
CHLORIDE SERPL-SCNC: 109 MMOL/L (ref 100–108)
CLARITY UR: ABNORMAL
CO2 SERPL-SCNC: 25 MMOL/L (ref 21–32)
COLOR UR: YELLOW
CREAT SERPL-MCNC: 0.96 MG/DL (ref 0.6–1.3)
CREAT UR-MCNC: 52.9 MG/DL
GFR SERPL CREATININE-BSD FRML MDRD: 57 ML/MIN/1.73SQ M
GLUCOSE P FAST SERPL-MCNC: 96 MG/DL (ref 65–99)
GLUCOSE UR STRIP-MCNC: NEGATIVE MG/DL
HGB UR QL STRIP.AUTO: NEGATIVE
HYALINE CASTS #/AREA URNS LPF: ABNORMAL /LPF
KETONES UR STRIP-MCNC: ABNORMAL MG/DL
LEUKOCYTE ESTERASE UR QL STRIP: ABNORMAL
NITRITE UR QL STRIP: NEGATIVE
NON-SQ EPI CELLS URNS QL MICRO: ABNORMAL /HPF
PH UR STRIP.AUTO: 6 [PH]
PHOSPHATE SERPL-MCNC: 3.2 MG/DL (ref 2.3–4.1)
POTASSIUM SERPL-SCNC: 4.8 MMOL/L (ref 3.5–5.3)
PROT UR STRIP-MCNC: NEGATIVE MG/DL
PROT UR-MCNC: 6 MG/DL
PROT/CREAT UR: 0.11 MG/G{CREAT} (ref 0–0.1)
RBC #/AREA URNS AUTO: ABNORMAL /HPF
SODIUM SERPL-SCNC: 139 MMOL/L (ref 136–145)
SP GR UR STRIP.AUTO: 1.01 (ref 1–1.03)
UROBILINOGEN UR QL STRIP.AUTO: 0.2 E.U./DL
WBC #/AREA URNS AUTO: ABNORMAL /HPF

## 2021-04-05 PROCEDURE — 80069 RENAL FUNCTION PANEL: CPT

## 2021-04-05 PROCEDURE — 82570 ASSAY OF URINE CREATININE: CPT

## 2021-04-05 PROCEDURE — 84156 ASSAY OF PROTEIN URINE: CPT

## 2021-04-05 PROCEDURE — 36415 COLL VENOUS BLD VENIPUNCTURE: CPT

## 2021-04-05 PROCEDURE — 81001 URINALYSIS AUTO W/SCOPE: CPT

## 2021-05-11 ENCOUNTER — TELEPHONE (OUTPATIENT)
Dept: OBGYN CLINIC | Facility: MEDICAL CENTER | Age: 76
End: 2021-05-11

## 2021-05-11 NOTE — TELEPHONE ENCOUNTER
Pt called in having questions in regards to next DXA bone scan  Pt stated that she has osteopetrosis and was wondering if she would need to come in sooner than her 2 year candida  Her last DXA scan was on 02/14/2020  Please review

## 2021-05-11 NOTE — TELEPHONE ENCOUNTER
Advised that DEXA is usually only covered every 2 year  Note from dr Juana Hillman reflects that f/u should be 18-24 months    Patient will check with medicare to see if they will cover dexa scan sooner

## 2021-05-13 ENCOUNTER — APPOINTMENT (OUTPATIENT)
Dept: LAB | Facility: CLINIC | Age: 76
End: 2021-05-13
Payer: MEDICARE

## 2021-05-13 ENCOUNTER — TRANSCRIBE ORDERS (OUTPATIENT)
Dept: LAB | Facility: CLINIC | Age: 76
End: 2021-05-13

## 2021-05-13 DIAGNOSIS — F39 MOOD DISORDER (HCC): ICD-10-CM

## 2021-05-13 DIAGNOSIS — R68.82 DECREASED LIBIDO: ICD-10-CM

## 2021-05-13 DIAGNOSIS — M25.50 PAIN IN JOINT, MULTIPLE SITES: ICD-10-CM

## 2021-05-13 DIAGNOSIS — E78.49 OTHER HYPERLIPIDEMIA: ICD-10-CM

## 2021-05-13 DIAGNOSIS — E88.81 METABOLIC SYNDROME: ICD-10-CM

## 2021-05-13 DIAGNOSIS — R79.9 ABNORMAL BLOOD CHEMISTRY: ICD-10-CM

## 2021-05-13 DIAGNOSIS — R73.01 IFG (IMPAIRED FASTING GLUCOSE): ICD-10-CM

## 2021-05-13 DIAGNOSIS — F41.9 ANXIETY: ICD-10-CM

## 2021-05-13 DIAGNOSIS — R63.5 WEIGHT GAIN: ICD-10-CM

## 2021-05-13 DIAGNOSIS — E55.9 VITAMIN D DEFICIENCY: ICD-10-CM

## 2021-05-13 DIAGNOSIS — R53.83 FATIGUE, UNSPECIFIED TYPE: ICD-10-CM

## 2021-05-13 DIAGNOSIS — E28.39 HYPOGONADISM, OVARIAN: ICD-10-CM

## 2021-05-13 DIAGNOSIS — R31.9 HEMATURIA, UNSPECIFIED TYPE: ICD-10-CM

## 2021-05-13 DIAGNOSIS — E53.8 VITAMIN B 12 DEFICIENCY: ICD-10-CM

## 2021-05-13 DIAGNOSIS — E78.00 HYPERCHOLESTEREMIA: ICD-10-CM

## 2021-05-13 DIAGNOSIS — R73.01 IFG (IMPAIRED FASTING GLUCOSE): Primary | ICD-10-CM

## 2021-05-13 DIAGNOSIS — R35.1 NOCTURIA: ICD-10-CM

## 2021-05-13 DIAGNOSIS — R82.90 ABNORMAL URINE: ICD-10-CM

## 2021-05-13 DIAGNOSIS — R51.9 NONINTRACTABLE HEADACHE, UNSPECIFIED CHRONICITY PATTERN, UNSPECIFIED HEADACHE TYPE: ICD-10-CM

## 2021-05-13 DIAGNOSIS — M54.50 LOW BACK PAIN, UNSPECIFIED BACK PAIN LATERALITY, UNSPECIFIED CHRONICITY, UNSPECIFIED WHETHER SCIATICA PRESENT: ICD-10-CM

## 2021-05-13 DIAGNOSIS — E05.90 HYPERTHYROIDISM: ICD-10-CM

## 2021-05-13 LAB
ANION GAP SERPL CALCULATED.3IONS-SCNC: 7 MMOL/L (ref 4–13)
BACTERIA UR QL AUTO: NORMAL /HPF
BILIRUB UR QL STRIP: NEGATIVE
BUN SERPL-MCNC: 14 MG/DL (ref 5–25)
CALCIUM SERPL-MCNC: 9.6 MG/DL (ref 8.3–10.1)
CHLORIDE SERPL-SCNC: 111 MMOL/L (ref 100–108)
CLARITY UR: CLEAR
CO2 SERPL-SCNC: 25 MMOL/L (ref 21–32)
COLOR UR: YELLOW
CREAT SERPL-MCNC: 0.83 MG/DL (ref 0.6–1.3)
GFR SERPL CREATININE-BSD FRML MDRD: 68 ML/MIN/1.73SQ M
GLUCOSE P FAST SERPL-MCNC: 99 MG/DL (ref 65–99)
GLUCOSE UR STRIP-MCNC: NEGATIVE MG/DL
HGB UR QL STRIP.AUTO: NEGATIVE
HYALINE CASTS #/AREA URNS LPF: NORMAL /LPF
KETONES UR STRIP-MCNC: NEGATIVE MG/DL
LEUKOCYTE ESTERASE UR QL STRIP: ABNORMAL
MAGNESIUM SERPL-MCNC: 2.5 MG/DL (ref 1.6–2.6)
NITRITE UR QL STRIP: NEGATIVE
NON-SQ EPI CELLS URNS QL MICRO: NORMAL /HPF
PH UR STRIP.AUTO: 6 [PH]
PHOSPHATE SERPL-MCNC: 3.4 MG/DL (ref 2.3–4.1)
POTASSIUM SERPL-SCNC: 4.1 MMOL/L (ref 3.5–5.3)
PROT UR STRIP-MCNC: NEGATIVE MG/DL
RBC #/AREA URNS AUTO: NORMAL /HPF
SODIUM SERPL-SCNC: 143 MMOL/L (ref 136–145)
SP GR UR STRIP.AUTO: 1.01 (ref 1–1.03)
TSH SERPL DL<=0.05 MIU/L-ACNC: 1.97 UIU/ML (ref 0.36–3.74)
UROBILINOGEN UR QL STRIP.AUTO: 0.2 E.U./DL
WBC #/AREA URNS AUTO: NORMAL /HPF

## 2021-05-13 PROCEDURE — 84443 ASSAY THYROID STIM HORMONE: CPT

## 2021-05-13 PROCEDURE — 81001 URINALYSIS AUTO W/SCOPE: CPT

## 2021-05-13 PROCEDURE — 84630 ASSAY OF ZINC: CPT

## 2021-05-13 PROCEDURE — 80048 BASIC METABOLIC PNL TOTAL CA: CPT

## 2021-05-13 PROCEDURE — 83735 ASSAY OF MAGNESIUM: CPT

## 2021-05-13 PROCEDURE — 36415 COLL VENOUS BLD VENIPUNCTURE: CPT

## 2021-05-13 PROCEDURE — 83789 MASS SPECTROMETRY QUAL/QUAN: CPT

## 2021-05-13 PROCEDURE — 84100 ASSAY OF PHOSPHORUS: CPT

## 2021-05-13 PROCEDURE — 86308 HETEROPHILE ANTIBODY SCREEN: CPT

## 2021-05-14 LAB — HETEROPH AB SER QL: NEGATIVE

## 2021-05-15 LAB
IODINE SERPL-MCNC: 37.3 UG/L (ref 40–92)
ZINC SERPL-MCNC: 107 UG/DL (ref 44–115)

## 2021-05-24 ENCOUNTER — IMMUNIZATIONS (OUTPATIENT)
Dept: FAMILY MEDICINE CLINIC | Facility: HOSPITAL | Age: 76
End: 2021-05-24

## 2021-05-24 ENCOUNTER — OFFICE VISIT (OUTPATIENT)
Dept: CARDIOLOGY CLINIC | Facility: CLINIC | Age: 76
End: 2021-05-24
Payer: MEDICARE

## 2021-05-24 VITALS
DIASTOLIC BLOOD PRESSURE: 68 MMHG | WEIGHT: 146.2 LBS | SYSTOLIC BLOOD PRESSURE: 122 MMHG | HEIGHT: 62 IN | HEART RATE: 80 BPM | BODY MASS INDEX: 26.91 KG/M2

## 2021-05-24 DIAGNOSIS — I25.10 CORONARY ARTERY DISEASE INVOLVING NATIVE CORONARY ARTERY OF NATIVE HEART WITHOUT ANGINA PECTORIS: Primary | ICD-10-CM

## 2021-05-24 DIAGNOSIS — E78.5 DYSLIPIDEMIA: Chronic | ICD-10-CM

## 2021-05-24 DIAGNOSIS — Z23 ENCOUNTER FOR IMMUNIZATION: Primary | ICD-10-CM

## 2021-05-24 PROCEDURE — 0011A SARS-COV-2 / COVID-19 MRNA VACCINE (MODERNA) 100 MCG: CPT

## 2021-05-24 PROCEDURE — 91301 SARS-COV-2 / COVID-19 MRNA VACCINE (MODERNA) 100 MCG: CPT

## 2021-05-24 PROCEDURE — 99213 OFFICE O/P EST LOW 20 MIN: CPT | Performed by: INTERNAL MEDICINE

## 2021-05-24 NOTE — PROGRESS NOTES
Cardiology Follow Up    Dina Lawson  1945  [id number]  [address] [place] [address]  [phone]      Reason for visit: Patient here for FU of CAD status post stenting 3 vessels in January 2 18 repeat stenting the left anterior descending artery 2019  Constantin Garcia also has HLP (refuses statins and all meds)      1  Coronary artery disease involving native coronary artery of native heart without angina pectoris     2  Dyslipidemia         Interval History:  Since the patient's last visit last year she did a stress echo which did show some areas of ischemia which I attributed to branch disease  She has been active walking on a regular basis    She chest pain, shortness of breath, palpitations, edema or lightheadedness      Patient Active Problem List   Diagnosis    Acute ST elevation myocardial infarction (STEMI) of inferior wall (HCC)    Borderline hypertension    Dyslipidemia    CAD (coronary artery disease), native coronary artery    Right arm pain    NSTEMI (non-ST elevated myocardial infarction) ([facility] [address] )    History of MI (myocardial infarction)    Elevated blood pressure reading     Past Medical History:   Diagnosis Date    Coronary artery disease     Glaucoma      Social History     Socioeconomic History    Marital status: /Civil Union     Spouse name: Not on file    Number of children: Not on file    Years of education: Not on file    Highest education level: Not on file   Occupational History    Not on file   Social Needs    Financial resource strain: Not on file    Food insecurity     Worry: Not on file     Inability: Not on file   [employer] needs     Medical: Not on file     Non-medical: Not on file   Tobacco Use    Smoking status: Former Smoker    Smokeless tobacco: Never Used   Substance and Sexual Activity    Alcohol use: Yes     Frequency: Monthly or less     Comment: Social  Drug use: No    Sexual activity: Yes     Partners: Male     Comment:    Lifestyle    Physical activity     Days per week: Not on file     Minutes per session: Not on file    Stress: Not on file   Relationships    Social connections     Talks on phone: Not on file     Gets together: Not on file     Attends Shinto service: Not on file     Active member of club or organization: Not on file     Attends meetings of clubs or organizations: Not on file     Relationship status: Not on file    Intimate partner violence     Fear of current or ex partner: Not on file     Emotionally abused: Not on file     Physically abused: Not on file     Forced sexual activity: Not on file   Other Topics Concern    Not on file   Social History Narrative    Not on file      Family History   Adopted: Yes     Past Surgical History:   Procedure Laterality Date     SECTION      CORONARY ANGIOPLASTY WITH STENT PLACEMENT      REDUCTION MAMMAPLASTY Bilateral        Current Outpatient Medications:     bimatoprost (LUMIGAN) 0 01 % ophthalmic drops, Administer 1 drop to both eyes daily at bedtime, Disp: , Rfl:     Calcium Carbonate-Vit D-Min (CALCIUM 600+D3 PLUS MINERALS) 600-800 MG-UNIT TABS, Take 1 tablet by mouth daily, Disp: , Rfl:     estradiol (ESTRACE) 0 1 mg/g vaginal cream, Insert a pea size amount into vagina at hs for 2 weeks, than 1-2 times a week therafter, Disp: 42 5 g, Rfl: 0    magnesium 30 MG tablet, Take 30 mg by mouth 2 (two) times a day, Disp: , Rfl:     metFORMIN (GLUCOPHAGE) 500 mg tablet, Take 500 mg by mouth daily with breakfast, Disp: , Rfl:     Nattokinase 100 MG CAPS, Take by mouth, Disp: , Rfl:     omega-3-acid ethyl esters (LOVAZA) 1 g capsule, Take 2 g by mouth 2 (two) times a day, Disp: , Rfl:   No Known Allergies      Review of Systems:  Review of Systems   Constitutional: Negative for appetite change, fatigue and unexpected weight change     Respiratory: Negative for cough, shortness of breath and wheezing  Cardiovascular: Negative for chest pain, palpitations and leg swelling  Gastrointestinal: Negative for blood in stool, constipation and diarrhea  Genitourinary: Negative for dysuria, frequency and hematuria  Musculoskeletal: Negative for arthralgias and back pain  Neurological: Negative for dizziness, light-headedness and headaches  Physical Exam:  Vitals:    05/24/21 1558   BP: 122/68   Pulse: 80   Weight: 66 3 kg (146 lb 3 2 oz)   Height: 5' 2" (1 575 m)       Physical Exam  Constitutional:       General: She is not in acute distress  Appearance: She is normal weight  HENT:      Head: Normocephalic and atraumatic  Mouth/Throat:      Mouth: Mucous membranes are moist       Pharynx: No oropharyngeal exudate  Eyes:      General: No scleral icterus  Conjunctiva/sclera: Conjunctivae normal    Neck:      Musculoskeletal: Neck supple  Thyroid: No thyroid mass or thyromegaly  Vascular: Normal carotid pulses  No carotid bruit or JVD  Cardiovascular:      Rate and Rhythm: Normal rate and regular rhythm  Pulses: Normal pulses  Heart sounds: No murmur  No friction rub  No gallop  Pulmonary:      Breath sounds: Normal breath sounds  No wheezing, rhonchi or rales  Abdominal:      Palpations: Abdomen is soft  There is no hepatomegaly, splenomegaly or mass  Tenderness: There is no abdominal tenderness  Musculoskeletal:         General: No swelling  Discussion/Summary:  1  CAD s/p multiple stents in 2018 with stenting of the LAD in 2019    had abnormal stress echo 11/202  Dara Soulier Decision not to cath in light of reluctance to take DAPT in the past  having no sx    does not want to be on ASA  Dara Soulier very active and having no angina or SOB  Dara Soulier will see again in 6 months  Dara Soulier at this point repeat stress testing likely would not change anything she would not take DAPT after a stent and she does not want [facility]  2   HLP-refuses statins once again        FU 6 months        Ivonne Hurst MD

## 2021-06-16 ENCOUNTER — OFFICE VISIT (OUTPATIENT)
Dept: PODIATRY | Facility: CLINIC | Age: 76
End: 2021-06-16
Payer: MEDICARE

## 2021-06-16 VITALS
SYSTOLIC BLOOD PRESSURE: 148 MMHG | DIASTOLIC BLOOD PRESSURE: 77 MMHG | HEIGHT: 62 IN | BODY MASS INDEX: 26.76 KG/M2 | HEART RATE: 70 BPM | WEIGHT: 145.4 LBS

## 2021-06-16 DIAGNOSIS — M79.672 FOOT PAIN, LEFT: Primary | ICD-10-CM

## 2021-06-16 PROCEDURE — 99203 OFFICE O/P NEW LOW 30 MIN: CPT | Performed by: PODIATRIST

## 2021-06-16 NOTE — PROGRESS NOTES
This patient was seen on 6/16/21  My role is Foot , Ankle, and Wound Specialist    SUBJECTIVE    Chief Complaint:  Oleg Calvo is here for Left foot pain  Patient ID: Mariah Dawson is a 66 y o  female  She started an extensive walking program about 6 months ago and has noted a painful callous develop Left foot  She points to her great toe  She is worried that something is "brewing" under it  She denies any drainage nor self treatment  The following portions of the patient's history were reviewed and updated as appropriate: allergies, current medications, past family history, past medical history, past social history, past surgical history and problem list     Review of Systems   Constitutional: Positive for activity change  Negative for appetite change, chills, diaphoresis, fatigue, fever and unexpected weight change  Respiratory: Negative  Cardiovascular: Negative  Gastrointestinal: Negative  Musculoskeletal: Positive for arthralgias and gait problem  OBJECTIVE      /77   Pulse 70   Ht 5' 2" (1 575 m) Comment: verbal  Wt 66 kg (145 lb 6 4 oz)   BMI 26 59 kg/m²     Foot/Ankle Musculoskeletal Exam    General      Neurological: alert      General additional comments:  I note muscle function of the anterior, posterior, evertor and invertor muscle groups of the lower leg are intact and normal  I note ROM of the ankle joint, subtalar joint, Choparts and Lisfranc's joint complexes and metatarsophalangeal joints are WNL without crepitus nor restrictions bilaterally  Neurovascular      Neurovascular - Right        Dorsalis pedis: 2+      Posterior tibial: 2+      Neurovascular - Left        Posterior tibial: 2+       Physical Exam  Vitals and nursing note reviewed  Constitutional:       General: She is not in acute distress  Appearance: Normal appearance  She is not ill-appearing, toxic-appearing or diaphoretic  Cardiovascular:      Rate and Rhythm: Normal rate  Pulses: Normal pulses  Dorsalis pedis pulses are 2+ on the right side  Posterior tibial pulses are 2+ on the right side and 2+ on the left side  Pulmonary:      Effort: Pulmonary effort is normal    Abdominal:      General: Bowel sounds are normal    Musculoskeletal:      Comments:  I note muscle function of the anterior, posterior, evertor and invertor muscle groups of the lower leg are intact and normal  I note ROM of the ankle joint, subtalar joint, Choparts and Lisfranc's joint complexes and metatarsophalangeal joints are WNL without crepitus nor restrictions bilaterally  Feet:      Right foot:      Protective Sensation: 10 sites tested  10 sites sensed  Left foot:      Protective Sensation: 10 sites tested  10 sites sensed  Skin:     Capillary Refill: Capillary refill takes less than 2 seconds  Comments: I note a non-hemorrhagic callous medial hallux Left without underlying ulcer  Neurological:      General: No focal deficit present  Mental Status: She is alert and oriented to person, place, and time  ASSESSMENT     Diagnoses and all orders for this visit:    Foot pain, left    Other orders  -     co-enzyme Q-10 30 MG capsule; Take 30 mg by mouth Three times a day         Problem List Items Addressed This Visit        Other    Foot pain, left - Primary            Problems:    Self-Limited / Minor Problem:  1  Callous with pain    PLAN    I trimmed down the lesion with a scalpel  I recommended daily use of an electric callous fabrice to limit regrowth      Return here PRN

## 2021-06-26 ENCOUNTER — IMMUNIZATIONS (OUTPATIENT)
Dept: FAMILY MEDICINE CLINIC | Facility: HOSPITAL | Age: 76
End: 2021-06-26

## 2021-06-26 DIAGNOSIS — Z23 ENCOUNTER FOR IMMUNIZATION: Primary | ICD-10-CM

## 2021-06-26 PROCEDURE — 91301 SARS-COV-2 / COVID-19 MRNA VACCINE (MODERNA) 100 MCG: CPT

## 2021-06-26 PROCEDURE — 0012A SARS-COV-2 / COVID-19 MRNA VACCINE (MODERNA) 100 MCG: CPT

## 2021-08-20 ENCOUNTER — APPOINTMENT (OUTPATIENT)
Dept: LAB | Facility: CLINIC | Age: 76
End: 2021-08-20
Payer: MEDICARE

## 2021-08-20 DIAGNOSIS — Z20.828 EXPOSURE TO SARS-ASSOCIATED CORONAVIRUS: ICD-10-CM

## 2021-08-20 DIAGNOSIS — R73.01 IMPAIRED FASTING GLUCOSE: ICD-10-CM

## 2021-08-20 DIAGNOSIS — U07.1 COVID-19: ICD-10-CM

## 2021-08-20 DIAGNOSIS — J06.9 ACUTE URI: ICD-10-CM

## 2021-08-20 DIAGNOSIS — R79.89 ABNORMAL LFTS: ICD-10-CM

## 2021-08-20 DIAGNOSIS — E03.9 HYPOTHYROIDISM, UNSPECIFIED TYPE: ICD-10-CM

## 2021-08-20 DIAGNOSIS — R79.9 ABNORMAL BLOOD CHEMISTRY: ICD-10-CM

## 2021-08-20 DIAGNOSIS — R53.83 OTHER FATIGUE: ICD-10-CM

## 2021-08-20 DIAGNOSIS — R68.82 DECREASED LIBIDO: ICD-10-CM

## 2021-08-20 DIAGNOSIS — E88.81 METABOLIC SYNDROME: ICD-10-CM

## 2021-08-20 DIAGNOSIS — E78.5 HYPERLIPIDEMIA, UNSPECIFIED HYPERLIPIDEMIA TYPE: ICD-10-CM

## 2021-08-20 LAB
ALT SERPL W P-5'-P-CCNC: 20 U/L (ref 12–78)
AST SERPL W P-5'-P-CCNC: 13 U/L (ref 5–45)
CHOLEST SERPL-MCNC: 304 MG/DL (ref 50–200)
ERYTHROCYTE [SEDIMENTATION RATE] IN BLOOD: 13 MM/HOUR (ref 0–29)
GGT SERPL-CCNC: 8 U/L (ref 5–85)
HDLC SERPL-MCNC: 52 MG/DL
LDLC SERPL CALC-MCNC: 232 MG/DL (ref 0–100)
NONHDLC SERPL-MCNC: 252 MG/DL
TRIGL SERPL-MCNC: 99 MG/DL

## 2021-08-20 PROCEDURE — 80061 LIPID PANEL: CPT

## 2021-08-20 PROCEDURE — 82977 ASSAY OF GGT: CPT

## 2021-08-20 PROCEDURE — 84460 ALANINE AMINO (ALT) (SGPT): CPT

## 2021-08-20 PROCEDURE — 83789 MASS SPECTROMETRY QUAL/QUAN: CPT

## 2021-08-20 PROCEDURE — 84450 TRANSFERASE (AST) (SGOT): CPT

## 2021-08-20 PROCEDURE — 36415 COLL VENOUS BLD VENIPUNCTURE: CPT

## 2021-08-20 PROCEDURE — 85652 RBC SED RATE AUTOMATED: CPT

## 2021-08-20 PROCEDURE — 82542 COL CHROMOTOGRAPHY QUAL/QUAN: CPT

## 2021-08-24 LAB — IODINE SERPL-MCNC: 47.3 UG/L (ref 40–92)

## 2021-08-25 LAB — UBIQUINONE10 SERPL-MCNC: 1.1 UG/ML (ref 0.37–2.2)

## 2021-11-04 ENCOUNTER — APPOINTMENT (OUTPATIENT)
Dept: LAB | Facility: CLINIC | Age: 76
End: 2021-11-04
Payer: MEDICARE

## 2021-11-04 DIAGNOSIS — Z01.84 IMMUNITY STATUS TESTING: ICD-10-CM

## 2021-11-04 DIAGNOSIS — J06.9 ACUTE RESPIRATORY DISEASE: ICD-10-CM

## 2021-11-04 DIAGNOSIS — U07.1 LAB TEST POSITIVE FOR DETECTION OF COVID-19 VIRUS: ICD-10-CM

## 2021-11-04 DIAGNOSIS — Z20.828 EXPOSURE TO SARS-ASSOCIATED CORONAVIRUS: ICD-10-CM

## 2021-11-04 LAB
SARS-COV-2 IGG SERPL QL IA: REACTIVE
SARS-COV-2 IGG+IGM SERPL QL IA: REACTIVE

## 2021-11-04 PROCEDURE — 86769 SARS-COV-2 COVID-19 ANTIBODY: CPT

## 2021-11-04 PROCEDURE — 36415 COLL VENOUS BLD VENIPUNCTURE: CPT

## 2021-11-04 PROCEDURE — 86618 LYME DISEASE ANTIBODY: CPT

## 2021-11-06 LAB — B BURGDOR IGG+IGM SER-ACNC: 19

## 2021-11-08 ENCOUNTER — HOSPITAL ENCOUNTER (EMERGENCY)
Facility: HOSPITAL | Age: 76
Discharge: HOME | End: 2021-11-08
Attending: EMERGENCY MEDICINE
Payer: MEDICARE

## 2021-11-08 ENCOUNTER — APPOINTMENT (EMERGENCY)
Dept: RADIOLOGY | Facility: HOSPITAL | Age: 76
End: 2021-11-08
Payer: MEDICARE

## 2021-11-08 VITALS
DIASTOLIC BLOOD PRESSURE: 78 MMHG | HEART RATE: 69 BPM | RESPIRATION RATE: 18 BRPM | SYSTOLIC BLOOD PRESSURE: 137 MMHG | OXYGEN SATURATION: 99 % | TEMPERATURE: 98.1 F

## 2021-11-08 DIAGNOSIS — R07.89 ATYPICAL CHEST PAIN: Primary | ICD-10-CM

## 2021-11-08 LAB
ANION GAP SERPL CALCULATED.3IONS-SCNC: 13 MMOL/L (ref 4–13)
APTT PPP: 27 SECONDS (ref 23–37)
ATRIAL RATE: 81 BPM
BASOPHILS # BLD AUTO: 0.03 THOUSANDS/ΜL (ref 0–0.1)
BASOPHILS NFR BLD AUTO: 1 % (ref 0–1)
BUN SERPL-MCNC: 19 MG/DL (ref 5–25)
CALCIUM SERPL-MCNC: 9.6 MG/DL (ref 8.3–10.1)
CARDIAC TROPONIN I PNL SERPL HS: 4 NG/L
CHLORIDE SERPL-SCNC: 103 MMOL/L (ref 100–108)
CO2 SERPL-SCNC: 23 MMOL/L (ref 21–32)
CREAT SERPL-MCNC: 0.89 MG/DL (ref 0.6–1.3)
EOSINOPHIL # BLD AUTO: 0.18 THOUSAND/ΜL (ref 0–0.61)
EOSINOPHIL NFR BLD AUTO: 3 % (ref 0–6)
ERYTHROCYTE [DISTWIDTH] IN BLOOD BY AUTOMATED COUNT: 14 % (ref 11.6–15.1)
GFR SERPL CREATININE-BSD FRML MDRD: 62 ML/MIN/1.73SQ M
GLUCOSE SERPL-MCNC: 89 MG/DL (ref 65–140)
HCT VFR BLD AUTO: 43.7 % (ref 34.8–46.1)
HGB BLD-MCNC: 14.3 G/DL (ref 11.5–15.4)
IMM GRANULOCYTES # BLD AUTO: 0.03 THOUSAND/UL (ref 0–0.2)
IMM GRANULOCYTES NFR BLD AUTO: 1 % (ref 0–2)
INR PPP: 1.02 (ref 0.84–1.19)
LYMPHOCYTES # BLD AUTO: 1.98 THOUSANDS/ΜL (ref 0.6–4.47)
LYMPHOCYTES NFR BLD AUTO: 33 % (ref 14–44)
MCH RBC QN AUTO: 28.2 PG (ref 26.8–34.3)
MCHC RBC AUTO-ENTMCNC: 32.7 G/DL (ref 31.4–37.4)
MCV RBC AUTO: 86 FL (ref 82–98)
MONOCYTES # BLD AUTO: 0.35 THOUSAND/ΜL (ref 0.17–1.22)
MONOCYTES NFR BLD AUTO: 6 % (ref 4–12)
NEUTROPHILS # BLD AUTO: 3.39 THOUSANDS/ΜL (ref 1.85–7.62)
NEUTS SEG NFR BLD AUTO: 56 % (ref 43–75)
NRBC BLD AUTO-RTO: 0 /100 WBCS
P AXIS: 70 DEGREES
PLATELET # BLD AUTO: 332 THOUSANDS/UL (ref 149–390)
PMV BLD AUTO: 8.6 FL (ref 8.9–12.7)
POTASSIUM SERPL-SCNC: 4 MMOL/L (ref 3.5–5.3)
PR INTERVAL: 176 MS
PROTHROMBIN TIME: 13.2 SECONDS (ref 11.6–14.5)
QRS AXIS: 49 DEGREES
QRSD INTERVAL: 90 MS
QT INTERVAL: 390 MS
QTC INTERVAL: 453 MS
RBC # BLD AUTO: 5.07 MILLION/UL (ref 3.81–5.12)
SODIUM SERPL-SCNC: 139 MMOL/L (ref 136–145)
T WAVE AXIS: 85 DEGREES
VENTRICULAR RATE: 81 BPM
WBC # BLD AUTO: 5.96 THOUSAND/UL (ref 4.31–10.16)

## 2021-11-08 PROCEDURE — 99284 EMERGENCY DEPT VISIT MOD MDM: CPT | Performed by: EMERGENCY MEDICINE

## 2021-11-08 PROCEDURE — 93010 ELECTROCARDIOGRAM REPORT: CPT | Performed by: INTERNAL MEDICINE

## 2021-11-08 PROCEDURE — 85025 COMPLETE CBC W/AUTO DIFF WBC: CPT | Performed by: EMERGENCY MEDICINE

## 2021-11-08 PROCEDURE — 93005 ELECTROCARDIOGRAM TRACING: CPT

## 2021-11-08 PROCEDURE — 85730 THROMBOPLASTIN TIME PARTIAL: CPT | Performed by: EMERGENCY MEDICINE

## 2021-11-08 PROCEDURE — 80048 BASIC METABOLIC PNL TOTAL CA: CPT | Performed by: EMERGENCY MEDICINE

## 2021-11-08 PROCEDURE — 36415 COLL VENOUS BLD VENIPUNCTURE: CPT | Performed by: EMERGENCY MEDICINE

## 2021-11-08 PROCEDURE — 85610 PROTHROMBIN TIME: CPT | Performed by: EMERGENCY MEDICINE

## 2021-11-08 PROCEDURE — 84484 ASSAY OF TROPONIN QUANT: CPT | Performed by: EMERGENCY MEDICINE

## 2021-11-08 PROCEDURE — 71045 X-RAY EXAM CHEST 1 VIEW: CPT

## 2021-11-08 PROCEDURE — 99285 EMERGENCY DEPT VISIT HI MDM: CPT

## 2021-11-27 PROBLEM — E78.01 FAMILIAL HYPERCHOLESTEREMIA: Status: ACTIVE | Noted: 2018-01-30

## 2021-11-30 ENCOUNTER — OFFICE VISIT (OUTPATIENT)
Dept: CARDIOLOGY CLINIC | Facility: CLINIC | Age: 76
End: 2021-11-30
Payer: MEDICARE

## 2021-11-30 VITALS
WEIGHT: 146 LBS | SYSTOLIC BLOOD PRESSURE: 114 MMHG | BODY MASS INDEX: 26.87 KG/M2 | DIASTOLIC BLOOD PRESSURE: 82 MMHG | HEIGHT: 62 IN | HEART RATE: 83 BPM

## 2021-11-30 DIAGNOSIS — I25.10 CORONARY ARTERY DISEASE INVOLVING NATIVE CORONARY ARTERY OF NATIVE HEART WITHOUT ANGINA PECTORIS: Primary | ICD-10-CM

## 2021-11-30 DIAGNOSIS — E78.01 FAMILIAL HYPERCHOLESTEREMIA: ICD-10-CM

## 2021-11-30 PROCEDURE — 99213 OFFICE O/P EST LOW 20 MIN: CPT | Performed by: INTERNAL MEDICINE

## 2022-01-06 ENCOUNTER — NURSE TRIAGE (OUTPATIENT)
Dept: OTHER | Facility: OTHER | Age: 77
End: 2022-01-06

## 2022-01-06 DIAGNOSIS — Z20.822 ENCOUNTER FOR SCREENING LABORATORY TESTING FOR COVID-19 VIRUS: Primary | ICD-10-CM

## 2022-01-06 PROCEDURE — U0005 INFEC AGEN DETEC AMPLI PROBE: HCPCS | Performed by: FAMILY MEDICINE

## 2022-01-06 PROCEDURE — U0003 INFECTIOUS AGENT DETECTION BY NUCLEIC ACID (DNA OR RNA); SEVERE ACUTE RESPIRATORY SYNDROME CORONAVIRUS 2 (SARS-COV-2) (CORONAVIRUS DISEASE [COVID-19]), AMPLIFIED PROBE TECHNIQUE, MAKING USE OF HIGH THROUGHPUT TECHNOLOGIES AS DESCRIBED BY CMS-2020-01-R: HCPCS | Performed by: FAMILY MEDICINE

## 2022-01-06 NOTE — TELEPHONE ENCOUNTER
Regarding: covid symptomatic sore throat (1 of 2) Edwina Zuniga  ----- Message from [employer] sent at 1/6/2022 12:52 PM EST -----  "I have a runny nose, sore throat, sneezing, and cough   I have been vaccinated "

## 2022-01-06 NOTE — TELEPHONE ENCOUNTER
Patient is requesting a covid test and does not have a [facility]'s PCP  Reports having an out of network PCP  Order placed  Patient informed of [address] through testing site and was advised of hours of operation, address, to wear a mask, and to stay in the car  Patient verbalized understanding  Patient already has a My Chart account to check for results  Advised patient to begin checking in 2-3 days after testing is completed  Reason for Disposition   [1] COVID-19 infection suspected by caller or triager AND [2] mild symptoms (cough, fever, or others) AND [3] has not gotten tested yet    Answer Assessment - Initial Assessment Questions  Were you within 6 feet or less, for up to 15 minutes or more with a person that has a confirmed COVID-19 test? Unknown    What was the date of your exposure? Unknown    Are you experiencing any symptoms attributed to the virus?  (Assess for SOB, cough, fever, difficulty breathing) Runny nose, sore throat sneezing, cough, headache    HIGH RISK: Do you have any history heart or lung conditions, weakened immune system, diabetes, Asthma, CHF, HIV, COPD, Chemo, renal failure, sickle cell, etc? Myocardial Infarct 1/2018 and 1/2019    PREGNANCY: Are you pregnant or did you recently give birth?  Denies    VACCINE: "Have you gotten the COVID-19 vaccine?" If Yes ask: "Which one, how many shots, when did you get it?" Yes, Moderna x 2 shots; will be getting booster    Protocols used: CORONAVIRUS (COVID-19) DIAGNOSED OR SUSPECTED-ADULT-[place]

## 2022-01-24 ENCOUNTER — APPOINTMENT (OUTPATIENT)
Dept: LAB | Facility: CLINIC | Age: 77
End: 2022-01-24
Payer: MEDICARE

## 2022-01-24 DIAGNOSIS — R63.4 WEIGHT LOSS: ICD-10-CM

## 2022-01-24 DIAGNOSIS — Z00.00 ROUTINE GENERAL MEDICAL EXAMINATION AT A HEALTH CARE FACILITY: ICD-10-CM

## 2022-01-24 DIAGNOSIS — E78.00 PURE HYPERCHOLESTEROLEMIA: ICD-10-CM

## 2022-01-24 DIAGNOSIS — R73.01 IFG (IMPAIRED FASTING GLUCOSE): ICD-10-CM

## 2022-01-24 DIAGNOSIS — F41.9 ANXIETY: ICD-10-CM

## 2022-01-24 LAB
ALT SERPL W P-5'-P-CCNC: 21 U/L (ref 12–78)
ANION GAP SERPL CALCULATED.3IONS-SCNC: 5 MMOL/L (ref 4–13)
AST SERPL W P-5'-P-CCNC: 13 U/L (ref 5–45)
BUN SERPL-MCNC: 17 MG/DL (ref 5–25)
CALCIUM SERPL-MCNC: 9.9 MG/DL (ref 8.3–10.1)
CHLORIDE SERPL-SCNC: 110 MMOL/L (ref 100–108)
CO2 SERPL-SCNC: 26 MMOL/L (ref 21–32)
CORTIS SERPL-MCNC: 18.8 UG/DL
CREAT SERPL-MCNC: 0.81 MG/DL (ref 0.6–1.3)
EST. AVERAGE GLUCOSE BLD GHB EST-MCNC: 108 MG/DL
GFR SERPL CREATININE-BSD FRML MDRD: 69 ML/MIN/1.73SQ M
GGT SERPL-CCNC: 11 U/L (ref 5–85)
GLUCOSE P FAST SERPL-MCNC: 106 MG/DL (ref 65–99)
HBA1C MFR BLD: 5.4 %
MAGNESIUM SERPL-MCNC: 2.3 MG/DL (ref 1.6–2.6)
PHOSPHATE SERPL-MCNC: 3.5 MG/DL (ref 2.3–4.1)
POTASSIUM SERPL-SCNC: 4.5 MMOL/L (ref 3.5–5.3)
SODIUM SERPL-SCNC: 141 MMOL/L (ref 136–145)
TSH SERPL DL<=0.05 MIU/L-ACNC: 1.42 UIU/ML (ref 0.36–3.74)

## 2022-01-24 PROCEDURE — 82533 TOTAL CORTISOL: CPT

## 2022-01-24 PROCEDURE — 84460 ALANINE AMINO (ALT) (SGPT): CPT

## 2022-01-24 PROCEDURE — 84100 ASSAY OF PHOSPHORUS: CPT

## 2022-01-24 PROCEDURE — 83735 ASSAY OF MAGNESIUM: CPT

## 2022-01-24 PROCEDURE — 36415 COLL VENOUS BLD VENIPUNCTURE: CPT

## 2022-01-24 PROCEDURE — 84443 ASSAY THYROID STIM HORMONE: CPT

## 2022-01-24 PROCEDURE — 83036 HEMOGLOBIN GLYCOSYLATED A1C: CPT

## 2022-01-24 PROCEDURE — 83789 MASS SPECTROMETRY QUAL/QUAN: CPT

## 2022-01-24 PROCEDURE — 82977 ASSAY OF GGT: CPT

## 2022-01-24 PROCEDURE — 80048 BASIC METABOLIC PNL TOTAL CA: CPT

## 2022-01-24 PROCEDURE — 84450 TRANSFERASE (AST) (SGOT): CPT

## 2022-01-25 NOTE — PROGRESS NOTES
[facility] [address] [place] [address]    ASSESSMENT/PLAN: Jesús Hernandez is a 64 y o  [id number] who presents for annual gynecologic exam     Encounter for routine gynecologic examination  - Routine well woman exam completed today  - Cervical Cancer Screening complete new partner in the last 8 yrs  Will get pap if normal no need to repeat  - STI screening offered including HIV: not indicated based on hx or requested at time of visit  - Breast Cancer Screening: Last Mammogram 02/14/2020, script given  - Colorectal cancer screening was not ordered  Cologuard- 2020  - The following were reviewed in today's visit: breast self exam, mammography screening ordered, osteoporosis, adequate intake of calcium and vitamin D, DEXA ordered and age related changes  - RTO 1 yr    Additional problems addressed at this visit:  1  Encounter for gynecological examination  -     Liquid-based pap, screening    2  Localized osteoporosis without current pathological fracture  -     DXA bone density spine hip and pelvis; Future; Expected date: 01/27/2022    3  Screening for malignant neoplasm of cervix  -     Liquid-based pap, screening    4  Encounter for screening mammogram for breast cancer  -     Mammo screening bilateral w 3d & cad; Future; Expected date: 01/27/2022          CC:  Annual Gynecologic Examination    HPI: Jesús Hernandez is a 64 y o  [id number] who presents for annual gynecologic examination  Koffi Rosales presents today for gyn exam  No vaginal bleeding since onset of menopause  unknown last pap smear- desires, Hx of abnormal pap smear none, but with new partner  Sexually active- yes  2/2020 mammogram- normal , and Cologuard 2020  Reports 7 hrs of sleep daily, 2-3 servings of calcium rich foods daily  Exercises daily- walks  1 servings of caffeine daily  Occasionally SBE  Concerns: At this time would like to get Dexa scan, but does not feel that she will take medication   Open to [facility] change options  States that she is sexually active but less frequently  Notes vaginal dryness and partner with medical problems  Cannot take vaginal estrogen- muscle pain  With  8 yrs  The following portions of the patient's history were reviewed and updated as appropriate: allergies, current medications, past family history, past medical history, obstetric history, gynecologic history, past social history, past surgical history and problem list     Review of Systems   Constitutional: Negative for activity change, appetite change, fatigue and fever  Respiratory: Negative for cough and shortness of breath  Cardiovascular: Negative for chest pain, palpitations and leg swelling  Gastrointestinal: Negative for constipation and diarrhea  Genitourinary: Positive for dyspareunia  Negative for difficulty urinating, frequency, pelvic pain, vaginal bleeding, vaginal discharge and vaginal pain  Neurological: Negative for light-headedness and headaches  Psychiatric/Behavioral: The patient is not nervous/anxious  Objective:  /74 (BP Location: Right arm, Patient Position: Sitting, Cuff Size: Standard)   Ht 5' 2" (1 575 m)   Wt 64 9 kg (143 lb)   LMP  (LMP Unknown)   BMI 26 16 kg/m²    Physical Exam  Vitals reviewed  Constitutional:       Appearance: Normal appearance  HENT:      Head: Normocephalic  Neck:      Thyroid: No thyroid mass or thyroid tenderness  Cardiovascular:      Rate and Rhythm: Normal rate and regular rhythm  Heart sounds: Normal heart sounds  Pulmonary:      Effort: Pulmonary effort is normal       Breath sounds: Normal breath sounds  Chest:   Breasts:      Right: No mass, nipple discharge, skin change, tenderness or axillary adenopathy  Left: No mass, nipple discharge, skin change, tenderness or axillary adenopathy  Abdominal:      General: There is no distension  Palpations: There is no mass  Tenderness:  There is no abdominal tenderness  There is no guarding  Genitourinary:     General: Normal vulva  Exam position: Lithotomy position  Labia:         Right: No tenderness or lesion  Left: No tenderness or lesion  Vagina: No vaginal discharge, tenderness, bleeding or lesions  Cervix: No discharge, lesion, erythema or cervical bleeding  Uterus: Normal  Not enlarged and not tender  Adnexa:         Right: No mass, tenderness or fullness  Left: No mass, tenderness or fullness  Musculoskeletal:      Cervical back: Normal range of motion  Lymphadenopathy:      Upper Body:      Right upper body: No axillary adenopathy  Left upper body: No axillary adenopathy  Skin:     General: Skin is warm and dry  Neurological:      Mental Status: She is alert     Psychiatric:         Mood and Affect: Mood normal          Behavior: Behavior normal          Judgment: Judgment normal              Олег Sutton CNM  [facility]  01/27/22 11:01 AM

## 2022-01-26 LAB — IODINE SERPL-MCNC: 49.9 UG/L (ref 40–92)

## 2022-01-27 ENCOUNTER — ANNUAL EXAM (OUTPATIENT)
Dept: OBGYN CLINIC | Facility: MEDICAL CENTER | Age: 77
End: 2022-01-27
Payer: MEDICARE

## 2022-01-27 VITALS
SYSTOLIC BLOOD PRESSURE: 118 MMHG | HEIGHT: 62 IN | DIASTOLIC BLOOD PRESSURE: 74 MMHG | BODY MASS INDEX: 26.31 KG/M2 | WEIGHT: 143 LBS

## 2022-01-27 DIAGNOSIS — M81.6 LOCALIZED OSTEOPOROSIS WITHOUT CURRENT PATHOLOGICAL FRACTURE: ICD-10-CM

## 2022-01-27 DIAGNOSIS — Z12.4 SCREENING FOR MALIGNANT NEOPLASM OF CERVIX: ICD-10-CM

## 2022-01-27 DIAGNOSIS — Z12.31 ENCOUNTER FOR SCREENING MAMMOGRAM FOR BREAST CANCER: ICD-10-CM

## 2022-01-27 DIAGNOSIS — Z01.419 ENCOUNTER FOR GYNECOLOGICAL EXAMINATION: Primary | ICD-10-CM

## 2022-01-27 PROCEDURE — G0145 SCR C/V CYTO,THINLAYER,RESCR: HCPCS | Performed by: ADVANCED PRACTICE MIDWIFE

## 2022-01-27 PROCEDURE — G0101 CA SCREEN;PELVIC/BREAST EXAM: HCPCS | Performed by: ADVANCED PRACTICE MIDWIFE

## 2022-01-27 RX ORDER — LATANOPROST 50 UG/ML
1 SOLUTION/ DROPS OPHTHALMIC DAILY
COMMUNITY
Start: 2021-12-03

## 2022-01-27 NOTE — PATIENT INSTRUCTIONS
Breast Self Exam for Women   AMBULATORY CARE:   A breast self-exam (BSE)  is a way to check your breasts for lumps and other changes  Regular BSEs can help you know how your breasts normally look and feel  Most breast lumps or changes are not cancer, but you should always have them checked by a healthcare provider  Why you should do a BSE:  Breast cancer is the most common type of cancer in women  Even if you have mammograms, you may still want to do a BSE regularly  If you know how your breasts normally feel and look, it may help you know when to contact your healthcare provider  Mammograms can miss some cancers  You may find a lump during a BSE that did not show up on a mammogram   When you should do a BSE:  If you have periods, you may want to do your BSE 1 week after your period ends  This is the time when your breasts may be the least swollen, lumpy, or tender  You can do regular BSEs even if you are breastfeeding or have breast implants  Call your doctor if:   · You find any lumps or changes in your breasts  · You have breast pain or fluid coming from your nipples  · You have questions or concerns about your condition or care  How to do a BSE:       · Look at your breasts in a mirror  Look at the size and shape of each breast and nipple  Check for swelling, lumps, dimpling, scaly skin, or other skin changes  Look for nipple changes, such as a nipple that is painful or beginning to pull inward  Gently squeeze both nipples and check to see if fluid (that is not breast milk) comes out of them  If you find any of these or other breast changes, contact your healthcare provider  Check your breasts while you sit or  the following 3 positions:    ? Hang your arms down at your sides  ? Raise your hands and join them behind your head  ? Put firm pressure with your hands on your hips  Bend slightly forward while you look at your breasts in the mirror  · Lie down and feel your breasts    When you lie down, your breast tissue spreads out evenly over your chest  This makes it easier for you to feel for lumps and anything that may not be normal for your breasts  Do a BSE on one breast at a time  ? Place a small pillow or towel under your left shoulder  Put your left arm behind your head  ? Use the 3 middle fingers of your right hand  Use your fingertip pads, on the top of your fingers  Your fingertip pad is the most sensitive part of your finger  ? Use small circles to feel your breast tissue  Use your fingertip pads to make dime-sized, overlapping circles on your breast and armpits  Use light, medium, and firm pressure  First, press lightly  Second, press with medium pressure to feel a little deeper into the breast  Last, use firm pressure to feel deep within your breast     ? Examine your entire breast area  Examine the breast area from above the breast to below the breast where you feel only ribs  Make small circles with your fingertips, starting in the middle of your armpit  Make circles going up and down the breast area  Continue toward your breast and all the way across it  Examine the area from your armpit all the way over to the middle of your chest (breastbone)  Stop at the middle of your chest     ? Move the pillow or towel to your right shoulder, and put your right arm behind your head  Use the 3 fingertip pads of your left hand, and repeat the above steps to do a BSE on your right breast     What else you can do to check for breast problems or cancer:  Talk to your healthcare provider about mammograms  A mammogram is an x-ray of your breasts to screen for breast cancer or other problems  Your provider can tell you the benefits and risks of mammograms  The first mammogram is usually at age 39 or 48  Your provider may recommend you start at 36 or younger if your risk for breast cancer is high  Mammograms usually continue every 1 to 2 years until age 76         Follow up with your doctor as directed:  Write down your questions so you remember to ask them during your visits  © Copyright [employer] 2021 Information is for End User's use only and may not be sold, redistributed or otherwise used for commercial purposes  All illustrations and images included in CareNotes® are the copyrighted property of [employer]  or Artur Lamb  The above information is an  only  It is not intended as medical advice for individual conditions or treatments  Talk to your doctor, nurse or pharmacist before following any medical regimen to see if it is safe and effective for you

## 2022-02-02 LAB
LAB AP GYN PRIMARY INTERPRETATION: NORMAL
Lab: NORMAL

## 2022-02-22 ENCOUNTER — HOSPITAL ENCOUNTER (OUTPATIENT)
Dept: MAMMOGRAPHY | Facility: MEDICAL CENTER | Age: 77
Discharge: HOME | End: 2022-02-22
Payer: MEDICARE

## 2022-02-22 ENCOUNTER — HOSPITAL ENCOUNTER (OUTPATIENT)
Dept: BONE DENSITY | Facility: MEDICAL CENTER | Age: 77
Discharge: HOME | End: 2022-02-22
Payer: MEDICARE

## 2022-02-22 VITALS — HEIGHT: 62 IN | BODY MASS INDEX: 26.31 KG/M2 | WEIGHT: 143 LBS

## 2022-02-22 DIAGNOSIS — Z12.31 ENCOUNTER FOR SCREENING MAMMOGRAM FOR BREAST CANCER: ICD-10-CM

## 2022-02-22 DIAGNOSIS — M81.6 LOCALIZED OSTEOPOROSIS WITHOUT CURRENT PATHOLOGICAL FRACTURE: ICD-10-CM

## 2022-02-22 PROCEDURE — 77080 DXA BONE DENSITY AXIAL: CPT

## 2022-02-22 PROCEDURE — 77063 BREAST TOMOSYNTHESIS BI: CPT

## 2022-02-22 PROCEDURE — 77067 SCR MAMMO BI INCL CAD: CPT

## 2022-02-28 ENCOUNTER — TELEPHONE (OUTPATIENT)
Dept: OBGYN CLINIC | Facility: MEDICAL CENTER | Age: 77
End: 2022-02-28

## 2022-02-28 PROBLEM — M81.0 AGE-RELATED OSTEOPOROSIS WITHOUT CURRENT PATHOLOGICAL FRACTURE: Status: ACTIVE | Noted: 2022-02-28

## 2022-03-03 NOTE — TELEPHONE ENCOUNTER
Spoke with Tammy and she is willing to make lifestyle changes, but does not desire to go on medication such as Boniva, Prolia etc

## 2022-04-04 ENCOUNTER — APPOINTMENT (OUTPATIENT)
Dept: LAB | Facility: CLINIC | Age: 77
End: 2022-04-04
Payer: MEDICARE

## 2022-04-04 DIAGNOSIS — K76.0 FATTY METAMORPHOSIS OF LIVER: ICD-10-CM

## 2022-04-04 DIAGNOSIS — R35.1 NOCTURIA: ICD-10-CM

## 2022-04-04 DIAGNOSIS — E55.9 AVITAMINOSIS D: ICD-10-CM

## 2022-04-04 DIAGNOSIS — A08.39 DIARRHEA DUE TO SEVERE ACUTE RESPIRATORY SYNDROME CORONAVIRUS 2 (SARS-COV-2) INFECTION: ICD-10-CM

## 2022-04-04 DIAGNOSIS — I10 ESSENTIAL HYPERTENSION, MALIGNANT: ICD-10-CM

## 2022-04-04 DIAGNOSIS — R73.01 IMPAIRED FASTING GLUCOSE: ICD-10-CM

## 2022-04-04 DIAGNOSIS — R53.83 TIREDNESS: ICD-10-CM

## 2022-04-04 DIAGNOSIS — U07.1 DIARRHEA DUE TO SEVERE ACUTE RESPIRATORY SYNDROME CORONAVIRUS 2 (SARS-COV-2) INFECTION: ICD-10-CM

## 2022-04-04 DIAGNOSIS — E53.8 BIOTIN-(PROPIONYL-COA-CARBOXYLASE) LIGASE DEFICIENCY: ICD-10-CM

## 2022-04-04 DIAGNOSIS — Z01.84 IMMUNITY STATUS TESTING: ICD-10-CM

## 2022-04-04 DIAGNOSIS — E78.00 PURE HYPERCHOLESTEROLEMIA: ICD-10-CM

## 2022-04-04 LAB
ALBUMIN SERPL BCP-MCNC: 3.2 G/DL (ref 3.5–5)
ALP SERPL-CCNC: 53 U/L (ref 46–116)
ALT SERPL W P-5'-P-CCNC: 44 U/L (ref 12–78)
ANION GAP SERPL CALCULATED.3IONS-SCNC: 5 MMOL/L (ref 4–13)
APTT PPP: 27 SECONDS (ref 23–37)
AST SERPL W P-5'-P-CCNC: 30 U/L (ref 5–45)
BILIRUB DIRECT SERPL-MCNC: 0.07 MG/DL (ref 0–0.2)
BILIRUB SERPL-MCNC: 0.54 MG/DL (ref 0.2–1)
BUN SERPL-MCNC: 16 MG/DL (ref 5–25)
CALCIUM SERPL-MCNC: 9.5 MG/DL (ref 8.3–10.1)
CHLORIDE SERPL-SCNC: 111 MMOL/L (ref 100–108)
CHOLEST SERPL-MCNC: 206 MG/DL
CO2 SERPL-SCNC: 25 MMOL/L (ref 21–32)
CREAT SERPL-MCNC: 0.9 MG/DL (ref 0.6–1.3)
CRP SERPL QL: 5.6 MG/L
D DIMER PPP FEU-MCNC: 0.28 UG/ML FEU
ERYTHROCYTE [DISTWIDTH] IN BLOOD BY AUTOMATED COUNT: 14.3 % (ref 11.6–15.1)
ERYTHROCYTE [SEDIMENTATION RATE] IN BLOOD: 9 MM/HOUR (ref 0–29)
GFR SERPL CREATININE-BSD FRML MDRD: 61 ML/MIN/1.73SQ M
GLUCOSE P FAST SERPL-MCNC: 94 MG/DL (ref 65–99)
HCT VFR BLD AUTO: 43.2 % (ref 34.8–46.1)
HDLC SERPL-MCNC: 28 MG/DL
HGB BLD-MCNC: 13.7 G/DL (ref 11.5–15.4)
INR PPP: 0.91 (ref 0.84–1.19)
INSULIN SERPL-ACNC: 9.6 MU/L (ref 3–25)
IRON SERPL-MCNC: 54 UG/DL (ref 50–170)
LDLC SERPL CALC-MCNC: 155 MG/DL (ref 0–100)
MCH RBC QN AUTO: 27.7 PG (ref 26.8–34.3)
MCHC RBC AUTO-ENTMCNC: 31.7 G/DL (ref 31.4–37.4)
MCV RBC AUTO: 87 FL (ref 82–98)
NONHDLC SERPL-MCNC: 178 MG/DL
PLATELET # BLD AUTO: 312 THOUSANDS/UL (ref 149–390)
PMV BLD AUTO: 9.3 FL (ref 8.9–12.7)
POTASSIUM SERPL-SCNC: 4.4 MMOL/L (ref 3.5–5.3)
PROT SERPL-MCNC: 6.2 G/DL (ref 6.4–8.2)
PROTHROMBIN TIME: 11.9 SECONDS (ref 11.6–14.5)
RBC # BLD AUTO: 4.95 MILLION/UL (ref 3.81–5.12)
SODIUM SERPL-SCNC: 141 MMOL/L (ref 136–145)
TRIGL SERPL-MCNC: 115 MG/DL
TSH SERPL DL<=0.05 MIU/L-ACNC: 1.95 UIU/ML (ref 0.45–4.5)
WBC # BLD AUTO: 4.4 THOUSAND/UL (ref 4.31–10.16)

## 2022-04-04 PROCEDURE — 84443 ASSAY THYROID STIM HORMONE: CPT

## 2022-04-04 PROCEDURE — 86140 C-REACTIVE PROTEIN: CPT

## 2022-04-04 PROCEDURE — 86769 SARS-COV-2 COVID-19 ANTIBODY: CPT

## 2022-04-04 PROCEDURE — 80061 LIPID PANEL: CPT

## 2022-04-04 PROCEDURE — 85610 PROTHROMBIN TIME: CPT

## 2022-04-04 PROCEDURE — 83525 ASSAY OF INSULIN: CPT

## 2022-04-04 PROCEDURE — 80076 HEPATIC FUNCTION PANEL: CPT

## 2022-04-04 PROCEDURE — 85379 FIBRIN DEGRADATION QUANT: CPT

## 2022-04-04 PROCEDURE — 85027 COMPLETE CBC AUTOMATED: CPT

## 2022-04-04 PROCEDURE — 80048 BASIC METABOLIC PNL TOTAL CA: CPT

## 2022-04-04 PROCEDURE — 36415 COLL VENOUS BLD VENIPUNCTURE: CPT

## 2022-04-04 PROCEDURE — 83540 ASSAY OF IRON: CPT

## 2022-04-04 PROCEDURE — 85652 RBC SED RATE AUTOMATED: CPT

## 2022-04-04 PROCEDURE — 85730 THROMBOPLASTIN TIME PARTIAL: CPT

## 2022-04-05 LAB
SARS-COV-2 SEMI-QUANT IGG AB: >800 AU/ML
SARS-COV-2 SPIKE AB INTERP CONTAINING ATTACHED ABBREV COVDSN: POSITIVE

## 2022-07-06 ENCOUNTER — APPOINTMENT (OUTPATIENT)
Dept: LAB | Facility: HOSPITAL | Age: 77
End: 2022-07-06
Payer: MEDICARE

## 2022-07-06 DIAGNOSIS — R53.83 FATIGUE, UNSPECIFIED TYPE: ICD-10-CM

## 2022-07-06 DIAGNOSIS — R79.9 ABNORMAL BLOOD CHEMISTRY: ICD-10-CM

## 2022-07-06 DIAGNOSIS — E03.9 HYPOTHYROIDISM, UNSPECIFIED TYPE: ICD-10-CM

## 2022-07-06 DIAGNOSIS — R51.9 NONINTRACTABLE HEADACHE, UNSPECIFIED CHRONICITY PATTERN, UNSPECIFIED HEADACHE TYPE: ICD-10-CM

## 2022-07-06 DIAGNOSIS — R82.90 ABNORMAL URINE: ICD-10-CM

## 2022-07-06 DIAGNOSIS — D50.9 IRON DEFICIENCY ANEMIA, UNSPECIFIED IRON DEFICIENCY ANEMIA TYPE: ICD-10-CM

## 2022-07-06 DIAGNOSIS — E53.8 VITAMIN B12 DEFICIENCY: ICD-10-CM

## 2022-07-06 DIAGNOSIS — M25.50 ARTHRALGIA, UNSPECIFIED JOINT: ICD-10-CM

## 2022-07-06 DIAGNOSIS — N52.9 IMPOTENCE: ICD-10-CM

## 2022-07-06 DIAGNOSIS — E34.9 ENDOCRINE DISORDER: ICD-10-CM

## 2022-07-06 DIAGNOSIS — D64.9 ANEMIA, UNSPECIFIED TYPE: ICD-10-CM

## 2022-07-06 DIAGNOSIS — I70.91 GENERALIZED ATHEROSCLEROSIS: ICD-10-CM

## 2022-07-06 DIAGNOSIS — R73.01 IFG (IMPAIRED FASTING GLUCOSE): ICD-10-CM

## 2022-07-06 DIAGNOSIS — R35.1 NOCTURIA: ICD-10-CM

## 2022-07-06 DIAGNOSIS — R31.9 HEMATURIA, UNSPECIFIED TYPE: ICD-10-CM

## 2022-07-06 DIAGNOSIS — E88.81 METABOLIC SYNDROME: ICD-10-CM

## 2022-07-06 DIAGNOSIS — U07.1 COVID: ICD-10-CM

## 2022-07-06 LAB
ANION GAP SERPL CALCULATED.3IONS-SCNC: 7 MMOL/L (ref 4–13)
BUN SERPL-MCNC: 20 MG/DL (ref 5–25)
CALCIUM SERPL-MCNC: 9.8 MG/DL (ref 8.3–10.1)
CHLORIDE SERPL-SCNC: 109 MMOL/L (ref 100–108)
CO2 SERPL-SCNC: 28 MMOL/L (ref 21–32)
CORTIS SERPL-MCNC: 15.2 UG/DL
CREAT SERPL-MCNC: 1.1 MG/DL (ref 0.6–1.3)
CRP SERPL QL: <3 MG/L
ERYTHROCYTE [SEDIMENTATION RATE] IN BLOOD: 5 MM/HOUR (ref 0–29)
EST. AVERAGE GLUCOSE BLD GHB EST-MCNC: 108 MG/DL
FOLATE SERPL-MCNC: 8 NG/ML (ref 3.1–17.5)
GFR SERPL CREATININE-BSD FRML MDRD: 48 ML/MIN/1.73SQ M
GLUCOSE P FAST SERPL-MCNC: 95 MG/DL (ref 65–99)
HBA1C MFR BLD: 5.4 %
IRON SATN MFR SERPL: 38 % (ref 15–50)
IRON SERPL-MCNC: 76 UG/DL (ref 50–170)
MAGNESIUM SERPL-MCNC: 2.2 MG/DL (ref 1.6–2.6)
PHOSPHATE SERPL-MCNC: 3.4 MG/DL (ref 2.3–4.1)
POTASSIUM SERPL-SCNC: 5.3 MMOL/L (ref 3.5–5.3)
SODIUM SERPL-SCNC: 144 MMOL/L (ref 136–145)
TIBC SERPL-MCNC: 202 UG/DL (ref 250–450)
VIT B12 SERPL-MCNC: 608 PG/ML (ref 100–900)

## 2022-07-06 PROCEDURE — 82746 ASSAY OF FOLIC ACID SERUM: CPT

## 2022-07-06 PROCEDURE — 84425 ASSAY OF VITAMIN B-1: CPT

## 2022-07-06 PROCEDURE — 84100 ASSAY OF PHOSPHORUS: CPT

## 2022-07-06 PROCEDURE — 82607 VITAMIN B-12: CPT

## 2022-07-06 PROCEDURE — 83540 ASSAY OF IRON: CPT

## 2022-07-06 PROCEDURE — 83036 HEMOGLOBIN GLYCOSYLATED A1C: CPT

## 2022-07-06 PROCEDURE — 82533 TOTAL CORTISOL: CPT

## 2022-07-06 PROCEDURE — 83735 ASSAY OF MAGNESIUM: CPT

## 2022-07-06 PROCEDURE — 83550 IRON BINDING TEST: CPT

## 2022-07-06 PROCEDURE — 80048 BASIC METABOLIC PNL TOTAL CA: CPT

## 2022-07-06 PROCEDURE — 84207 ASSAY OF VITAMIN B-6: CPT

## 2022-07-06 PROCEDURE — 86140 C-REACTIVE PROTEIN: CPT

## 2022-07-06 PROCEDURE — 36415 COLL VENOUS BLD VENIPUNCTURE: CPT

## 2022-07-06 PROCEDURE — 85652 RBC SED RATE AUTOMATED: CPT

## 2022-07-11 LAB
VIT B1 BLD-SCNC: 100.3 NMOL/L (ref 66.5–200)
VIT B6 SERPL-MCNC: 5 UG/L (ref 3.4–65.2)

## 2022-10-19 ENCOUNTER — APPOINTMENT (OUTPATIENT)
Dept: LAB | Facility: CLINIC | Age: 77
End: 2022-10-19
Payer: MEDICARE

## 2022-10-19 DIAGNOSIS — E03.9 HYPOTHYROIDISM, UNSPECIFIED TYPE: ICD-10-CM

## 2022-10-19 DIAGNOSIS — R73.01 IFG (IMPAIRED FASTING GLUCOSE): ICD-10-CM

## 2022-10-19 DIAGNOSIS — D50.9 IRON DEFICIENCY ANEMIA, UNSPECIFIED IRON DEFICIENCY ANEMIA TYPE: ICD-10-CM

## 2022-10-19 DIAGNOSIS — E88.81 METABOLIC SYNDROME: ICD-10-CM

## 2022-10-19 DIAGNOSIS — D64.9 ANEMIA, UNSPECIFIED TYPE: ICD-10-CM

## 2022-10-19 DIAGNOSIS — E34.9 ENDOCRINE DISORDER: ICD-10-CM

## 2022-10-19 LAB
ANION GAP SERPL CALCULATED.3IONS-SCNC: 6 MMOL/L (ref 4–13)
BUN SERPL-MCNC: 18 MG/DL (ref 5–25)
CALCIUM SERPL-MCNC: 9.2 MG/DL (ref 8.3–10.1)
CHLORIDE SERPL-SCNC: 110 MMOL/L (ref 96–108)
CHOLEST SERPL-MCNC: 293 MG/DL
CO2 SERPL-SCNC: 23 MMOL/L (ref 21–32)
CREAT SERPL-MCNC: 0.99 MG/DL (ref 0.6–1.3)
ERYTHROCYTE [DISTWIDTH] IN BLOOD BY AUTOMATED COUNT: 14.6 % (ref 11.6–15.1)
GFR SERPL CREATININE-BSD FRML MDRD: 54 ML/MIN/1.73SQ M
GLUCOSE P FAST SERPL-MCNC: 96 MG/DL (ref 65–99)
HCT VFR BLD AUTO: 40.1 % (ref 34.8–46.1)
HDLC SERPL-MCNC: 52 MG/DL
HGB BLD-MCNC: 12.4 G/DL (ref 11.5–15.4)
LDLC SERPL CALC-MCNC: 221 MG/DL (ref 0–100)
LDLC SERPL DIRECT ASSAY-MCNC: 230 MG/DL (ref 0–100)
MCH RBC QN AUTO: 27.9 PG (ref 26.8–34.3)
MCHC RBC AUTO-ENTMCNC: 30.9 G/DL (ref 31.4–37.4)
MCV RBC AUTO: 90 FL (ref 82–98)
NONHDLC SERPL-MCNC: 241 MG/DL
PLATELET # BLD AUTO: 328 THOUSANDS/UL (ref 149–390)
PMV BLD AUTO: 9.7 FL (ref 8.9–12.7)
POTASSIUM SERPL-SCNC: 4.8 MMOL/L (ref 3.5–5.3)
RBC # BLD AUTO: 4.44 MILLION/UL (ref 3.81–5.12)
SODIUM SERPL-SCNC: 139 MMOL/L (ref 135–147)
TRIGL SERPL-MCNC: 101 MG/DL
TSH SERPL DL<=0.05 MIU/L-ACNC: 3.22 UIU/ML (ref 0.45–4.5)
WBC # BLD AUTO: 4.39 THOUSAND/UL (ref 4.31–10.16)

## 2022-10-19 PROCEDURE — 80048 BASIC METABOLIC PNL TOTAL CA: CPT

## 2022-10-19 PROCEDURE — 83721 ASSAY OF BLOOD LIPOPROTEIN: CPT

## 2022-10-19 PROCEDURE — 85027 COMPLETE CBC AUTOMATED: CPT

## 2022-10-19 PROCEDURE — 84443 ASSAY THYROID STIM HORMONE: CPT

## 2022-10-19 PROCEDURE — 36415 COLL VENOUS BLD VENIPUNCTURE: CPT

## 2022-10-19 PROCEDURE — 80061 LIPID PANEL: CPT

## 2022-12-02 NOTE — ADDENDUM NOTE
Addended by: Cain Catalan on: 5/22/2018 09:21 AM     Modules accepted: Madiha Xerosis Aggressive Treatment: I recommended application of Cetaphil or CeraVe numerous times a day going to bed to all dry areas. I also prescribed a topical steroid for twice daily use.

## 2023-02-23 ENCOUNTER — APPOINTMENT (OUTPATIENT)
Dept: LAB | Facility: CLINIC | Age: 78
End: 2023-02-23

## 2023-02-23 DIAGNOSIS — E03.9 HYPOTHYROIDISM, UNSPECIFIED TYPE: ICD-10-CM

## 2023-02-23 DIAGNOSIS — E78.5 HYPERLIPIDEMIA, UNSPECIFIED HYPERLIPIDEMIA TYPE: ICD-10-CM

## 2023-02-23 DIAGNOSIS — E88.81 METABOLIC SYNDROME: ICD-10-CM

## 2023-02-23 DIAGNOSIS — R73.01 IFG (IMPAIRED FASTING GLUCOSE): ICD-10-CM

## 2023-02-23 DIAGNOSIS — M25.50 ARTHRALGIA, UNSPECIFIED JOINT: ICD-10-CM

## 2023-02-23 DIAGNOSIS — M79.609 PAIN IN EXTREMITY, UNSPECIFIED EXTREMITY: ICD-10-CM

## 2023-02-23 DIAGNOSIS — I25.10 CORONARY ARTERY DISEASE WITHOUT ANGINA PECTORIS, UNSPECIFIED VESSEL OR LESION TYPE, UNSPECIFIED WHETHER NATIVE OR TRANSPLANTED HEART: ICD-10-CM

## 2023-02-23 DIAGNOSIS — E78.1 HIGH TRIGLYCERIDES: ICD-10-CM

## 2023-02-23 LAB
25(OH)D3 SERPL-MCNC: 64.1 NG/ML (ref 30–100)
ANION GAP SERPL CALCULATED.3IONS-SCNC: 5 MMOL/L (ref 4–13)
BUN SERPL-MCNC: 30 MG/DL (ref 5–25)
CALCIUM SERPL-MCNC: 10.2 MG/DL (ref 8.3–10.1)
CHLORIDE SERPL-SCNC: 111 MMOL/L (ref 96–108)
CHOLEST SERPL-MCNC: 348 MG/DL
CO2 SERPL-SCNC: 23 MMOL/L (ref 21–32)
CREAT SERPL-MCNC: 1.03 MG/DL (ref 0.6–1.3)
ERYTHROCYTE [DISTWIDTH] IN BLOOD BY AUTOMATED COUNT: 14.4 % (ref 11.6–15.1)
ESTRADIOL SERPL-MCNC: <11 PG/ML
GFR SERPL CREATININE-BSD FRML MDRD: 51 ML/MIN/1.73SQ M
GLUCOSE P FAST SERPL-MCNC: 111 MG/DL (ref 65–99)
HCT VFR BLD AUTO: 42.5 % (ref 34.8–46.1)
HDLC SERPL-MCNC: 57 MG/DL
HGB BLD-MCNC: 13.6 G/DL (ref 11.5–15.4)
INSULIN SERPL-ACNC: 12.7 MU/L (ref 3–25)
IRON SERPL-MCNC: 66 UG/DL (ref 50–170)
LDLC SERPL CALC-MCNC: 267 MG/DL (ref 0–100)
MAGNESIUM SERPL-MCNC: 2.2 MG/DL (ref 1.6–2.6)
MCH RBC QN AUTO: 28.3 PG (ref 26.8–34.3)
MCHC RBC AUTO-ENTMCNC: 32 G/DL (ref 31.4–37.4)
MCV RBC AUTO: 89 FL (ref 82–98)
NONHDLC SERPL-MCNC: 291 MG/DL
PHOSPHATE SERPL-MCNC: 3.8 MG/DL (ref 2.3–4.1)
PLATELET # BLD AUTO: 387 THOUSANDS/UL (ref 149–390)
PMV BLD AUTO: 9.3 FL (ref 8.9–12.7)
POTASSIUM SERPL-SCNC: 4.3 MMOL/L (ref 3.5–5.3)
RBC # BLD AUTO: 4.8 MILLION/UL (ref 3.81–5.12)
RHEUMATOID FACT SER QL LA: NEGATIVE
SODIUM SERPL-SCNC: 139 MMOL/L (ref 135–147)
TRIGL SERPL-MCNC: 118 MG/DL
TSH SERPL DL<=0.05 MIU/L-ACNC: 3.01 UIU/ML (ref 0.45–4.5)
WBC # BLD AUTO: 5.17 THOUSAND/UL (ref 4.31–10.16)

## 2023-08-03 ENCOUNTER — APPOINTMENT (OUTPATIENT)
Dept: LAB | Facility: CLINIC | Age: 78
End: 2023-08-03
Payer: MEDICARE

## 2023-08-03 DIAGNOSIS — I25.119 CORONARY ARTERY DISEASE WITH ANGINA PECTORIS, UNSPECIFIED VESSEL OR LESION TYPE, UNSPECIFIED WHETHER NATIVE OR TRANSPLANTED HEART (HCC): ICD-10-CM

## 2023-08-03 DIAGNOSIS — R73.01 IFG (IMPAIRED FASTING GLUCOSE): ICD-10-CM

## 2023-08-03 DIAGNOSIS — E03.9 HYPOTHYROIDISM, UNSPECIFIED TYPE: ICD-10-CM

## 2023-08-03 DIAGNOSIS — R53.83 OTHER FATIGUE: ICD-10-CM

## 2023-08-03 DIAGNOSIS — E88.81 METABOLIC SYNDROME: ICD-10-CM

## 2023-08-03 DIAGNOSIS — E78.5 HYPERLIPIDEMIA, UNSPECIFIED HYPERLIPIDEMIA TYPE: ICD-10-CM

## 2023-08-03 DIAGNOSIS — E78.1 HIGH TRIGLYCERIDES: ICD-10-CM

## 2023-08-03 LAB
ANION GAP SERPL CALCULATED.3IONS-SCNC: 6 MMOL/L
BUN SERPL-MCNC: 22 MG/DL (ref 5–25)
CALCIUM SERPL-MCNC: 10 MG/DL (ref 8.3–10.1)
CHLORIDE SERPL-SCNC: 113 MMOL/L (ref 96–108)
CHOLEST SERPL-MCNC: 326 MG/DL
CO2 SERPL-SCNC: 23 MMOL/L (ref 21–32)
CREAT SERPL-MCNC: 1.01 MG/DL (ref 0.6–1.3)
ERYTHROCYTE [DISTWIDTH] IN BLOOD BY AUTOMATED COUNT: 14.4 % (ref 11.6–15.1)
EST. AVERAGE GLUCOSE BLD GHB EST-MCNC: 128 MG/DL
GFR SERPL CREATININE-BSD FRML MDRD: 53 ML/MIN/1.73SQ M
GLUCOSE P FAST SERPL-MCNC: 97 MG/DL (ref 65–99)
HBA1C MFR BLD: 6.1 %
HCT VFR BLD AUTO: 41.6 % (ref 34.8–46.1)
HDLC SERPL-MCNC: 60 MG/DL
HGB BLD-MCNC: 13.3 G/DL (ref 11.5–15.4)
INSULIN SERPL-ACNC: 6.34 UIU/ML
LDLC SERPL CALC-MCNC: 247 MG/DL (ref 0–100)
MCH RBC QN AUTO: 28.2 PG (ref 26.8–34.3)
MCHC RBC AUTO-ENTMCNC: 32 G/DL (ref 31.4–37.4)
MCV RBC AUTO: 88 FL (ref 82–98)
NONHDLC SERPL-MCNC: 266 MG/DL
PLATELET # BLD AUTO: 326 THOUSANDS/UL (ref 149–390)
PMV BLD AUTO: 9.5 FL (ref 8.9–12.7)
POTASSIUM SERPL-SCNC: 4.5 MMOL/L (ref 3.5–5.3)
RBC # BLD AUTO: 4.72 MILLION/UL (ref 3.81–5.12)
SODIUM SERPL-SCNC: 142 MMOL/L (ref 135–147)
TRIGL SERPL-MCNC: 96 MG/DL
TSH SERPL DL<=0.05 MIU/L-ACNC: 2.79 UIU/ML (ref 0.45–4.5)
WBC # BLD AUTO: 5.54 THOUSAND/UL (ref 4.31–10.16)

## 2023-08-03 PROCEDURE — 83525 ASSAY OF INSULIN: CPT

## 2023-08-03 PROCEDURE — 84443 ASSAY THYROID STIM HORMONE: CPT

## 2023-08-03 PROCEDURE — 83036 HEMOGLOBIN GLYCOSYLATED A1C: CPT

## 2023-08-03 PROCEDURE — 80061 LIPID PANEL: CPT

## 2023-08-03 PROCEDURE — 36415 COLL VENOUS BLD VENIPUNCTURE: CPT

## 2023-08-03 PROCEDURE — 85027 COMPLETE CBC AUTOMATED: CPT

## 2023-08-03 PROCEDURE — 83789 MASS SPECTROMETRY QUAL/QUAN: CPT

## 2023-08-03 PROCEDURE — 80048 BASIC METABOLIC PNL TOTAL CA: CPT

## 2023-08-08 LAB — IODINE SERPL-MCNC: 58.7 UG/L (ref 40–92)

## 2023-09-25 ENCOUNTER — APPOINTMENT (OUTPATIENT)
Dept: LAB | Facility: CLINIC | Age: 78
End: 2023-09-25
Payer: MEDICARE

## 2023-09-25 DIAGNOSIS — E03.9 HYPOTHYROIDISM, UNSPECIFIED TYPE: ICD-10-CM

## 2023-09-25 DIAGNOSIS — R73.01 IFG (IMPAIRED FASTING GLUCOSE): ICD-10-CM

## 2023-09-25 DIAGNOSIS — D64.9 ANEMIA, UNSPECIFIED TYPE: ICD-10-CM

## 2023-09-25 DIAGNOSIS — E53.8 VITAMIN B12 DEFICIENCY (NON ANEMIC): ICD-10-CM

## 2023-09-25 DIAGNOSIS — D50.9 IRON DEFICIENCY ANEMIA, UNSPECIFIED IRON DEFICIENCY ANEMIA TYPE: ICD-10-CM

## 2023-09-25 DIAGNOSIS — E34.9 ENDOCRINE DISORDER: ICD-10-CM

## 2023-09-25 DIAGNOSIS — R68.82 DECREASED LIBIDO: ICD-10-CM

## 2023-09-25 DIAGNOSIS — E55.9 VITAMIN D DEFICIENCY: ICD-10-CM

## 2023-09-25 DIAGNOSIS — I25.10 CAD IN NATIVE ARTERY: ICD-10-CM

## 2023-09-25 DIAGNOSIS — R53.83 FATIGUE, UNSPECIFIED TYPE: ICD-10-CM

## 2023-09-25 DIAGNOSIS — R51.9 NONINTRACTABLE HEADACHE, UNSPECIFIED CHRONICITY PATTERN, UNSPECIFIED HEADACHE TYPE: ICD-10-CM

## 2023-09-25 DIAGNOSIS — R79.9 ABNORMAL BLOOD CHEMISTRY: ICD-10-CM

## 2023-09-25 LAB
ALBUMIN SERPL BCP-MCNC: 4.1 G/DL (ref 3.5–5)
ALP SERPL-CCNC: 39 U/L (ref 34–104)
ALT SERPL W P-5'-P-CCNC: 16 U/L (ref 7–52)
ANION GAP SERPL CALCULATED.3IONS-SCNC: 8 MMOL/L
AST SERPL W P-5'-P-CCNC: 17 U/L (ref 13–39)
BILIRUB DIRECT SERPL-MCNC: 0.04 MG/DL (ref 0–0.2)
BILIRUB SERPL-MCNC: 0.37 MG/DL (ref 0.2–1)
BUN SERPL-MCNC: 21 MG/DL (ref 5–25)
CALCIUM SERPL-MCNC: 9.9 MG/DL (ref 8.4–10.2)
CHLORIDE SERPL-SCNC: 108 MMOL/L (ref 96–108)
CO2 SERPL-SCNC: 26 MMOL/L (ref 21–32)
CREAT SERPL-MCNC: 0.87 MG/DL (ref 0.6–1.3)
GFR SERPL CREATININE-BSD FRML MDRD: 63 ML/MIN/1.73SQ M
GGT SERPL-CCNC: 9 U/L (ref 9–64)
GLUCOSE P FAST SERPL-MCNC: 100 MG/DL (ref 65–99)
POTASSIUM SERPL-SCNC: 4.7 MMOL/L (ref 3.5–5.3)
PROT SERPL-MCNC: 6.1 G/DL (ref 6.4–8.4)
SODIUM SERPL-SCNC: 142 MMOL/L (ref 135–147)

## 2023-09-25 PROCEDURE — 80076 HEPATIC FUNCTION PANEL: CPT

## 2023-09-25 PROCEDURE — 36415 COLL VENOUS BLD VENIPUNCTURE: CPT

## 2023-09-25 PROCEDURE — 80048 BASIC METABOLIC PNL TOTAL CA: CPT

## 2023-09-25 PROCEDURE — 84681 ASSAY OF C-PEPTIDE: CPT

## 2023-09-25 PROCEDURE — 84305 ASSAY OF SOMATOMEDIN: CPT

## 2023-09-25 PROCEDURE — 82977 ASSAY OF GGT: CPT

## 2023-09-26 LAB — C PEPTIDE SERPL-MCNC: 3.3 NG/ML (ref 1.1–4.4)

## 2023-09-27 LAB — IGF-I SERPL-MCNC: 114 NG/ML (ref 42–185)

## 2023-10-04 ENCOUNTER — HOSPITAL ENCOUNTER (OUTPATIENT)
Dept: RADIOLOGY | Facility: HOSPITAL | Age: 78
Discharge: HOME | End: 2023-10-04
Payer: MEDICARE

## 2023-10-04 DIAGNOSIS — M85.641 CYST OF BONE OF RIGHT HAND: ICD-10-CM

## 2023-10-04 DIAGNOSIS — R09.89 NODULE BEHIND THE HEART: ICD-10-CM

## 2023-10-04 PROCEDURE — 73100 X-RAY EXAM OF WRIST: CPT

## 2023-10-04 PROCEDURE — 73120 X-RAY EXAM OF HAND: CPT

## 2023-12-29 ENCOUNTER — APPOINTMENT (OUTPATIENT)
Dept: LAB | Facility: CLINIC | Age: 78
End: 2023-12-29
Payer: MEDICARE

## 2023-12-29 DIAGNOSIS — R51.9 NONINTRACTABLE HEADACHE, UNSPECIFIED CHRONICITY PATTERN, UNSPECIFIED HEADACHE TYPE: ICD-10-CM

## 2023-12-29 DIAGNOSIS — E03.9 HYPOTHYROIDISM, UNSPECIFIED TYPE: ICD-10-CM

## 2023-12-29 DIAGNOSIS — R73.01 IFG (IMPAIRED FASTING GLUCOSE): ICD-10-CM

## 2023-12-29 DIAGNOSIS — E88.810 METABOLIC SYNDROME: ICD-10-CM

## 2023-12-29 DIAGNOSIS — E78.5 HYPERLIPIDEMIA, UNSPECIFIED HYPERLIPIDEMIA TYPE: ICD-10-CM

## 2023-12-29 LAB
ANION GAP SERPL CALCULATED.3IONS-SCNC: 10 MMOL/L
BUN SERPL-MCNC: 25 MG/DL (ref 5–25)
CALCIUM SERPL-MCNC: 11.1 MG/DL (ref 8.4–10.2)
CHLORIDE SERPL-SCNC: 105 MMOL/L (ref 96–108)
CHOLEST SERPL-MCNC: 240 MG/DL
CO2 SERPL-SCNC: 23 MMOL/L (ref 21–32)
CREAT SERPL-MCNC: 0.83 MG/DL (ref 0.6–1.3)
ERYTHROCYTE [DISTWIDTH] IN BLOOD BY AUTOMATED COUNT: 12.8 % (ref 11.6–15.1)
GFR SERPL CREATININE-BSD FRML MDRD: 66 ML/MIN/1.73SQ M
GLUCOSE P FAST SERPL-MCNC: 147 MG/DL (ref 65–99)
HCT VFR BLD AUTO: 43 % (ref 34.8–46.1)
HDLC SERPL-MCNC: 52 MG/DL
HGB BLD-MCNC: 13.7 G/DL (ref 11.5–15.4)
INSULIN SERPL-ACNC: 17.62 UIU/ML
IRON SATN MFR SERPL: 33 % (ref 15–50)
IRON SERPL-MCNC: 82 UG/DL (ref 50–212)
LDLC SERPL CALC-MCNC: 170 MG/DL (ref 0–100)
MCH RBC QN AUTO: 27.3 PG (ref 26.8–34.3)
MCHC RBC AUTO-ENTMCNC: 31.9 G/DL (ref 31.4–37.4)
MCV RBC AUTO: 86 FL (ref 82–98)
NONHDLC SERPL-MCNC: 188 MG/DL
PLATELET # BLD AUTO: 346 THOUSANDS/UL (ref 149–390)
PMV BLD AUTO: 9.3 FL (ref 8.9–12.7)
POTASSIUM SERPL-SCNC: 4.8 MMOL/L (ref 3.5–5.3)
RBC # BLD AUTO: 5.01 MILLION/UL (ref 3.81–5.12)
SODIUM SERPL-SCNC: 138 MMOL/L (ref 135–147)
TIBC SERPL-MCNC: 250 UG/DL (ref 250–450)
TRIGL SERPL-MCNC: 92 MG/DL
TSH SERPL DL<=0.05 MIU/L-ACNC: 0.71 UIU/ML (ref 0.45–4.5)
UIBC SERPL-MCNC: 168 UG/DL (ref 155–355)
WBC # BLD AUTO: 6.19 THOUSAND/UL (ref 4.31–10.16)

## 2023-12-29 PROCEDURE — 84443 ASSAY THYROID STIM HORMONE: CPT

## 2023-12-29 PROCEDURE — 36415 COLL VENOUS BLD VENIPUNCTURE: CPT

## 2023-12-29 PROCEDURE — 80048 BASIC METABOLIC PNL TOTAL CA: CPT

## 2023-12-29 PROCEDURE — 83550 IRON BINDING TEST: CPT

## 2023-12-29 PROCEDURE — 83525 ASSAY OF INSULIN: CPT

## 2023-12-29 PROCEDURE — 83540 ASSAY OF IRON: CPT

## 2023-12-29 PROCEDURE — 84305 ASSAY OF SOMATOMEDIN: CPT

## 2023-12-29 PROCEDURE — 80061 LIPID PANEL: CPT

## 2023-12-29 PROCEDURE — 85027 COMPLETE CBC AUTOMATED: CPT

## 2024-01-03 LAB — IGF-I SERPL-MCNC: 73 NG/ML (ref 42–185)

## 2024-07-11 ENCOUNTER — APPOINTMENT (OUTPATIENT)
Dept: LAB | Facility: HOSPITAL | Age: 79
End: 2024-07-11
Payer: MEDICARE

## 2024-07-11 DIAGNOSIS — E78.5 HYPERLIPIDEMIA, UNSPECIFIED HYPERLIPIDEMIA TYPE: ICD-10-CM

## 2024-07-11 DIAGNOSIS — J06.9 UPPER RESPIRATORY TRACT INFECTION, UNSPECIFIED TYPE: ICD-10-CM

## 2024-07-11 DIAGNOSIS — R51.9 INTRACTABLE HEADACHE, UNSPECIFIED CHRONICITY PATTERN, UNSPECIFIED HEADACHE TYPE: ICD-10-CM

## 2024-07-11 DIAGNOSIS — R82.90 ABNORMAL URINE: ICD-10-CM

## 2024-07-11 DIAGNOSIS — Z01.84 ANTIBODY RESPONSE EXAMINATION: ICD-10-CM

## 2024-07-11 DIAGNOSIS — R31.9 HEMATURIA, UNSPECIFIED TYPE: ICD-10-CM

## 2024-07-11 DIAGNOSIS — R63.4 LOSS OF WEIGHT: ICD-10-CM

## 2024-07-11 DIAGNOSIS — E88.810 METABOLIC SYNDROME: ICD-10-CM

## 2024-07-11 DIAGNOSIS — E34.9 ENDOCRINE DISORDER: ICD-10-CM

## 2024-07-11 DIAGNOSIS — R53.83 OTHER FATIGUE: ICD-10-CM

## 2024-07-11 DIAGNOSIS — M54.50 LOW BACK PAIN, UNSPECIFIED BACK PAIN LATERALITY, UNSPECIFIED CHRONICITY, UNSPECIFIED WHETHER SCIATICA PRESENT: ICD-10-CM

## 2024-07-11 DIAGNOSIS — Z20.822 EXPOSURE TO COVID-19 VIRUS: ICD-10-CM

## 2024-07-11 DIAGNOSIS — I25.10 CORONARY ARTERY DISEASE, UNSPECIFIED VESSEL OR LESION TYPE, UNSPECIFIED WHETHER ANGINA PRESENT, UNSPECIFIED WHETHER NATIVE OR TRANSPLANTED HEART: ICD-10-CM

## 2024-07-11 DIAGNOSIS — M25.50 ARTHRALGIA, UNSPECIFIED JOINT: ICD-10-CM

## 2024-07-11 DIAGNOSIS — R05.9 COUGH, UNSPECIFIED TYPE: ICD-10-CM

## 2024-07-11 DIAGNOSIS — E03.9 HYPOTHYROIDISM, UNSPECIFIED TYPE: ICD-10-CM

## 2024-07-11 DIAGNOSIS — R73.01 IFG (IMPAIRED FASTING GLUCOSE): ICD-10-CM

## 2024-07-11 DIAGNOSIS — R89.9 ABNORMAL LABORATORY TEST: ICD-10-CM

## 2024-07-11 LAB
ALBUMIN SERPL BCG-MCNC: 4.4 G/DL (ref 3.5–5)
ALP SERPL-CCNC: 42 U/L (ref 34–104)
ALT SERPL W P-5'-P-CCNC: 13 U/L (ref 7–52)
ANION GAP SERPL CALCULATED.3IONS-SCNC: 5 MMOL/L (ref 4–13)
AST SERPL W P-5'-P-CCNC: 13 U/L (ref 13–39)
BILIRUB DIRECT SERPL-MCNC: 0.05 MG/DL (ref 0–0.2)
BILIRUB SERPL-MCNC: 0.45 MG/DL (ref 0.2–1)
BUN SERPL-MCNC: 24 MG/DL (ref 5–25)
CALCIUM SERPL-MCNC: 9.8 MG/DL (ref 8.4–10.2)
CHLORIDE SERPL-SCNC: 108 MMOL/L (ref 96–108)
CO2 SERPL-SCNC: 27 MMOL/L (ref 21–32)
CREAT SERPL-MCNC: 0.93 MG/DL (ref 0.6–1.3)
ERYTHROCYTE [DISTWIDTH] IN BLOOD BY AUTOMATED COUNT: 13.3 % (ref 11.6–15.1)
EST. AVERAGE GLUCOSE BLD GHB EST-MCNC: 117 MG/DL
GFR SERPL CREATININE-BSD FRML MDRD: 58 ML/MIN/1.73SQ M
GLUCOSE P FAST SERPL-MCNC: 101 MG/DL (ref 65–99)
HBA1C MFR BLD: 5.7 %
HCT VFR BLD AUTO: 42.6 % (ref 34.8–46.1)
HGB BLD-MCNC: 13.9 G/DL (ref 11.5–15.4)
MAGNESIUM SERPL-MCNC: 2.1 MG/DL (ref 1.9–2.7)
MCH RBC QN AUTO: 28 PG (ref 26.8–34.3)
MCHC RBC AUTO-ENTMCNC: 32.6 G/DL (ref 31.4–37.4)
MCV RBC AUTO: 86 FL (ref 82–98)
PHOSPHATE SERPL-MCNC: 2.8 MG/DL (ref 2.3–4.1)
PLATELET # BLD AUTO: 321 THOUSANDS/UL (ref 149–390)
PMV BLD AUTO: 8.7 FL (ref 8.9–12.7)
POTASSIUM SERPL-SCNC: 4 MMOL/L (ref 3.5–5.3)
PROT SERPL-MCNC: 6.5 G/DL (ref 6.4–8.4)
RBC # BLD AUTO: 4.97 MILLION/UL (ref 3.81–5.12)
SODIUM SERPL-SCNC: 140 MMOL/L (ref 135–147)
TSH SERPL DL<=0.05 MIU/L-ACNC: 1.44 UIU/ML (ref 0.45–4.5)
WBC # BLD AUTO: 5.16 THOUSAND/UL (ref 4.31–10.16)

## 2024-07-11 PROCEDURE — 84100 ASSAY OF PHOSPHORUS: CPT

## 2024-07-11 PROCEDURE — 83735 ASSAY OF MAGNESIUM: CPT

## 2024-07-11 PROCEDURE — 36415 COLL VENOUS BLD VENIPUNCTURE: CPT

## 2024-07-11 PROCEDURE — 84443 ASSAY THYROID STIM HORMONE: CPT

## 2024-07-11 PROCEDURE — 80048 BASIC METABOLIC PNL TOTAL CA: CPT

## 2024-07-11 PROCEDURE — 85027 COMPLETE CBC AUTOMATED: CPT

## 2024-07-11 PROCEDURE — 83036 HEMOGLOBIN GLYCOSYLATED A1C: CPT

## 2024-07-11 PROCEDURE — 80076 HEPATIC FUNCTION PANEL: CPT

## 2024-07-23 ENCOUNTER — TELEPHONE (OUTPATIENT)
Age: 79
End: 2024-07-23

## 2024-07-23 DIAGNOSIS — I25.10 CORONARY ARTERY DISEASE INVOLVING NATIVE CORONARY ARTERY OF NATIVE HEART WITHOUT ANGINA PECTORIS: ICD-10-CM

## 2024-07-23 DIAGNOSIS — E78.01 FAMILIAL HYPERCHOLESTEREMIA: Primary | ICD-10-CM

## 2024-07-23 NOTE — TELEPHONE ENCOUNTER
Received call from pt asking if Dr. Dorado can put in a lipid panel order for pt to get done prior to appt on 8/30/2024. Please review and advise.     Clinical: Please call pt once ordered. Thanks!

## 2024-08-27 ENCOUNTER — APPOINTMENT (OUTPATIENT)
Dept: LAB | Facility: HOSPITAL | Age: 79
End: 2024-08-27
Payer: MEDICARE

## 2024-08-27 DIAGNOSIS — I25.10 CORONARY ARTERY DISEASE INVOLVING NATIVE CORONARY ARTERY OF NATIVE HEART WITHOUT ANGINA PECTORIS: ICD-10-CM

## 2024-08-27 DIAGNOSIS — E78.01 FAMILIAL HYPERCHOLESTEREMIA: ICD-10-CM

## 2024-08-27 LAB
CHOLEST SERPL-MCNC: 281 MG/DL
HDLC SERPL-MCNC: 50 MG/DL
LDLC SERPL CALC-MCNC: 209 MG/DL (ref 0–100)
NONHDLC SERPL-MCNC: 231 MG/DL
TRIGL SERPL-MCNC: 108 MG/DL

## 2024-08-27 PROCEDURE — 80061 LIPID PANEL: CPT

## 2024-08-27 PROCEDURE — 36415 COLL VENOUS BLD VENIPUNCTURE: CPT

## 2024-08-28 PROBLEM — I21.19 ACUTE ST ELEVATION MYOCARDIAL INFARCTION (STEMI) OF INFERIOR WALL (HCC): Status: RESOLVED | Noted: 2018-01-30 | Resolved: 2024-08-28

## 2024-08-28 PROBLEM — I21.4 NSTEMI (NON-ST ELEVATED MYOCARDIAL INFARCTION) (HCC): Status: RESOLVED | Noted: 2019-01-18 | Resolved: 2024-08-28

## 2024-08-28 NOTE — PROGRESS NOTES
Cardiology Consultation     Sania Giang  [id number]  1945  [facility]ARD[facility] [place]  [address] [place] [id number]      1. Coronary artery disease involving native coronary artery of native heart without angina pectoris  POCT ECG    Echo complete w/ contrast if indicated      2. Familial hypercholesteremia  POCT ECG    LDL cholesterol, direct      3. Borderline hypertension  POCT ECG      4. History of MI (myocardial infarction)        5. Calcification of aorta (HCC)            Discussion/Summary:  Coronary artery disease  - [facility] 1/29/2018 showed 90% mid LAD,  of the LCx and 100% occlusion of the RCA, status post PCI with GLENNA x 1 to the mid LCx, GLENNA x 2 to the RCA  - [facility] 1/30/2018 status post staged PCI to the LAD with GLENNA x 2  - [facility] 1/18/2019 showed 70% mid LAD status post PCI with GLENNA x 1  - Not on aspirin due to risk of bleeding and Christianity, discussed risks of being off aspirin given history of CAD and she wishes continue to stay off of aspirin for now  - Reports having some exertional dyspnea and generalized fatigue, unclear if it is cardiac in nature or from decreased activity and weight gain  - Repeat TTE  Familial hypercholesterolemia  - Patient declines statin therapy  - Lipid profile 8/27/2024 showed total cholesterol 281, triglycerides 108, HDL 50,   - Wants to work on lifestyle modification  - Repeat lipid profile prior to next visit  Borderline hypertension  -Not on antihypertensives currently  - Blood pressure is mildly elevated 142/78 today in the office, improved to 138/76 on repeat  - Will continue to monitor for now  Carotid stenosis  - Carotid ultrasound 1/29/2024 at [facility] showed right proximal ICA 50 to 69% stenosis, left ICA 50 to 69% stenosis  Christianity    Follow-up in 6 months    History of Present Illness:  Sania Giang is a 79 y.o. year old female with a past medical history of coronary artery disease, familial hypercholesterolemia, and  borderline hypertension.    She reports feeling okay today.  She has had some increased fatigue and shortness of breath.  She reports being less active over the last few years because her  has been ill.  She has been under a lot of stress from that.  She is trying to become more active now that her  is doing better.  Denies any episodes of chest pain, palpitations, lower extremity edema, other concerning cardiac symptoms at this time.    Patient Active Problem List   Diagnosis    Borderline hypertension    Familial hypercholesteremia    CAD (coronary artery disease), native coronary artery    Right arm pain    History of MI (myocardial infarction)    Elevated blood pressure reading    Foot pain, left    Age-related osteoporosis without current pathological fracture    Calcification of aorta (HCC)     Past Medical History:   Diagnosis Date    Coronary artery disease     Glaucoma     MI (myocardial infarction) (HCC)      Social History     Socioeconomic History    Marital status: /Civil Union     Spouse name: Not on file    Number of children: Not on file    Years of education: Not on file    Highest education level: Not on file   Occupational History    Not on file   Tobacco Use    Smoking status: Former    Smokeless tobacco: Never   Vaping Use    Vaping status: Never Used   Substance and Sexual Activity    Alcohol use: Yes     Comment: Social    Drug use: No    Sexual activity: Yes     Partners: Male     Birth control/protection: Post-menopausal     Comment:    Other Topics Concern    Not on file   Social History Narrative    Not on file     Social Determinants of Health     Financial Resource Strain: Not on file   Food Insecurity: Not on file   Transportation Needs: Not on file   Physical Activity: Not on file   Stress: Not on file   Social Connections: Not on file   Intimate Partner Violence: Not on file   Housing Stability: Not on file      Family History   Adopted: Yes     Past  Surgical History:   Procedure Laterality Date     SECTION      CORONARY ANGIOPLASTY WITH STENT PLACEMENT      REDUCTION MAMMAPLASTY Bilateral     8 yrs ago       Current Outpatient Medications:     latanoprost (XALATAN) 0.005 % ophthalmic solution, Apply 1 drop to eye Daily, Disp: , Rfl:     Calcium Carbonate-Vit D-Min (CALCIUM 600+D3 PLUS MINERALS) 600-800 MG-UNIT TABS, Take 1 tablet by mouth daily (Patient not taking: Reported on 2022), Disp: , Rfl:     magnesium 30 MG tablet, Take 30 mg by mouth daily   (Patient not taking: Reported on 2024), Disp: , Rfl:     Nattokinase 100 MG CAPS, Take by mouth daily   (Patient not taking: Reported on 2024), Disp: , Rfl:   No Known Allergies      Labs:  Lab Results   Component Value Date    ALT 13 2024    AST 13 2024    BUN 24 2024    CALCIUM 9.8 2024     2024    CHOL 269 2015    CO2 27 2024    CREATININE 0.93 2024    HDL 50 2024    HCT 42.6 2024    HGB 13.9 2024    HGBA1C 5.7 (H) 2024    MG 2.1 2024    PHOS 2.8 2024     2024    K 4.0 2024     2015    TRIG 108 2024    WBC 5.16 2024       Imaging: No results found.    EC2024: Normal sinus rhythm, nonspecific T wave changes in the lateral leads    Review of Systems:  Review of Systems   Constitutional:  Positive for fatigue. Negative for chills, diaphoresis and fever.   HENT:  Negative for congestion.    Eyes:  Negative for photophobia and visual disturbance.   Respiratory:  Positive for shortness of breath. Negative for chest tightness.    Cardiovascular:  Negative for chest pain, palpitations and leg swelling.   Gastrointestinal:  Negative for abdominal distention, abdominal pain, diarrhea, nausea and vomiting.   Genitourinary:  Negative for difficulty urinating and dysuria.   Musculoskeletal:  Negative for arthralgias, gait problem and joint swelling.   Skin:   "Negative for color change, pallor and rash.   Neurological:  Negative for dizziness, syncope, numbness and headaches.   Psychiatric/Behavioral:  Negative for agitation, behavioral problems and confusion.          Vitals:    08/30/24 0927   BP: 138/76   Pulse:       Vitals:    08/30/24 0904   Weight: 70.3 kg (155 lb)     Height: 5' 3\" (160 cm)     Physical Exam:  General appearance:  Appears stated age, alert, well appearing and in no distress  HEENT:  PERRLA, EOMI, no scleral icterus, no conjunctival pallor  NECK:  Supple, No elevated JVP, no thyromegaly, trace left carotid bruit  HEART:  Regular rate and rhythm, normal S1/S2, no S3/S4, no murmur or rub  LUNGS:  Clear to auscultation bilaterally  ABDOMEN:  Soft, non-tender, positive bowel sounds, no rebound or guarding, no organomegaly   EXTREMITIES:  No edema  VASCULAR:  Normal pedal pulses   SKIN: No lesions or rashes on exposed skin  NEURO:  CN II-XII intact, no focal deficits   "

## 2024-08-30 ENCOUNTER — OFFICE VISIT (OUTPATIENT)
Dept: CARDIOLOGY CLINIC | Facility: CLINIC | Age: 79
End: 2024-08-30
Payer: MEDICARE

## 2024-08-30 VITALS
SYSTOLIC BLOOD PRESSURE: 138 MMHG | BODY MASS INDEX: 27.46 KG/M2 | WEIGHT: 155 LBS | HEIGHT: 63 IN | HEART RATE: 72 BPM | DIASTOLIC BLOOD PRESSURE: 76 MMHG

## 2024-08-30 DIAGNOSIS — I70.0 CALCIFICATION OF AORTA (HCC): ICD-10-CM

## 2024-08-30 DIAGNOSIS — R03.0 BORDERLINE HYPERTENSION: ICD-10-CM

## 2024-08-30 DIAGNOSIS — I25.10 CORONARY ARTERY DISEASE INVOLVING NATIVE CORONARY ARTERY OF NATIVE HEART WITHOUT ANGINA PECTORIS: Primary | ICD-10-CM

## 2024-08-30 DIAGNOSIS — E78.01 FAMILIAL HYPERCHOLESTEREMIA: ICD-10-CM

## 2024-08-30 DIAGNOSIS — I25.2 HISTORY OF MI (MYOCARDIAL INFARCTION): Chronic | ICD-10-CM

## 2024-08-30 PROCEDURE — 93000 ELECTROCARDIOGRAM COMPLETE: CPT | Performed by: STUDENT IN AN ORGANIZED HEALTH CARE EDUCATION/TRAINING PROGRAM

## 2024-08-30 PROCEDURE — 99214 OFFICE O/P EST MOD 30 MIN: CPT | Performed by: STUDENT IN AN ORGANIZED HEALTH CARE EDUCATION/TRAINING PROGRAM

## 2024-10-30 ENCOUNTER — HOSPITAL ENCOUNTER (OUTPATIENT)
Dept: NON INVASIVE DIAGNOSTICS | Facility: HOSPITAL | Age: 79
Discharge: HOME | End: 2024-10-30
Attending: STUDENT IN AN ORGANIZED HEALTH CARE EDUCATION/TRAINING PROGRAM
Payer: MEDICARE

## 2024-10-30 VITALS
HEART RATE: 75 BPM | WEIGHT: 155 LBS | HEIGHT: 63 IN | DIASTOLIC BLOOD PRESSURE: 60 MMHG | BODY MASS INDEX: 27.46 KG/M2 | SYSTOLIC BLOOD PRESSURE: 128 MMHG

## 2024-10-30 DIAGNOSIS — I25.10 CORONARY ARTERY DISEASE INVOLVING NATIVE CORONARY ARTERY OF NATIVE HEART WITHOUT ANGINA PECTORIS: ICD-10-CM

## 2024-10-30 LAB
AORTIC ROOT: 2.8 CM
AORTIC VALVE MEAN VELOCITY: 5.1 M/S
APICAL FOUR CHAMBER EJECTION FRACTION: 62 %
ASCENDING AORTA: 3.6 CM
AV AREA BY CONTINUOUS VTI: 3.7 CM2
AV AREA PEAK VELOCITY: 3.2 CM2
AV LVOT MEAN GRADIENT: 2 MMHG
AV LVOT PEAK GRADIENT: 2 MMHG
AV MEAN GRADIENT: 1 MMHG
AV PEAK GRADIENT: 2 MMHG
AV VALVE AREA: 3.74 CM2
AV VELOCITY RATIO: 1.01
BSA FOR ECHO PROCEDURE: 1.74 M2
DOP CALC AO PEAK VEL: 0.72 M/S
DOP CALC AO VTI: 15.53 CM
DOP CALC LVOT AREA: 3.14 CM2
DOP CALC LVOT CARDIAC INDEX: 2.19 L/MIN/M2
DOP CALC LVOT CARDIAC OUTPUT: 3.81 L/MIN
DOP CALC LVOT DIAMETER: 2 CM
DOP CALC LVOT PEAK VEL VTI: 18.49 CM
DOP CALC LVOT PEAK VEL: 0.73 M/S
DOP CALC LVOT STROKE INDEX: 32.2 ML/M2
DOP CALC LVOT STROKE VOLUME: 56
E WAVE DECELERATION TIME: 321 MS
E/A RATIO: 0.56
LAAS-AP2: 15 CM2
LAAS-AP4: 13.8 CM2
LEFT ATRIUM SIZE: 3.7 CM
LEFT ATRIUM VOLUME (MOD BIPLANE): 40 ML
LEFT ATRIUM VOLUME INDEX (MOD BIPLANE): 23 ML/M2
MV E'TISSUE VEL-LAT: 8 CM/S
MV E'TISSUE VEL-SEP: 8 CM/S
MV PEAK A VEL: 0.91 M/S
MV PEAK E VEL: 51 CM/S
MV STENOSIS PRESSURE HALF TIME: 93 MS
MV VALVE AREA P 1/2 METHOD: 2.4
SL CV LEFT ATRIUM LENGTH A2C: 4.8 CM
SL CV LV EF: 60
TRICUSPID ANNULAR PLANE SYSTOLIC EXCURSION: 2 CM

## 2024-10-30 PROCEDURE — C8929 TTE W OR WO FOL WCON,DOPPLER: HCPCS

## 2024-10-30 PROCEDURE — 93306 TTE W/DOPPLER COMPLETE: CPT | Performed by: STUDENT IN AN ORGANIZED HEALTH CARE EDUCATION/TRAINING PROGRAM

## 2024-10-30 RX ADMIN — PERFLUTREN 0.4 ML/MIN: 6.52 INJECTION, SUSPENSION INTRAVENOUS at 12:04

## 2025-02-03 ENCOUNTER — TELEPHONE (OUTPATIENT)
Age: 80
End: 2025-02-03

## 2025-02-03 NOTE — TELEPHONE ENCOUNTER
Pt called in stating her  was very weak had a fall and was seen at ER and diagnosised with [facility] A flu. And she has her new patient visit scheduled next week. But needs some medication prescribed for her respiratory infection and cough. She mentioned Dr. Vázquez would remember her she was her patient past 9 years before. Please do update her by an return call. Thanks

## 2025-02-03 NOTE — TELEPHONE ENCOUNTER
Tried reaching out to patient several times, phone rings and than hangs up, will try again later. Patient will need to schedule an appointment before any medication is ordered.

## 2025-02-13 ENCOUNTER — OFFICE VISIT (OUTPATIENT)
Age: 80
End: 2025-02-13
Payer: MEDICARE

## 2025-02-13 VITALS
DIASTOLIC BLOOD PRESSURE: 64 MMHG | OXYGEN SATURATION: 98 % | HEIGHT: 60 IN | BODY MASS INDEX: 28.86 KG/M2 | HEART RATE: 77 BPM | WEIGHT: 147 LBS | SYSTOLIC BLOOD PRESSURE: 108 MMHG | TEMPERATURE: 98.3 F

## 2025-02-13 DIAGNOSIS — M81.0 AGE-RELATED OSTEOPOROSIS WITHOUT CURRENT PATHOLOGICAL FRACTURE: ICD-10-CM

## 2025-02-13 DIAGNOSIS — E78.2 MIXED HYPERLIPIDEMIA: ICD-10-CM

## 2025-02-13 DIAGNOSIS — Z13.21 ENCOUNTER FOR VITAMIN DEFICIENCY SCREENING: ICD-10-CM

## 2025-02-13 DIAGNOSIS — Z76.89 ENCOUNTER TO ESTABLISH CARE WITH NEW DOCTOR: Primary | ICD-10-CM

## 2025-02-13 DIAGNOSIS — Z13.29 THYROID DISORDER SCREENING: ICD-10-CM

## 2025-02-13 DIAGNOSIS — I25.10 CORONARY ARTERY DISEASE INVOLVING NATIVE CORONARY ARTERY OF NATIVE HEART WITHOUT ANGINA PECTORIS: ICD-10-CM

## 2025-02-13 DIAGNOSIS — Z13.1 SCREENING FOR DIABETES MELLITUS (DM): ICD-10-CM

## 2025-02-13 DIAGNOSIS — Z00.00 MEDICARE ANNUAL WELLNESS VISIT, SUBSEQUENT: ICD-10-CM

## 2025-02-13 PROCEDURE — 99204 OFFICE O/P NEW MOD 45 MIN: CPT | Performed by: INTERNAL MEDICINE

## 2025-02-13 PROCEDURE — G0438 PPPS, INITIAL VISIT: HCPCS | Performed by: INTERNAL MEDICINE

## 2025-02-13 NOTE — PROGRESS NOTES
Patient is here to establish care.  She has past medical history of hyperlipidemia coronary disease status post MI status post angioplasty under care of [facility] cardiology.  Her last LDL in September was quite concerning at 209.  She also has carotid artery disease 50 to 69% stenosis in left 50 to 69% stenosis.  She has mild hyperglycemia with last hemoglobin A1c 5.7 improved from 6.1 the time before.  Fasting blood sugar was 107.  Kidney functions good  Health preventative:  No recent mammogram under care of [facility]  DEXA 2022 scan shows osteoporosis left femoral neck not on any medication

## 2025-02-13 NOTE — PROGRESS NOTES
Name: Sania Giang      : 1945      MRN: [id number]  Encounter Provider: Yokasta Vázquez MD  Encounter Date: 2025   Encounter department: [facility] PRIMARY CARE    Assessment & Plan  Encounter to establish care with new doctor  Available records in the chart reviewed       Coronary artery disease involving native coronary artery of native heart without angina pectoris  Patient asymptomatic at this time however unfortunately does not want statins.  Orders:    CBC and differential    Lipid panel    Age-related osteoporosis without current pathological fracture  Patient currently not on medication we will do a DEXA scan.  Orders:    Vitamin D 25 hydroxy    DXA bone density spine hip and pelvis; Future    Screening for diabetes mellitus (DM)    Orders:    Comprehensive metabolic panel    Hemoglobin A1C; Future    Urinalysis with microscopic; Future    Thyroid disorder screening    Orders:    TSH, 3rd generation with Free T4 reflex    Encounter for vitamin deficiency screening    Orders:    Vitamin D 25 hydroxy    Medicare annual wellness visit, subsequent  Patient declines vaccination.  She declines statin use.  She will do mammogram in the future however does not want to do it this year.  She is interested in DEXA scan which will be ordered today.  Last DEXA scan shows osteoporosis.         Depression Screening and Follow-up Plan: Patient was screened for depression during today's encounter. They screened negative with a PHQ-2 score of 0.        Preventive health issues were discussed with patient, and age appropriate screening tests were ordered as noted in patient's After Visit Summary. Personalized health advice and appropriate referrals for health education or preventive services given if needed, as noted in patient's After Visit Summary.    History of Present Illness     HPI   Patient is here to establish care.  She has past medical history of hyperlipidemia coronary disease status post MI  status post angioplasty under care of [facility] cardiology.  Her last LDL in September was quite concerning at 209.  However, patient declines statin use and does understand she can have another cardiovascular and several vascular accident could be fatal.   She also has carotid artery disease 50 to 69% stenosis in left 50 to 69% stenosis.  She has mild hyperglycemia with last hemoglobin A1c 5.7 improved from 6.1 the time before.  Fasting blood sugar was 107.  Kidney functions good.  Health preventative:  No recent mammogram under care of [facility] Gomez patient does not want go for mammogram this year.  DEXA 2022 scan shows osteoporosis left femoral neck not on any medication.  Will order another DEXA scan      Patient Care Team:  Srinivasa Fox DO as PCP - General  Geeta Nation MD    Review of Systems  Medical History Reviewed by provider this encounter:       Annual Wellness Visit Questionnaire   Sania is here for her Subsequent Wellness visit.     Health Risk Assessment:   Patient rates overall health as good. Patient feels that their physical health rating is slightly worse. Patient is satisfied with their life. Eyesight was rated as same. Hearing was rated as slightly worse. Patient feels that their emotional and mental health rating is same. Patients states they are never, rarely angry. Patient states they are often unusually tired/fatigued. Pain experienced in the last 7 days has been none. Patient states that she has experienced no weight loss or gain in last 6 months.     Depression Screening:   PHQ-2 Score: 0      Fall Risk Screening:   In the past year, patient has experienced: no history of falling in past year      Urinary Incontinence Screening:   Patient has not leaked urine accidently in the last six months.     Home Safety:  Patient does not have trouble with stairs inside or outside of their home. Patient has working smoke alarms and has working carbon monoxide detector. Home safety hazards  include: none.     Nutrition:   Current diet is Regular.     Medications:   Patient is currently taking over-the-counter supplements. OTC medications include: see medication list. Patient is able to manage medications.     Activities of Daily Living (ADLs)/Instrumental Activities of Daily Living (IADLs):   Walk and transfer into and out of bed and chair?: Yes  Dress and groom yourself?: Yes    Bathe or shower yourself?: Yes    Feed yourself? Yes  Do your laundry/housekeeping?: Yes  Manage your money, pay your bills and track your expenses?: Yes  Make your own meals?: Yes    Do your own shopping?: Yes    Previous Hospitalizations:   Any hospitalizations or ED visits within the last 12 months?: No      Advance Care Planning:   Living will: Yes    Durable POA for healthcare: Yes    Advanced directive: Yes    ACP document given: Yes      Cognitive Screening:   Provider or family/friend/caregiver concerned regarding cognition?: No    PREVENTIVE SCREENINGS      Cardiovascular Screening:    General: Screening Not Indicated and History Lipid Disorder    Due for: Lipid Panel      Diabetes Screening:       Due for: Blood Glucose      Colorectal Cancer Screening:     General: Screening Current      Breast Cancer Screening:       Due for: Mammogram        Cervical Cancer Screening:    General: Screening Not Indicated      Osteoporosis Screening:    General: Screening Not Indicated and History Osteoporosis    Due for: DXA Axial      Abdominal Aortic Aneurysm (AAA) Screening:        General: Screening Not Indicated      Lung Cancer Screening:     General: Screening Not Indicated    Screening, Brief Intervention, and Referral to Treatment (SBIRT)     Screening      Single Item Drug Screening:  How often have you used an illegal drug (including marijuana) or a prescription medication for non-medical reasons in the past year? never    Single Item Drug Screen Score: 0  Interpretation: Negative screen for possible drug use  disorder       No results found.    Objective   Temp 98.3 °F (36.8 °C) (Temporal)   Ht 5' (1.524 m)   Wt 66.7 kg (147 lb)   LMP  (LMP Unknown)   BMI 28.71 kg/m²     Physical Exam  Cardiovascular:      Rate and Rhythm: Normal rate and regular rhythm.      Heart sounds: Normal heart sounds. No murmur heard.  Pulmonary:      Effort: Pulmonary effort is normal. No respiratory distress.      Breath sounds: Normal breath sounds. No wheezing or rales.   Abdominal:      General: Bowel sounds are normal. There is no distension.      Tenderness: There is no abdominal tenderness. There is no guarding.   Musculoskeletal:      Right lower leg: No edema.      Left lower leg: No edema.   Neurological:      General: No focal deficit present.      Mental Status: She is oriented to person, place, and time.      Motor: No weakness.   Psychiatric:         Mood and Affect: Mood normal.         Behavior: Behavior normal.

## 2025-02-13 NOTE — ASSESSMENT & PLAN NOTE
Patient currently not on medication we will do a DEXA scan.  Orders:    Vitamin D 25 hydroxy    DXA bone density spine hip and pelvis; Future

## 2025-02-13 NOTE — ASSESSMENT & PLAN NOTE
Patient asymptomatic at this time however unfortunately does not want statins.  Orders:    CBC and differential    Lipid panel

## 2025-02-13 NOTE — PATIENT INSTRUCTIONS
Medicare Preventive Visit Patient Instructions  Thank you for completing your Welcome to Medicare Visit or Medicare Annual Wellness Visit today. Your next wellness visit will be due in one year (2/14/2026).  The screening/preventive services that you may require over the next 5-10 years are detailed below. Some tests may not apply to you based off risk factors and/or age. Screening tests ordered at today's visit but not completed yet may show as past due. Also, please note that scanned in results may not display below.  Preventive Screenings:  Service Recommendations Previous Testing/Comments   Colorectal Cancer Screening  * Colonoscopy    * Fecal Occult Blood Test (FOBT)/Fecal Immunochemical Test (FIT)  * Fecal DNA/Cologuard Test  * Flexible Sigmoidoscopy Age: 45-75 years old   Colonoscopy: every 10 years (may be performed more frequently if at higher risk)  OR  FOBT/FIT: every 1 year  OR  Cologuard: every 3 years  OR  Sigmoidoscopy: every 5 years  Screening may be recommended earlier than age 45 if at higher risk for colorectal cancer. Also, an individualized decision between you and your healthcare provider will decide whether screening between the ages of 76-85 would be appropriate. Colonoscopy: Not on file  FOBT/FIT: Not on file  Cologuard: Not on file  Sigmoidoscopy: Not on file          Breast Cancer Screening Age: 40+ years old  Frequency: every 1-2 years  Not required if history of left and right mastectomy Mammogram: 02/22/2022        Cervical Cancer Screening Between the ages of 21-29, pap smear recommended once every 3 years.   Between the ages of 30-65, can perform pap smear with HPV co-testing every 5 years.   Recommendations may differ for women with a history of total hysterectomy, cervical cancer, or abnormal pap smears in past. Pap Smear: 01/27/2022        Hepatitis C Screening Once for adults born between 1945 and 1965  More frequently in patients at high risk for Hepatitis C Hep C Antibody: Not  on file        Diabetes Screening 1-2 times per year if you're at risk for diabetes or have pre-diabetes Fasting glucose: 101 mg/dL (7/11/2024)  A1C: 5.7 % (7/11/2024)      Cholesterol Screening Once every 5 years if you don't have a lipid disorder. May order more often based on risk factors. Lipid panel: 08/27/2024          Other Preventive Screenings Covered by Medicare:  Abdominal Aortic Aneurysm (AAA) Screening: covered once if your at risk. You're considered to be at risk if you have a family history of AAA.  Lung Cancer Screening: covers low dose CT scan once per year if you meet all of the following conditions: (1) Age 55-77; (2) No signs or symptoms of lung cancer; (3) Current smoker or have quit smoking within the last 15 years; (4) You have a tobacco smoking history of at least 20 pack years (packs per day multiplied by number of years you smoked); (5) You get a written order from a healthcare provider.  Glaucoma Screening: covered annually if you're considered high risk: (1) You have diabetes OR (2) Family history of glaucoma OR (3)  aged 50 and older OR (4)  American aged 65 and older  Osteoporosis Screening: covered every 2 years if you meet one of the following conditions: (1) You're estrogen deficient and at risk for osteoporosis based off medical history and other findings; (2) Have a vertebral abnormality; (3) On glucocorticoid therapy for more than 3 months; (4) Have primary hyperparathyroidism; (5) On osteoporosis medications and need to assess response to drug therapy.   Last bone density test (DXA Scan): 02/25/2022.  HIV Screening: covered annually if you're between the age of 15-65. Also covered annually if you are younger than 15 and older than 65 with risk factors for HIV infection. For pregnant patients, it is covered up to 3 times per pregnancy.    Immunizations:  Immunization Recommendations   Influenza Vaccine Annual influenza vaccination during flu season is  recommended for all persons aged >= 6 months who do not have contraindications   Pneumococcal Vaccine   * Pneumococcal conjugate vaccine = PCV13 (Prevnar 13), PCV15 (Vaxneuvance), PCV20 (Prevnar 20)  * Pneumococcal polysaccharide vaccine = PPSV23 (Pneumovax) Adults 19-63 yo with certain risk factors or if 65+ yo  If never received any pneumonia vaccine: recommend Prevnar 20 (PCV20)  Give PCV20 if previously received 1 dose of PCV13 or PPSV23   Hepatitis B Vaccine 3 dose series if at intermediate or high risk (ex: diabetes, end stage renal disease, liver disease)   Respiratory syncytial virus (RSV) Vaccine - COVERED BY MEDICARE PART D  * RSVPreF3 (Arexvy) CDC recommends that adults 60 years of age and older may receive a single dose of RSV vaccine using shared clinical decision-making (SCDM)   Tetanus (Td) Vaccine - COST NOT COVERED BY MEDICARE PART B Following completion of primary series, a booster dose should be given every 10 years to maintain immunity against tetanus. Td may also be given as tetanus wound prophylaxis.   Tdap Vaccine - COST NOT COVERED BY MEDICARE PART B Recommended at least once for all adults. For pregnant patients, recommended with each pregnancy.   Shingles Vaccine (Shingrix) - COST NOT COVERED BY MEDICARE PART B  2 shot series recommended in those 19 years and older who have or will have weakened immune systems or those 50 years and older     Health Maintenance Due:  There are no preventive care reminders to display for this patient.  Immunizations Due:      Topic Date Due   • Pneumococcal Vaccine: 65+ Years (1 of 2 - PCV) Never done   • Influenza Vaccine (1) Never done   • COVID-19 Vaccine (3 - 2024-25 season) 09/01/2024     Advance Directives   What are advance directives?  Advance directives are legal documents that state your wishes and plans for medical care. These plans are made ahead of time in case you lose your ability to make decisions for yourself. Advance directives can apply to  any medical decision, such as the treatments you want, and if you want to donate organs.   What are the types of advance directives?  There are many types of advance directives, and each state has rules about how to use them. You may choose a combination of any of the following:  Living will:  This is a written record of the treatment you want. You can also choose which treatments you do not want, which to limit, and which to stop at a certain time. This includes surgery, medicine, IV fluid, and tube feedings.   Durable power of  for healthcare (DPAHC):  This is a written record that states who you want to make healthcare choices for you when you are unable to make them for yourself. This person, called a proxy, is usually a family member or a friend. You may choose more than 1 proxy.  Do not resuscitate (DNR) order:  A DNR order is used in case your heart stops beating or you stop breathing. It is a request not to have certain forms of treatment, such as CPR. A DNR order may be included in other types of advance directives.  Medical directive:  This covers the care that you want if you are in a coma, near death, or unable to make decisions for yourself. You can list the treatments you want for each condition. Treatment may include pain medicine, surgery, blood transfusions, dialysis, IV or tube feedings, and a ventilator (breathing machine).  Values history:  This document has questions about your views, beliefs, and how you feel and think about life. This information can help others choose the care that you would choose.  Why are advance directives important?  An advance directive helps you control your care. Although spoken wishes may be used, it is better to have your wishes written down. Spoken wishes can be misunderstood, or not followed. Treatments may be given even if you do not want them. An advance directive may make it easier for your family to make difficult choices about your care.   Weight  Management   Why it is important to manage your weight:  Being overweight increases your risk of health conditions such as heart disease, high blood pressure, type 2 diabetes, and certain types of cancer. It can also increase your risk for osteoarthritis, sleep apnea, and other respiratory problems. Aim for a slow, steady weight loss. Even a small amount of weight loss can lower your risk of health problems.  How to lose weight safely:  A safe and healthy way to lose weight is to eat fewer calories and get regular exercise. You can lose up about 1 pound a week by decreasing the number of calories you eat by 500 calories each day.   Healthy meal plan for weight management:  A healthy meal plan includes a variety of foods, contains fewer calories, and helps you stay healthy. A healthy meal plan includes the following:  Eat whole-grain foods more often.  A healthy meal plan should contain fiber. Fiber is the part of grains, fruits, and vegetables that is not broken down by your body. Whole-grain foods are healthy and provide extra fiber in your diet. Some examples of whole-grain foods are whole-wheat breads and pastas, oatmeal, brown rice, and bulgur.  Eat a variety of vegetables every day.  Include dark, leafy greens such as spinach, kale, luh greens, and mustard greens. Eat yellow and orange vegetables such as carrots, sweet potatoes, and winter squash.   Eat a variety of fruits every day.  Choose fresh or canned fruit (canned in its own juice or light syrup) instead of juice. Fruit juice has very little or no fiber.  Eat low-fat dairy foods.  Drink fat-free (skim) milk or 1% milk. Eat fat-free yogurt and low-fat cottage cheese. Try low-fat cheeses such as mozzarella and other reduced-fat cheeses.  Choose meat and other protein foods that are low in fat.  Choose beans or other legumes such as split peas or lentils. Choose fish, skinless poultry (chicken or turkey), or lean cuts of red meat (beef or pork). Before  you cook meat or poultry, cut off any visible fat.   Use less fat and oil.  Try baking foods instead of frying them. Add less fat, such as margarine, sour cream, regular salad dressing and mayonnaise to foods. Eat fewer high-fat foods. Some examples of high-fat foods include french fries, doughnuts, ice cream, and cakes.  Eat fewer sweets.  Limit foods and drinks that are high in sugar. This includes candy, cookies, regular soda, and sweetened drinks.  Exercise:  Exercise at least 30 minutes per day on most days of the week. Some examples of exercise include walking, biking, dancing, and swimming. You can also fit in more physical activity by taking the stairs instead of the elevator or parking farther away from stores. Ask your healthcare provider about the best exercise plan for you.      © Copyright [employer] 2018 Information is for End User's use only and may not be sold, redistributed or otherwise used for commercial purposes. All illustrations and images included in CareNotes® are the copyrighted property of A.D.A.M., Inc. or [employer]

## 2025-05-12 ENCOUNTER — RESULTS FOLLOW-UP (OUTPATIENT)
Age: 80
End: 2025-05-12

## 2025-05-12 ENCOUNTER — APPOINTMENT (OUTPATIENT)
Dept: LAB | Facility: HOSPITAL | Age: 80
End: 2025-05-12
Payer: MEDICARE

## 2025-05-12 DIAGNOSIS — E78.01 FAMILIAL HYPERCHOLESTEREMIA: ICD-10-CM

## 2025-05-12 DIAGNOSIS — Z13.1 SCREENING FOR DIABETES MELLITUS (DM): ICD-10-CM

## 2025-05-12 LAB
25(OH)D3 SERPL-MCNC: 101.7 NG/ML (ref 30–100)
ALBUMIN SERPL BCG-MCNC: 4.1 G/DL (ref 3.5–5)
ALP SERPL-CCNC: 37 U/L (ref 34–104)
ALT SERPL W P-5'-P-CCNC: 12 U/L (ref 7–52)
ANION GAP SERPL CALCULATED.3IONS-SCNC: 4 MMOL/L (ref 4–13)
AST SERPL W P-5'-P-CCNC: 12 U/L (ref 13–39)
BACTERIA UR QL AUTO: ABNORMAL /HPF
BASOPHILS # BLD AUTO: 0.03 THOUSANDS/ÂΜL (ref 0–0.1)
BASOPHILS NFR BLD AUTO: 1 % (ref 0–1)
BILIRUB SERPL-MCNC: 0.38 MG/DL (ref 0.2–1)
BILIRUB UR QL STRIP: NEGATIVE
BUN SERPL-MCNC: 18 MG/DL (ref 5–25)
CALCIUM SERPL-MCNC: 9.7 MG/DL (ref 8.4–10.2)
CAOX CRY URNS QL MICRO: ABNORMAL /HPF
CHLORIDE SERPL-SCNC: 109 MMOL/L (ref 96–108)
CHOLEST SERPL-MCNC: 251 MG/DL (ref ?–200)
CLARITY UR: CLEAR
CO2 SERPL-SCNC: 27 MMOL/L (ref 21–32)
COLOR UR: ABNORMAL
CREAT SERPL-MCNC: 0.91 MG/DL (ref 0.6–1.3)
EOSINOPHIL # BLD AUTO: 0.07 THOUSAND/ÂΜL (ref 0–0.61)
EOSINOPHIL NFR BLD AUTO: 2 % (ref 0–6)
ERYTHROCYTE [DISTWIDTH] IN BLOOD BY AUTOMATED COUNT: 13.6 % (ref 11.6–15.1)
EST. AVERAGE GLUCOSE BLD GHB EST-MCNC: 117 MG/DL
GFR SERPL CREATININE-BSD FRML MDRD: 59 ML/MIN/1.73SQ M
GLUCOSE P FAST SERPL-MCNC: 101 MG/DL (ref 65–99)
GLUCOSE UR STRIP-MCNC: NEGATIVE MG/DL
HBA1C MFR BLD: 5.7 %
HCT VFR BLD AUTO: 41.7 % (ref 34.8–46.1)
HDLC SERPL-MCNC: 43 MG/DL
HGB BLD-MCNC: 13.6 G/DL (ref 11.5–15.4)
HGB UR QL STRIP.AUTO: NEGATIVE
HYALINE CASTS #/AREA URNS LPF: ABNORMAL /LPF
IMM GRANULOCYTES # BLD AUTO: 0.01 THOUSAND/UL (ref 0–0.2)
IMM GRANULOCYTES NFR BLD AUTO: 0 % (ref 0–2)
KETONES UR STRIP-MCNC: NEGATIVE MG/DL
LDLC SERPL CALC-MCNC: 182 MG/DL (ref 0–100)
LDLC SERPL DIRECT ASSAY-MCNC: 197 MG/DL (ref 0–100)
LEUKOCYTE ESTERASE UR QL STRIP: ABNORMAL
LYMPHOCYTES # BLD AUTO: 1.92 THOUSANDS/ÂΜL (ref 0.6–4.47)
LYMPHOCYTES NFR BLD AUTO: 43 % (ref 14–44)
MCH RBC QN AUTO: 28 PG (ref 26.8–34.3)
MCHC RBC AUTO-ENTMCNC: 32.6 G/DL (ref 31.4–37.4)
MCV RBC AUTO: 86 FL (ref 82–98)
MONOCYTES # BLD AUTO: 0.26 THOUSAND/ÂΜL (ref 0.17–1.22)
MONOCYTES NFR BLD AUTO: 6 % (ref 4–12)
MUCOUS THREADS UR QL AUTO: ABNORMAL
NEUTROPHILS # BLD AUTO: 2.18 THOUSANDS/ÂΜL (ref 1.85–7.62)
NEUTS SEG NFR BLD AUTO: 48 % (ref 43–75)
NITRITE UR QL STRIP: NEGATIVE
NON-SQ EPI CELLS URNS QL MICRO: ABNORMAL /HPF
NONHDLC SERPL-MCNC: 208 MG/DL
NRBC BLD AUTO-RTO: 0 /100 WBCS
PH UR STRIP.AUTO: 5.5 [PH]
PLATELET # BLD AUTO: 333 THOUSANDS/UL (ref 149–390)
PMV BLD AUTO: 8.7 FL (ref 8.9–12.7)
POTASSIUM SERPL-SCNC: 4.3 MMOL/L (ref 3.5–5.3)
PROT SERPL-MCNC: 6.5 G/DL (ref 6.4–8.4)
PROT UR STRIP-MCNC: NEGATIVE MG/DL
RBC # BLD AUTO: 4.85 MILLION/UL (ref 3.81–5.12)
RBC #/AREA URNS AUTO: ABNORMAL /HPF
SODIUM SERPL-SCNC: 140 MMOL/L (ref 135–147)
SP GR UR STRIP.AUTO: 1.02 (ref 1–1.03)
TRIGL SERPL-MCNC: 131 MG/DL (ref ?–150)
TSH SERPL DL<=0.05 MIU/L-ACNC: 1.98 UIU/ML (ref 0.45–4.5)
UROBILINOGEN UR STRIP-ACNC: <2 MG/DL
WBC # BLD AUTO: 4.47 THOUSAND/UL (ref 4.31–10.16)
WBC #/AREA URNS AUTO: ABNORMAL /HPF

## 2025-05-12 PROCEDURE — 83036 HEMOGLOBIN GLYCOSYLATED A1C: CPT

## 2025-05-12 PROCEDURE — 81001 URINALYSIS AUTO W/SCOPE: CPT

## 2025-05-12 PROCEDURE — 83721 ASSAY OF BLOOD LIPOPROTEIN: CPT

## 2025-05-12 NOTE — RESULT ENCOUNTER NOTE
Vitamin D is quite high.  Please start vitamin D we discussed rest of the normal labs next appointment

## 2025-05-13 NOTE — RESULT ENCOUNTER NOTE
Spoke with pt,  informed her of the findings. Pt had concerns and stated she will discuss them with Dr. Vázquez on her follow-up appt.

## 2025-05-15 ENCOUNTER — OFFICE VISIT (OUTPATIENT)
Age: 80
End: 2025-05-15
Payer: MEDICARE

## 2025-05-15 ENCOUNTER — TELEPHONE (OUTPATIENT)
Age: 80
End: 2025-05-15

## 2025-05-15 VITALS
RESPIRATION RATE: 16 BRPM | BODY MASS INDEX: 26.04 KG/M2 | OXYGEN SATURATION: 100 % | SYSTOLIC BLOOD PRESSURE: 112 MMHG | TEMPERATURE: 97.6 F | HEART RATE: 66 BPM | DIASTOLIC BLOOD PRESSURE: 76 MMHG | HEIGHT: 63 IN

## 2025-05-15 DIAGNOSIS — E78.49 OTHER HYPERLIPIDEMIA: Primary | ICD-10-CM

## 2025-05-15 DIAGNOSIS — C43.72 MALIGNANT MELANOMA OF LEFT LOWER EXTREMITY INCLUDING HIP (HCC): ICD-10-CM

## 2025-05-15 DIAGNOSIS — H35.3221 EXUDATIVE AGE-RELATED MACULAR DEGENERATION, LEFT EYE, WITH ACTIVE CHOROIDAL NEOVASCULARIZATION (HCC): ICD-10-CM

## 2025-05-15 DIAGNOSIS — R73.09 ELEVATED HEMOGLOBIN A1C: ICD-10-CM

## 2025-05-15 DIAGNOSIS — F41.9 ANXIETY DUE TO INVASIVE PROCEDURE: ICD-10-CM

## 2025-05-15 DIAGNOSIS — R31.21 ASYMPTOMATIC MICROSCOPIC HEMATURIA: ICD-10-CM

## 2025-05-15 DIAGNOSIS — Z12.31 ENCOUNTER FOR SCREENING MAMMOGRAM FOR BREAST CANCER: ICD-10-CM

## 2025-05-15 PROCEDURE — G2211 COMPLEX E/M VISIT ADD ON: HCPCS | Performed by: INTERNAL MEDICINE

## 2025-05-15 PROCEDURE — 99214 OFFICE O/P EST MOD 30 MIN: CPT | Performed by: INTERNAL MEDICINE

## 2025-05-15 RX ORDER — ALPRAZOLAM 0.25 MG
TABLET ORAL
Qty: 1 TABLET | Refills: 0 | Status: SHIPPED | OUTPATIENT
Start: 2025-05-15

## 2025-05-15 NOTE — TELEPHONE ENCOUNTER
Patient called in to check if medication called did check on chart and relayed the same she is going to call the pharmacy. Thanks

## 2025-05-15 NOTE — PROGRESS NOTES
Name: Sania Giang      : 1945      MRN: [id number]  Encounter Provider: Yokasta Vázquez MD  Encounter Date: 5/15/2025   Encounter department: [facility] PRIMARY CARE  :  Assessment & Plan  Other hyperlipidemia  LDL quite high.  Patient is asymptomatic.  Patient does not want statin.  Patient encouraged to discuss with cardiology.  Strongly encouraged her to reconsider statin.    Asymptomatic microscopic hematuria  Patient is asymptomatic.  Kidney and bladder ultrasound will be ordered  Orders:    US kidney and bladder; Future    Elevated hemoglobin A1c  Dietary modification       Encounter for screening mammogram for breast cancer  Patient is due for DEXA scan as well.  She will try to get in before next visit along with mammogram  Orders:    Mammo screening bilateral w cad; Future    Malignant melanoma of left lower extremity including hip (HCC)  Patient getting procedure tomorrow and is quite nervous.  Lorazepam will be called in.       Anxiety due to invasive procedure    Orders:    ALPRAZolam (XANAX) 0.25 mg tablet; Take 1 pill 30 minutes before procedure    Exudative age-related macular degeneration, left eye, with active choroidal neovascularization (HCC)                History of Present Illness   HPI  Patient is here to follow-up on recent lab studies.  LDL is quite elevated.  It did improve to 193 from 200s.  Patient has declined statin.  She is coronary disease and has stents.  She is asymptomatic at this time.  She has an appointment coming up with cardiology in July..    Hemoglobin A1c is elevated at 5.7.  Glucose level is slightly high at 101.  We discussed diet modification.  Will repeat blood work in 3 months.  Vitamin D was quite dangerously elevated at 101.  Patient will stop taking supplement.  Thankfully calcium is okay.    Lastly, patient is noted to have hematuria.  Calcium oxalate stones were also noted.  Suspect kidney stones.  Kidney and bladder ultrasound will be ordered.  "     Review of Systems    Objective   /76 (BP Location: Right arm, Patient Position: Sitting, Cuff Size: Large)   Pulse 66   Temp 97.6 °F (36.4 °C) (Temporal)   Resp 16   Ht 5' 3\" (1.6 m)   LMP  (LMP Unknown)   SpO2 100%   BMI 26.04 kg/m²      Physical Exam    Cardiovascular:      Rate and Rhythm: Normal rate and regular rhythm.      Heart sounds: Normal heart sounds. No murmur heard.  Pulmonary:      Effort: No respiratory distress.      Breath sounds: Normal breath sounds. No wheezing or rales.   Abdominal:      General: There is no distension.      Tenderness: There is no abdominal tenderness. There is no guarding.     Musculoskeletal:      Right lower leg: No edema.      Left lower leg: No edema.     Skin:     Findings: Lesion (Well-healing left lower extremity biopsy site) present.     Neurological:      Mental Status: She is alert.     Psychiatric:         Mood and Affect: Mood normal.         Behavior: Behavior normal.         "

## 2025-05-15 NOTE — TELEPHONE ENCOUNTER
Patient calls back regarding her visit this AM. Patient is asking for an anti-anxiety medication for an excision of a skin lesion tomorrow, Patient states that she discussed this at her visit this AM with PCP but no medication has been prescribed. This is for a 1x dose only for the excision on 5/16/25. Patient is asking if the medication order can be sent to   [id number] - [place], [place] - [address]  [address] [address]  Phone: [phone]  Fax: [phone]  RAD #: --   Please call the patient at [phone] when this has been completed.

## 2025-07-08 NOTE — PROGRESS NOTES
Cardiology Consultation     Sania Giang  [id number]  1945  [facility] CARDIOLOG[facility] [place]  [address] [facility] [address]      1. Coronary artery disease involving native coronary artery of native heart without angina pectoris        2. Familial hypercholesteremia        3. Borderline hypertension        4. History of MI (myocardial infarction)        5. Calcification of aorta (HCC)              Discussion/Summary:  Coronary artery disease  - [facility] 1/29/2018 showed 90% mid LAD,  of the LCx and 100% occlusion of the RCA, status post PCI with GLENNA x 1 to the mid LCx, GLENNA x 2 to the RCA  - [facility] 1/30/2018 status post staged PCI to the LAD with GLENNA x 2  - [facility] 1/18/2019 showed 70% mid LAD status post PCI with GLENNA x 1  -TTE 10/30/2024 showed EF 60%, grade 1 DD, mild MR  - Not on aspirin due to risk of bleeding and Oriental orthodox, discussed risks of being off aspirin given history of CAD and she wishes continue to stay off of aspirin for now  Familial hypercholesterolemia  - Patient declines statin therapy, she is concerned is going to worsen her prediabetes  - Lipid profile 5/12/2025 showed total cholesterol 251, triglycerides 131, HDL 43, LDL direct 197  - Wants to work on lifestyle modification  - Discussed starting Zetia, patient reports she wants to do her own research on the medication before making a decision, will discuss again at next visit  Borderline hypertension  -Not on antihypertensives currently  - Blood pressure is well-controlled today  Carotid stenosis  - Carotid ultrasound 1/29/2024 at [facility] showed right proximal ICA 50 to 69% stenosis, left ICA 50 to 69% stenosis  Oriental orthodox  Prediabetic    Follow-up in 6 months    History of Present Illness:  Sania Giang is a 79 y.o. year old female with a past medical history of coronary artery disease, familial hypercholesterolemia, and borderline hypertension.    8/30/2024: She reports feeling okay today.  She has had some increased  fatigue and shortness of breath.  She reports being less active over the last few years because her  has been ill.  She has been under a lot of stress from that.  She is trying to become more active now that her  is doing better.  Denies any episodes of chest pain, palpitations, lower extremity edema, other concerning cardiac symptoms at this time.    Interval history:  She is accompanied by her  today.  Overall she reports feeling okay and has no complaints.  Reports walking less more recently and also gained some weight over the winter.  She is working on trying to exercise more and lose weight.  Denies any exertional chest pain, palpitations, shortness of breath, lower extremity edema, or other concerning cardiac symptoms at this time.    Patient Active Problem List   Diagnosis    Borderline hypertension    Familial hypercholesteremia    CAD (coronary artery disease), native coronary artery    Right arm pain    History of MI (myocardial infarction)    Elevated blood pressure reading    Foot pain, left    Age-related osteoporosis without current pathological fracture    Calcification of aorta (HCC)    Exudative age-related macular degeneration, left eye, with active choroidal neovascularization (HCC)     Past Medical History:   Diagnosis Date    Coronary artery disease     Glaucoma     Melanoma (HCC)     Left LEg    MI (myocardial infarction) (HCC)      Social History     Socioeconomic History    Marital status: /Civil Union     Spouse name: Not on file    Number of children: Not on file    Years of education: Not on file    Highest education level: Not on file   Occupational History    Not on file   Tobacco Use    Smoking status: Former    Smokeless tobacco: Never   Vaping Use    Vaping status: Never Used   Substance and Sexual Activity    Alcohol use: Yes     Comment: Social    Drug use: No    Sexual activity: Yes     Partners: Male     Birth control/protection: Post-menopausal      Comment:    Other Topics Concern    Not on file   Social History Narrative    Not on file     Social Drivers of Health     Financial Resource Strain: Not on file   Food Insecurity: No Food Insecurity (2025)    Nursing - Inadequate Food Risk Classification     Worried About Running Out of Food in the Last Year: Never true     Ran Out of Food in the Last Year: Never true     Ran Out of Food in the Last Year: Not on file   Transportation Needs: No Transportation Needs (2025)    PRAPARE - Transportation     Lack of Transportation (Medical): No     Lack of Transportation (Non-Medical): No   Physical Activity: Not on file   Stress: Not on file   Social Connections: Not on file   Intimate Partner Violence: Not on file   Housing Stability: Low Risk  (2025)    Housing Stability Vital Sign     Unable to Pay for Housing in the Last Year: No     Number of Times Moved in the Last Year: 1     Homeless in the Last Year: No      Family History   Adopted: Yes     Past Surgical History:   Procedure Laterality Date     SECTION      CORONARY ANGIOPLASTY WITH STENT PLACEMENT      REDUCTION MAMMAPLASTY Bilateral     8 yrs ago    TONSILLECTOMY         Current Outpatient Medications:     latanoprost (XALATAN) 0.005 % ophthalmic solution, Apply 1 drop to eye in the morning., Disp: , Rfl:     ALPRAZolam (XANAX) 0.25 mg tablet, Take 1 pill 30 minutes before procedure (Patient not taking: Reported on 2025), Disp: 1 tablet, Rfl: 0    Calcium Carbonate-Vit D-Min (CALCIUM 600+D3 PLUS MINERALS) 600-800 MG-UNIT TABS, Take 1 tablet by mouth daily (Patient not taking: Reported on 2022), Disp: , Rfl:     magnesium 30 MG tablet, Take 30 mg by mouth daily   (Patient not taking: Reported on 2024), Disp: , Rfl:     Nattokinase 100 MG CAPS, Take by mouth daily (Patient not taking: Reported on 2025), Disp: , Rfl:   No Known Allergies      Labs:  Lab Results   Component Value Date    ALT 12 2025    AST  "12 (L) 2025    BUN 18 2025    CALCIUM 9.7 2025     (H) 2025    CHOL 269 2015    CO2 27 2025    CREATININE 0.91 2025    HDL 43 (L) 2025    HCT 41.7 2025    HGB 13.6 2025    HGBA1C 5.7 (H) 2025    MG 2.1 2024    PHOS 2.8 2024     2025    K 4.3 2025     2015    TRIG 131 2025    WBC 4.47 2025       Imaging: No results found.    EC2024: Normal sinus rhythm, nonspecific T wave changes in the lateral leads    Review of Systems:  Review of Systems   Constitutional:  Negative for chills, diaphoresis, fatigue and fever.   HENT:  Negative for congestion.    Eyes:  Negative for photophobia and visual disturbance.   Respiratory:  Negative for chest tightness and shortness of breath.    Cardiovascular:  Negative for chest pain, palpitations and leg swelling.   Gastrointestinal:  Negative for abdominal distention, abdominal pain, diarrhea, nausea and vomiting.   Genitourinary:  Negative for difficulty urinating and dysuria.   Musculoskeletal:  Negative for arthralgias, gait problem and joint swelling.   Skin:  Negative for color change, pallor and rash.   Neurological:  Negative for dizziness, syncope, numbness and headaches.   Psychiatric/Behavioral:  Negative for agitation, behavioral problems and confusion.          Vitals:    25   BP: 122/82   Pulse: 77   SpO2: 98%        Vitals:    25   Weight: 68.5 kg (151 lb)       Height: 5' 3\" (160 cm)     Physical Exam:  General appearance:  Appears stated age, alert, well appearing and in no distress  HEENT:  PERRLA, EOMI, no scleral icterus, no conjunctival pallor  NECK:  Supple, No elevated JVP, no thyromegaly, trace left carotid bruit  HEART:  Regular rate and rhythm, normal S1/S2, no S3/S4, 2/6 systolic murmur  LUNGS:  Clear to auscultation bilaterally  ABDOMEN:  Soft, non-tender, positive bowel sounds, no rebound or guarding, " no organomegaly   EXTREMITIES:  No edema  VASCULAR:  Normal pedal pulses   SKIN: No lesions or rashes on exposed skin  NEURO:  CN II-XII intact, no focal deficits

## 2025-07-09 ENCOUNTER — OFFICE VISIT (OUTPATIENT)
Dept: CARDIOLOGY CLINIC | Facility: CLINIC | Age: 80
End: 2025-07-09
Payer: MEDICARE

## 2025-07-09 VITALS
WEIGHT: 151 LBS | SYSTOLIC BLOOD PRESSURE: 122 MMHG | OXYGEN SATURATION: 98 % | HEART RATE: 77 BPM | HEIGHT: 63 IN | DIASTOLIC BLOOD PRESSURE: 82 MMHG | BODY MASS INDEX: 26.75 KG/M2

## 2025-07-09 DIAGNOSIS — I70.0 CALCIFICATION OF AORTA (HCC): ICD-10-CM

## 2025-07-09 DIAGNOSIS — R03.0 BORDERLINE HYPERTENSION: ICD-10-CM

## 2025-07-09 DIAGNOSIS — E78.01 FAMILIAL HYPERCHOLESTEREMIA: ICD-10-CM

## 2025-07-09 DIAGNOSIS — I25.10 CORONARY ARTERY DISEASE INVOLVING NATIVE CORONARY ARTERY OF NATIVE HEART WITHOUT ANGINA PECTORIS: Primary | ICD-10-CM

## 2025-07-09 DIAGNOSIS — I25.2 HISTORY OF MI (MYOCARDIAL INFARCTION): ICD-10-CM

## 2025-07-09 PROCEDURE — G2211 COMPLEX E/M VISIT ADD ON: HCPCS | Performed by: STUDENT IN AN ORGANIZED HEALTH CARE EDUCATION/TRAINING PROGRAM

## 2025-07-09 PROCEDURE — 99214 OFFICE O/P EST MOD 30 MIN: CPT | Performed by: STUDENT IN AN ORGANIZED HEALTH CARE EDUCATION/TRAINING PROGRAM

## 2025-08-14 ENCOUNTER — OFFICE VISIT (OUTPATIENT)
Age: 80
End: 2025-08-14
Payer: MEDICARE